# Patient Record
Sex: FEMALE | Race: WHITE | NOT HISPANIC OR LATINO | Employment: OTHER | URBAN - METROPOLITAN AREA
[De-identification: names, ages, dates, MRNs, and addresses within clinical notes are randomized per-mention and may not be internally consistent; named-entity substitution may affect disease eponyms.]

---

## 2017-06-26 ENCOUNTER — HOSPITAL ENCOUNTER (OUTPATIENT)
Dept: PHYSICAL THERAPY | Facility: CLINIC | Age: 72
Setting detail: THERAPIES SERIES
End: 2017-06-26
Attending: PSYCHIATRY & NEUROLOGY
Payer: MEDICARE

## 2017-06-26 PROCEDURE — 40000189 ZZH STATISTIC PT POOL VISIT: Performed by: PHYSICAL THERAPIST

## 2017-06-26 PROCEDURE — 97110 THERAPEUTIC EXERCISES: CPT | Mod: GP | Performed by: PHYSICAL THERAPIST

## 2017-06-26 PROCEDURE — 97161 PT EVAL LOW COMPLEX 20 MIN: CPT | Mod: GP | Performed by: PHYSICAL THERAPIST

## 2017-06-26 PROCEDURE — G8979 MOBILITY GOAL STATUS: HCPCS | Mod: GP,CI | Performed by: PHYSICAL THERAPIST

## 2017-06-26 PROCEDURE — G8978 MOBILITY CURRENT STATUS: HCPCS | Mod: GP,CJ | Performed by: PHYSICAL THERAPIST

## 2017-06-26 NOTE — PROGRESS NOTES
06/26/17 0800   Quick Adds   Type of Visit Initial OP PT Evaluation   General Information   Start of Care Date 06/26/17   Referring Physician Giselle Epstein MD  ([facility])   Orders Evaluate and Treat as Indicated   Order Date 05/01/17   Medical Diagnosis Parkinsons Disease   Special Instructions (pool therapy)   Surgical/Medical history reviewed (no outside notes available in chart to review)   Pertinent history of current problem (include personal factors and/or comorbidities that impact the POC) Pt presents to PT to address symptoms associated with Parkinsons Disease and chronic mid-back pain and LBP.  She states she started Sinemet for management of PD in December 2016, which has provided improvement in daily function and balance.  She reports longstanding issues with arthritis causing mid-back pain, neck pain.  She reports her neurologist sent her here to for pool therapy to address balance training, strengthening, and postural retraining.  She reports progressive forward flexed posture over the past 6 months.  She reports first diagnosis of Parkinsons Disease in the Fall of 2016.  Pt indicates hx of C3-5 fusion due to stenosis on 2/3/17 and also has hx of R shoulder tendon repair/rotator cuff repair 11/2015.  She reports she has worked with OP PT for balance training in 01/2017 which helped, but she reports her balance has worsened since then.  Hx of falls due to balance issues, has fallen ~3 times since last November.  Had fall last November that caused increased buttock pain but no fracture.  Onset of back pain with prolonged standing/walking >10 min, typically worse in afternoons.  No radicular pain reported.   Prior level of function comment Indep PLOF   Previous/Current Treatment Physical Therapy;Medication(s)   Improvement after PT Significant  (improved balance with OP PT 01/2017)   Improvement after medication Significant  (Sinemet improving functional mobility)   Patient role/Employment  history Retired   Living environment House/townhome   Home/Community Accessibility Comments single level townhome, no stairs   Current Assistive Devices (occasionally using 4WW, not typically)   Assistive Devices Comments pt owns a FWW and 4WW, but does not typically use it.  Has typically only used it after her falls due to increased back and hip soreness.   Patient/Family Goals Statement Pt hopes to improve her posture, stand straight   General Information Comments pt does not report much interest working with land-based PT despite hx of balance issues   Fall Risk Screen   Fall screen completed by PT   Per patient - Fall 2 or more times in past year? Yes   Per patient - Fall with injury in past year? Yes  (contusions, no fractures)   Timed Up and Go score (seconds) 9.31   Is patient a fall risk? No  (although hx of falls)   Fall screen comments pt reports ~3 falls since November, reports she is tripping over her feet.  She reports her falls cause pain into her hips and back, uses a walker after her falls typically.     Pain   Patient currently in pain Yes   Pain location (mid and low-back)   Pain rating 1   Pain description Sharp;Dull   Pain comments pt reports increased mid- and low-back pain throughout the day and when she stands/walks >10 min, states she is limited to ~1 block distances due to pain and fatigue.  She reports pain 5/10 at times with activity and prolonged standing.   Cognitive Status Examination   Orientation orientation to person, place and time   Integumentary   Integumentary No deficits were identified   Posture   Posture Forward head position;Protracted shoulders;Kyphosis;Other  (scoliosis)   Posture Comments Mild/moderate forward head positioning and kyphotic upper quarter posture.  Pt reports hx of scoliosis.   Palpation   Palpation Mild TTP about mid-lumbar segments, minimal/no TTP throughout paraspinal mm and buttocks, but palpable mm tone noted in back.   Range of Motion (ROM)   ROM  Comment Limited trunk and spinal extension as well as rotation and sidebending.  Seated R cervical rotation 45 deg, L rotation 30 deg.  Tightness about R hip compared to L, lacking hip extension on R past neutral in sidelying.   Strength   Strength Comments Generalized weakness/deconditioning proximally at hips.  Functional weakness related to kyphotic posture and forward flexed upper quarter.  5/5 jack ankle PF/DF and knee extension.  Jack hip flexion 4-/5, R hip abduction 3-/5, L hip abduction 3+/5.   Bed Mobility   Bed Mobility Comments Indep, limited trunk rotation and mobility   Transfer Skills   Transfer Comments Indep, steady on feet, forward flexed posture.  Able to perform without hands.   Gait   Gait Comments Indep, steady gait without AD.  Limited trunk rotation and forward flexed posture assumed.  Mild limitation in jack foot clearance with gait cycle.  Good ability to change speeds and navigate around obstacles.   Gait Special Tests   Gait Special Tests 25 FOOT TIMED WALK;DYNAMIC GAIT INDEX   Gait Special Tests 25 Foot Timed Walk   Seconds 6.3   Comments no AD   Gait Special Tests Dynamic Gait Index   Score out of 24 20   Balance   Balance Comments Good functional gait stability, but impaired SLS and funtional tasks such as stepping up/over box or curb.   Balance Special Tests   Balance Special Tests Single leg stance right;Single leg stance left;Timed up and go;Modified CTSIB Conditions   Balance Special Tests Timed Up and Go   Comments 9.31 sec, no AD   Balance Special Tests Single Leg Stance Right,   Comments 1   Balance Special Tests Single Leg Stance Left   Comments 1   Balance Special Tests Modified CTSIB Conditions   Condition 1, seconds 30 Seconds   Condition 2, seconds 30 Seconds   Condition 4, seconds 30 Seconds   Condition 5, seconds 30 Seconds   Sensory Examination   Sensory Perception Comments mild numbness in toes, specifically R great toe   Coordination   Coordination Comments no observable  tremors noted related to PD   Planned Therapy Interventions   Planned Therapy Interventions balance training;gait training;neuromuscular re-education;joint mobilization;ROM;strengthening;stretching;transfer training;manual therapy;other (see comments)  (pool therapy)   Planned Therapy Interventions Comment consider transition to land-based PT interventions as appropriate   Clinical Impression   Criteria for Skilled Therapeutic Interventions Met yes, treatment indicated   PT Diagnosis Back pain and impaired posture, impaired balance   Influenced by the following impairments Back pain, forward flexed posture, limited spinal ROM, impaired dynamic balance, decreased hip ROM, weakness in chong LEs and trunk/core   Functional limitations due to impairments Impaired dynamic gait stability, difficulty with functional tasks requiring SLS, limited standing/walking tolerance to ~1 block distances due to back pain and fatigue   Clinical Presentation Stable/Uncomplicated   Clinical Presentation Rationale stable medical status, following with neuro team regularly, functional status currently   Clinical Decision Making (Complexity) Low complexity   Therapy Frequency 2 times/Week   Predicted Duration of Therapy Intervention (days/wks) 4  (anticipated prolonged POC due to scheduling)   Risk & Benefits of therapy have been explained Yes   Patient, Family & other staff in agreement with plan of care Yes   Education Assessment   Barriers to Learning No barriers   Goal 1   Goal Identifier SLS   Goal Description Pablo will demonstrate ability to perform SLS > 5 sec chong for improved stability with lower body dressing and stepping into tubs, up/down curbs, and navigating stairs.   Target Date 08/11/17   Goal 2   Goal Identifier Pain   Goal Description Pablo will report 0-1/10 pain about her back x 1 week for improved tolerance with daily activities and improved QOL.   Target Date 08/11/17   Goal 3   Goal Identifier Activity   Goal  Description Pablo will report/demonstrate ability to tolerate standing exercise and/or walking program for >20 min consecutively with pain no greater than 1/10 about her back for improved tolerance with community ambulation distances and return to active lifestyle.   Target Date 08/11/17   Goal 4   Goal Identifier HEP   Goal Description Pablo will demonstrate I with HEP for long-term management of back pain and postural retraining for improved QOL and tolerance with daily activities.   Target Date 08/11/17   Total Evaluation Time   Total Evaluation Time (Minutes) 38   Therapy Certification   Certification date from 06/26/17   Certification date to 08/11/17   Medical Diagnosis Parkinsons Disease

## 2017-06-26 NOTE — PROGRESS NOTES
"                                                                                              [facility]        OUTPATIENT PHYSICAL THERAPY FUNCTIONAL EVALUATION  PLAN OF TREATMENT FOR OUTPATIENT REHABILITATION  (COMPLETE FOR INITIAL CLAIMS ONLY)  Patient's Last Name, First Name, M.I.  YOB: 1945  Pablo Baez  AS     Provider's Name   [facility]   Medical Record No.  [id number]     Start of Care Date:  06/26/17   Onset Date:   10/1/2016 (pt unable to state when pain started, \"Fall of 2016\")   Type:     _X__PT   ____OT  ____SLP Medical Diagnosis:  Parkinsons Disease     PT Diagnosis:  Back pain and impaired posture, impaired balance Visits from SOC:  1                              __________________________________________________________________________________  Plan of Treatment/Functional Goals:  balance training, gait training, neuromuscular re-education, joint mobilization, ROM, strengthening, stretching, transfer training, manual therapy, other (see comments) (pool therapy)  consider transition to land-based PT interventions as appropriate        GOALS  SLS  Pablo will demonstrate ability to perform SLS > 5 sec chong for improved stability with lower body dressing and stepping into tubs, up/down curbs, and navigating stairs.  08/11/17    Pain  Pablo will report 0-1/10 pain about her back x 1 week for improved tolerance with daily activities and improved QOL.  08/11/17    Activity  Pablo will report/demonstrate ability to tolerate standing exercise and/or walking program for >20 min consecutively with pain no greater than 1/10 about her back for improved tolerance with community ambulation distances and return to active lifestyle.  08/11/17    HEP  Pablo will demonstrate I with HEP for long-term management of back pain and postural retraining for improved QOL and tolerance with daily activities.  08/11/17       Therapy Frequency:  2 times/Week "   Predicted Duration of Therapy Intervention:  4 wks (anticipated prolonged POC due to scheduling)    Jason Miller PT                                    I CERTIFY THE NEED FOR THESE SERVICES FURNISHED UNDER        THIS PLAN OF TREATMENT AND WHILE UNDER MY CARE .             Physician Signature               Date    X_____________________________________________________                      Certification Date From:  06/26/17   Certification Date To:  08/11/17    Referring Provider:  Giselle Epstein MD ([facility])    Initial Assessment  See Epic Evaluation- Start of Care Date: 06/26/17

## 2017-06-29 ENCOUNTER — HOSPITAL ENCOUNTER (OUTPATIENT)
Dept: PHYSICAL THERAPY | Facility: CLINIC | Age: 72
Setting detail: THERAPIES SERIES
End: 2017-06-29
Attending: PSYCHIATRY & NEUROLOGY
Payer: MEDICARE

## 2017-06-29 PROCEDURE — 97113 AQUATIC THERAPY/EXERCISES: CPT | Mod: GP | Performed by: PHYSICAL THERAPIST

## 2017-06-29 PROCEDURE — 40000189 ZZH STATISTIC PT POOL VISIT: Performed by: PHYSICAL THERAPIST

## 2017-06-29 NOTE — PROGRESS NOTES
AQUATIC PHYSICAL THERAPY EXERCISE LOG (TRUNK)  Date  Senait Chino, DPT     Warm Up Ambulation (Forward/Side/Back/March/All) X 2 With therapist ARLIN CHAMORRO             Stretching/ROM Chin Tucks     CROM (Flex/Ext/SB/Rot/All)     Shoulder (Shrugs/Rolls)     Scapular (Retraction/Depression)     Upper Trunk (Levator/Scalene/Upper Trapezius)     Pec Stretch (Unilateral/Bilateral)     Posterior Shoulder     Gluteal 30 sec x 2    Hamstring (On Step/Cuff) 30 sec x2    Calf (In Hole/At Wall)     Quad     Hip Flexor (Kneel)     Piriformis (Seated/Stand)     Trunk ROM (Flex/Ext/SB/Rot/All) Extension stretch in standing manual cues 10 sec x 2    BAD RAGAZ              Strengthening Abdominal Sets/ Pelvic Tilt With exercises    Shoulder (Flex/Ext, Abd/Add, IR/ER, Circles, Alternation flex/ext for core) With open paddles away from wall x 10, cues for decreased speed as needed for improved stability    Horizon (Abd/Add, Diagonals) X 10 as above    Rowing Arms     UE PNF (D1/D2)     Abdominal Sets (Push/Pull, side to side, push down with board) X 10 with small board    Squat     Lunge Squat     Hip (Flex/Ext, Abd/Add, single leg bike, circles, figure 8, All) X 10 with wall support    Heel/Toe Raises     Step Ups (Forward, Lateral)     Noodle Push Downs with UE(B UE/1 UE) for core strengthening X 10 in wall sit positioning, colored noodle, verbal and manual cues for shoulder alignment    Noodle Push Downs with LE X 10 in corner,needing B UE support and therapist assist with noodle    Stir the Pot/Punches with Dumbells     Sit to Stand at Bench     Prone Plank on step     Side Plank on step              Aerobic Fast Walking     Bike/Ski/Jack/All              Balance Narrow Base of Support     Tandem Stand     Single Leg Stance     Heel/Toe Walking     3 Step and Stop     Braiding                   Cool Down Ambulation     [place] Dangle     [place]

## 2017-07-05 NOTE — ADDENDUM NOTE
Encounter addended by: Jason Miller, PT on: 7/5/2017 10:38 AM<BR>     Actions taken: Flowsheet accepted, Sign clinical note

## 2017-07-06 ENCOUNTER — HOSPITAL ENCOUNTER (OUTPATIENT)
Dept: PHYSICAL THERAPY | Facility: CLINIC | Age: 72
Setting detail: THERAPIES SERIES
End: 2017-07-06
Attending: PSYCHIATRY & NEUROLOGY
Payer: MEDICARE

## 2017-07-06 PROCEDURE — 40000189 ZZH STATISTIC PT POOL VISIT: Performed by: PHYSICAL THERAPIST

## 2017-07-06 PROCEDURE — 97113 AQUATIC THERAPY/EXERCISES: CPT | Mod: GP | Performed by: PHYSICAL THERAPIST

## 2017-07-06 NOTE — PROGRESS NOTES
AQUATIC PHYSICAL THERAPY EXERCISE LOG (TRUNK)  Date   Senait Herrmannlynn, DPT Kaylin Valdez, PT   7/6/17   Warm Up Ambulation (Forward/Side/Back/March/All) X 2 With therapist ARLIN B x2 with SBA, cues for arm swing and increased step length                     Stretching/ROM Chin Tucks        CROM (Flex/Ext/SB/Rot/All)        Shoulder (Shrugs/Rolls)        Scapular (Retraction/Depression)        Upper Trunk (Levator/Scalene/Upper Trapezius)        Pec Stretch (Unilateral/Bilateral)        Posterior Shoulder        Gluteal 30 sec x 2 30 sec x2 B     Hamstring (On Step/Cuff) 30 sec x2 30 sec x2 B     Calf (In Hole/At Wall)        Quad   x1 min B with yellow noodle     Hip Flexor (Kneel)        Piriformis (Seated/Stand)        Trunk ROM (Flex/Ext/SB/Rot/All) Extension stretch in standing manual cues 10 sec x 2 Ext stretch in standing manual cues x15 sec x3     BAD RAGAZ                        Strengthening Abdominal Sets/ Pelvic Tilt With exercises Cued throughout with ex     Shoulder (Flex/Ext, Abd/Add, IR/ER, Circles, Alternation flex/ext for core) With open paddles away from wall x 10, cues for decreased speed as needed for improved stability With open paddles away from wall x10; cues for techniuqe     Horizon (Abd/Add, Diagonals) X 10 as above x10 as above     Rowing Arms        UE PNF (D1/D2)        Abdominal Sets (Push/Pull, side to side, push down with board) X 10 with small board      Squat        Lunge Squat        Hip (Flex/Ext, Abd/Add, single leg bike, circles, figure 8, All) X 10 with wall support x10 with wall support, cues needed throughout for trunk extension     Heel/Toe Raises        Step Ups (Forward, Lateral)        Noodle Push Downs with UE(B UE/1 UE) for core strengthening X 10 in wall sit positioning, colored noodle, verbal and manual cues for shoulder alignment      Noodle Push Downs with LE X 10 in corner,needing B UE support and therapist assist with noodle x10 with yellow noodle; wall support needed  and therapist assist for noodle placement     Stir the Pot/Punches with Dumbells        Sit to Stand at Bench        Prone Plank on step        Side Plank on step                        Aerobic Fast Walking        Bike/Ski/Stone/All                        Balance Narrow Base of Support        Tandem Stand        Single Leg Stance        Heel/Toe Walking        3 Step and Stop        Braiding                                 Cool Down Ambulation   x2 forward with emphasis on posture and step length     [place] Dangle        [place]

## 2017-07-12 ENCOUNTER — HOSPITAL ENCOUNTER (OUTPATIENT)
Dept: PHYSICAL THERAPY | Facility: CLINIC | Age: 72
Setting detail: THERAPIES SERIES
End: 2017-07-12
Attending: PSYCHIATRY & NEUROLOGY
Payer: MEDICARE

## 2017-07-12 PROCEDURE — 97113 AQUATIC THERAPY/EXERCISES: CPT | Mod: GP | Performed by: PHYSICAL THERAPIST

## 2017-07-12 PROCEDURE — 97112 NEUROMUSCULAR REEDUCATION: CPT | Mod: GP | Performed by: PHYSICAL THERAPIST

## 2017-07-12 PROCEDURE — 40000189 ZZH STATISTIC PT POOL VISIT: Performed by: PHYSICAL THERAPIST

## 2017-07-20 ENCOUNTER — HOSPITAL ENCOUNTER (OUTPATIENT)
Dept: PHYSICAL THERAPY | Facility: CLINIC | Age: 72
Setting detail: THERAPIES SERIES
End: 2017-07-20
Attending: PSYCHIATRY & NEUROLOGY
Payer: MEDICARE

## 2017-07-20 PROCEDURE — 40000189 ZZH STATISTIC PT POOL VISIT: Performed by: PHYSICAL THERAPIST

## 2017-07-20 PROCEDURE — 97113 AQUATIC THERAPY/EXERCISES: CPT | Mod: GP | Performed by: PHYSICAL THERAPIST

## 2017-07-20 NOTE — PROGRESS NOTES
AQUATIC PHYSICAL THERAPY EXERCISE LOG (TRUNK)  Date    Senait Herrmannlynn, DPT Kaylin Valdez, PT   7/6/17 Senait Magana, 7/20/17   Warm Up Ambulation (Forward/Side/Back/March/All) X 2 With therapist HHA B x2 with SBA, cues for arm swing and increased step length X 2 With manual cues for posture                               Stretching/ROM Chin Tucks            CROM (Flex/Ext/SB/Rot/All)            Shoulder (Shrugs/Rolls)            Scapular (Retraction/Depression)            Upper Trunk (Levator/Scalene/Upper Trapezius)            Pec Stretch (Unilateral/Bilateral)            Posterior Shoulder            Gluteal 30 sec x 2 30 sec x2 B 30 sec x 2      Hamstring (On Step/Cuff) 30 sec x2 30 sec x2 B 30 sec x 2      Calf (In Hole/At Wall)            Quad    x1 min B with yellow noodle      Hip Flexor (Kneel)            Piriformis (Seated/Stand)            Trunk ROM (Flex/Ext/SB/Rot/All) Extension stretch in standing manual cues 10 sec x 2 Ext stretch in standing manual cues x15 sec x3      BAD RAGAZ                                     Strengthening Abdominal Sets/ Pelvic Tilt With exercises Cued throughout with ex Throughout exercises     Shoulder (Flex/Ext, Abd/Add, IR/ER, Circles, Alternation flex/ext for core) With open paddles away from wall x 10, cues for decreased speed as needed for improved stability With open paddles away from wall x10; cues for techniuqe      Horizon (Abd/Add, Diagonals) X 10 as above x10 as above       Rowing Arms            UE PNF (D1/D2)            Abdominal Sets (Push/Pull, side to side, push down with board) X 10 with small board         Squat      X 10 with glute taps      Lunge Squat            Hip (Flex/Ext, Abd/Add, single leg bike, circles, figure 8, All) X 10 with wall support x10 with wall support, cues needed throughout for trunk extension X 10 with wall support     Heel/Toe Raises            Step Ups (Forward, Lateral)            Noodle Push Downs with UE(B UE/1 UE) for core  strengthening X 10 in wall sit positioning, colored noodle, verbal and manual cues for shoulder alignment         Noodle Push Downs with LE X 10 in corner,needing B UE support and therapist assist with noodle x10 with yellow noodle; wall support needed and therapist assist for noodle placement X 10 with therapist support     Stir the Pot/Punches with Dumbells            Sit to Stand at Bench      X 10 with min A for 5, 5 with SBA        Prone Plank on step            Side Plank on step                                     Aerobic Fast Walking            Bike/Ski/Stone/All                                     Balance Narrow Base of Support            Tandem Stand            Single Leg Stance            Heel/Toe Walking            3 Step and Stop      X 2 forwards, x 2 backwards, needing min A for balance intermittantly      Braiding                    Yoga poses and transitions holds for 5-10 secs, then transitions between pose: mountain pose with x 3 lumbar extensions, chair pose, warrior 1, warrior 2. All with hands on assist initially for pose then holds. Pt cued for transitions, needing manual assist              Marching x 2 laps with cues for big movement, cues for increased B arm swing              reciprical arm movement with big steps   Cool Down Ambulation    x2 forward with emphasis on posture and step length      [place] Dangle            [place]

## 2017-07-26 ENCOUNTER — HOSPITAL ENCOUNTER (OUTPATIENT)
Dept: PHYSICAL THERAPY | Facility: CLINIC | Age: 72
Setting detail: THERAPIES SERIES
End: 2017-07-26
Attending: PSYCHIATRY & NEUROLOGY
Payer: MEDICARE

## 2017-07-26 PROCEDURE — 40000189 ZZH STATISTIC PT POOL VISIT: Performed by: PHYSICAL THERAPIST

## 2017-07-26 PROCEDURE — 97113 AQUATIC THERAPY/EXERCISES: CPT | Mod: GP | Performed by: PHYSICAL THERAPIST

## 2017-07-26 PROCEDURE — 97112 NEUROMUSCULAR REEDUCATION: CPT | Mod: GP | Performed by: PHYSICAL THERAPIST

## 2017-07-26 NOTE — PROGRESS NOTES
AQUATIC PHYSICAL THERAPY EXERCISE LOG (TRUNK)  Date    Senait Magana, DPT Kaylin Valdez, PT   7/6/17 Senait Magana, 7/20/17 Christine Pack PT, DPT  7/26/17   Warm Up Ambulation (Forward/Side/Back/March/All) X 2 With therapist ARLIN B x2 with SBA, cues for arm swing and increased step length X 2 With manual cues for posture X 2 With manual cues for posture and inc step length, inc arm swing                                     Stretching/ROM Chin Tucks               CROM (Flex/Ext/SB/Rot/All)               Shoulder (Shrugs/Rolls)               Scapular (Retraction/Depression)               Upper Trunk (Levator/Scalene/Upper Trapezius)               Pec Stretch (Unilateral/Bilateral)               Posterior Shoulder               Gluteal 30 sec x 2 30 sec x2 B 30 sec x 2       Hamstring (On Step/Cuff) 30 sec x2 30 sec x2 B 30 sec x 2 30 sec x 2 noodle support under ankle      Calf (In Hole/At Wall)         x30 sec ea with manual overpressure to quad      Quad    x1 min B with yellow noodle   x1 min B with yellow noodle      Hip Flexor (Kneel)               Piriformis (Seated/Stand)               Trunk ROM (Flex/Ext/SB/Rot/All) Extension stretch in standing manual cues 10 sec x 2 Ext stretch in standing manual cues x15 sec x3   Ext stretch in standing manual cues x15 sec x3      BAD RAGAZ                                              Strengthening Abdominal Sets/ Pelvic Tilt With exercises Cued throughout with ex Throughout exercises Throughout exercises     Shoulder (Flex/Ext, Abd/Add, IR/ER, Circles, Alternation flex/ext for core) With open paddles away from wall x 10, cues for decreased speed as needed for improved stability With open paddles away from wall x10; cues for techniuqe         Horizon (Abd/Add, Diagonals) X 10 as above x10 as above         Rowing Arms         Demo on land for carryover of HEP      UE PNF (D1/D2)               Abdominal Sets (Push/Pull, side to side, push down with board) X 10 with small board             Squat       X 10 with glute taps x10 glut taps to wall: cues for B UE forward reach and trunk lean for anterior wt shift > quick transition to stand with UEs extended to sides       Lunge Squat         Large forward step ea LE with UE extended to sides x10 ea      Hip (Flex/Ext, Abd/Add, single leg bike, circles, figure 8, All) X 10 with wall support x10 with wall support, cues needed throughout for trunk extension X 10 with wall support       Heel/Toe Raises               Step Ups (Forward, Lateral)               Noodle Push Downs with UE(B UE/1 UE) for core strengthening X 10 in wall sit positioning, colored noodle, verbal and manual cues for shoulder alignment            Noodle Push Downs with LE X 10 in corner,needing B UE support and therapist assist with noodle x10 with yellow noodle; wall support needed and therapist assist for noodle placement X 10 with therapist support      Stir the Pot/Punches with Dumbells               Sit to Stand at Bench       X 10 with min A for 5, 5 with SBA       Prone Plank on step               Side Plank on step                          Posture exercise: x10 trunk support against pool wall, heels against wall B forward reach > overhead > extended to sides shoulder height x10 sec holds                    Aerobic Fast Walking               Bike/Ski/Stone/All                                              Balance Narrow Base of Support               Tandem Stand               Single Leg Stance               Heel/Toe Walking               3 Step and Stop       X 2 forwards, x 2 backwards, needing min A for balance intermittantly       Braiding                        Yoga poses and transitions holds for 5-10 secs, then transitions between pose: mountain pose with x 3 lumbar extensions, chair pose, warrior 1, warrior 2. All with hands on assist initially for pose then holds. Pt cued for transitions, needing manual assist reinforcing high amplitude/power movements side stepping  with LE ER, trunk rotation, same sided B UE extended to sides  warrior pose  x5 sec holds               Marching x 2 laps with cues for big movement, cues for increased B arm swing                reciprical arm movement with big steps    Cool Down Ambulation    x2 forward with emphasis on posture and step length         [place] Dangle               [place]

## 2017-08-07 ENCOUNTER — HOSPITAL ENCOUNTER (OUTPATIENT)
Dept: PHYSICAL THERAPY | Facility: CLINIC | Age: 72
Setting detail: THERAPIES SERIES
End: 2017-08-07
Attending: PSYCHIATRY & NEUROLOGY
Payer: MEDICARE

## 2017-08-07 PROCEDURE — 40000189 ZZH STATISTIC PT POOL VISIT: Performed by: PHYSICAL THERAPIST

## 2017-08-07 PROCEDURE — 97113 AQUATIC THERAPY/EXERCISES: CPT | Mod: GP | Performed by: PHYSICAL THERAPIST

## 2017-08-07 NOTE — PROGRESS NOTES
AQUATIC PHYSICAL THERAPY EXERCISE LOG (TRUNK)  Date    Senait Magana, DPT Kaylin Valdez, PT   7/6/17 Senait Montezlynn, 7/20/17 Christine Pack PT, DPT  7/26/17 Senait Herrmannlynn, DPT 8/7/17   Warm Up Ambulation (Forward/Side/Back/March/All) X 2 With therapist ARLIN B x2 with SBA, cues for arm swing and increased step length X 2 With manual cues for posture X 2 With manual cues for posture and inc step length, inc arm swing X 2 each, cues for arm swing                                           Stretching/ROM Chin Tucks                  CROM (Flex/Ext/SB/Rot/All)                  Shoulder (Shrugs/Rolls)                  Scapular (Retraction/Depression)                  Upper Trunk (Levator/Scalene/Upper Trapezius)                  Pec Stretch (Unilateral/Bilateral)                  Posterior Shoulder                  Gluteal 30 sec x 2 30 sec x2 B 30 sec x 2         Hamstring (On Step/Cuff) 30 sec x2 30 sec x2 B 30 sec x 2 30 sec x 2 noodle support under ankle 30 sec x 2      Calf (In Hole/At Wall)          x30 sec ea with manual overpressure to quad 30 sec x 2      Quad    x1 min B with yellow noodle    x1 min B with yellow noodle       Hip Flexor (Kneel)                  Piriformis (Seated/Stand)                  Trunk ROM (Flex/Ext/SB/Rot/All) Extension stretch in standing manual cues 10 sec x 2 Ext stretch in standing manual cues x15 sec x3    Ext stretch in standing manual cues x15 sec x3 In standing x 5 reps, no hold extension      BAD RAGAZ                                                       Strengthening Abdominal Sets/ Pelvic Tilt With exercises Cued throughout with ex Throughout exercises Throughout exercises With exercises      Shoulder (Flex/Ext, Abd/Add, IR/ER, Circles, Alternation flex/ext for core) With open paddles away from wall x 10, cues for decreased speed as needed for improved stability With open paddles away from wall x10; cues for techniuqe      With closed paddles away from wall x 10      Horizon (Abd/Add,  Diagonals) X 10 as above x10 as above      As above x 10      Rowing Arms          Demo on land for carryover of HEP       UE PNF (D1/D2)                  Abdominal Sets (Push/Pull, side to side, push down with board) X 10 with small board         With small board cues for UEs      Squat       X 10 with glute taps x10 glut taps to wall: cues for B UE forward reach and trunk lean for anterior wt shift > quick transition to stand with UEs extended to sides  X 10 glute taps      Lunge Squat          Large forward step ea LE with UE extended to sides x10 ea       Hip (Flex/Ext, Abd/Add, single leg bike, circles, figure 8, All) X 10 with wall support x10 with wall support, cues needed throughout for trunk extension X 10 with wall support   X 10 with wall support      Heel/Toe Raises                  Step Ups (Forward, Lateral)                  Noodle Push Downs with UE(B UE/1 UE) for core strengthening X 10 in wall sit positioning, colored noodle, verbal and manual cues for shoulder alignment         X 10 with colored noodle      Noodle Push Downs with LE X 10 in corner,needing B UE support and therapist assist with noodle x10 with yellow noodle; wall support needed and therapist assist for noodle placement X 10 with therapist support   X 10 with colored noodle      Stir the Pot/Punches with Dumbells                  Sit to Stand at Bench       X 10 with min A for 5, 5 with SBA         Prone Plank on step                  Side Plank on step                              Posture exercise: x10 trunk support against pool wall, heels against wall B forward reach > overhead > extended to sides shoulder height x10 sec holds                        Aerobic Fast Walking                  Bike/Ski/Stone/All                                                       Balance Narrow Base of Support                  Tandem Stand                  Single Leg Stance                  Heel/Toe Walking                  3 Step and Stop       X 2  forwards, x 2 backwards, needing min A for balance intermittantly         Braiding                           Yoga poses and transitions holds for 5-10 secs, then transitions between pose: mountain pose with x 3 lumbar extensions, chair pose, warrior 1, warrior 2. All with hands on assist initially for pose then holds. Pt cued for transitions, needing manual assist reinforcing high amplitude/power movements side stepping with LE ER, trunk rotation, same sided B UE extended to sides  warrior pose  x5 sec holds Yoga poses and transitions holds for 5-10 secs, then transitions between pose: mountain pose with x 3 lumbar extensions, chair pose, warrior 1, warrior 2. All with hands on assist initially for pose then holds. Pt cued for transitions, needing manual assist               Marching x 2 laps with cues for big movement, cues for increased B arm swing                  reciprical arm movement with big steps      Cool Down Ambulation    x2 forward with emphasis on posture and step length            Deep Water Dangle                  [place]

## 2017-08-09 ENCOUNTER — HOSPITAL ENCOUNTER (OUTPATIENT)
Dept: PHYSICAL THERAPY | Facility: CLINIC | Age: 72
Setting detail: THERAPIES SERIES
End: 2017-08-09
Attending: PSYCHIATRY & NEUROLOGY
Payer: MEDICARE

## 2017-08-09 PROCEDURE — G8979 MOBILITY GOAL STATUS: HCPCS | Mod: GP,CI | Performed by: PHYSICAL THERAPIST

## 2017-08-09 PROCEDURE — 97113 AQUATIC THERAPY/EXERCISES: CPT | Mod: GP | Performed by: PHYSICAL THERAPIST

## 2017-08-09 PROCEDURE — G8980 MOBILITY D/C STATUS: HCPCS | Mod: GP,CJ | Performed by: PHYSICAL THERAPIST

## 2017-08-09 PROCEDURE — 40000189 ZZH STATISTIC PT POOL VISIT: Performed by: PHYSICAL THERAPIST

## 2017-08-09 PROCEDURE — 97112 NEUROMUSCULAR REEDUCATION: CPT | Mod: GP | Performed by: PHYSICAL THERAPIST

## 2017-09-03 NOTE — PROGRESS NOTES
AQUATIC PHYSICAL THERAPY EXERCISE LOG (TRUNK)  Date    Senait Montezlynn, DPT Kaylin Valdez, PT   7/6/17 Senait Herrmannlynn, 7/20/17 Christine Pack PT, DPT  7/26/17 Senait Herrmannlynn, DPT 8/7/17 Christine Pack PT, DPT  8/9/17   Warm Up Ambulation (Forward/Side/Back/March/All) X 2 With therapist HHA B x2 with SBA, cues for arm swing and increased step length X 2 With manual cues for posture X 2 With manual cues for posture and inc step length, inc arm swing X 2 each, cues for arm swing                                                  Stretching/ROM Chin Tucks                     CROM (Flex/Ext/SB/Rot/All)                     Shoulder (Shrugs/Rolls)                     Scapular (Retraction/Depression)                     Upper Trunk (Levator/Scalene/Upper Trapezius)                     Pec Stretch (Unilateral/Bilateral)                     Posterior Shoulder                     Gluteal 30 sec x 2 30 sec x2 B 30 sec x 2            Hamstring (On Step/Cuff) 30 sec x2 30 sec x2 B 30 sec x 2 30 sec x 2 noodle support under ankle 30 sec x 2       Calf (In Hole/At Wall)          x30 sec ea with manual overpressure to quad 30 sec x 2       Quad    x1 min B with yellow noodle    x1 min B with yellow noodle         Hip Flexor (Kneel)                     Piriformis (Seated/Stand)                     Trunk ROM (Flex/Ext/SB/Rot/All) Extension stretch in standing manual cues 10 sec x 2 Ext stretch in standing manual cues x15 sec x3    Ext stretch in standing manual cues x15 sec x3 In standing x 5 reps, no hold extension       BAD RAGAZ                                                                Strengthening Abdominal Sets/ Pelvic Tilt With exercises Cued throughout with ex Throughout exercises Throughout exercises With exercises       Shoulder (Flex/Ext, Abd/Add, IR/ER, Circles, Alternation flex/ext for core) With open paddles away from wall x 10, cues for decreased speed as needed for improved stability With open paddles away from wall x10; cues  for techniuqe       With closed paddles away from wall x 10       Horizon (Abd/Add, Diagonals) X 10 as above x10 as above       As above x 10       Rowing Arms          Demo on land for carryover of HEP         UE PNF (D1/D2)                     Abdominal Sets (Push/Pull, side to side, push down with board) X 10 with small board          With small board cues for UEs       Squat       X 10 with glute taps x10 glut taps to wall: cues for B UE forward reach and trunk lean for anterior wt shift > quick transition to stand with UEs extended to sides  X 10 glute taps       Lunge Squat          Large forward step ea LE with UE extended to sides x10 ea         Hip (Flex/Ext, Abd/Add, single leg bike, circles, figure 8, All) X 10 with wall support x10 with wall support, cues needed throughout for trunk extension X 10 with wall support    X 10 with wall support       Heel/Toe Raises                     Step Ups (Forward, Lateral)                     Noodle Push Downs with UE(B UE/1 UE) for core strengthening X 10 in wall sit positioning, colored noodle, verbal and manual cues for shoulder alignment          X 10 with colored noodle       Noodle Push Downs with LE X 10 in corner,needing B UE support and therapist assist with noodle x10 with yellow noodle; wall support needed and therapist assist for noodle placement X 10 with therapist support    X 10 with colored noodle       Stir the Pot/Punches with Dumbells                     Sit to Stand at Bench       X 10 with min A for 5, 5 with SBA            Prone Plank on step                     Side Plank on step                                 Posture exercise: x10 trunk support against pool wall, heels against wall B forward reach > overhead > extended to sides shoulder height x10 sec holds                             Aerobic Fast Walking                     Bike/Ski/Stone/All                                                                Balance Narrow Base of Support                      Tandem Stand                     Single Leg Stance                     Heel/Toe Walking                     3 Step and Stop       X 2 forwards, x 2 backwards, needing min A for balance intermittantly            Braiding                              Yoga poses and transitions holds for 5-10 secs, then transitions between pose: mountain pose with x 3 lumbar extensions, chair pose, warrior 1, warrior 2. All with hands on assist initially for pose then holds. Pt cued for transitions, needing manual assist reinforcing high amplitude/power movements side stepping with LE ER, trunk rotation, same sided B UE extended to sides  warrior pose  x5 sec holds Yoga poses and transitions holds for 5-10 secs, then transitions between pose: mountain pose with x 3 lumbar extensions, chair pose, warrior 1, warrior 2. All with hands on assist initially for pose then holds. Pt cued for transitions, needing manual assist Yoga poses and transitions holds for 5-10 secs, then transitions between pose: mountain pose with x 3 lumbar extensions, chair pose, warrior 1, warrior 2. All with hands on assist initially for pose then holds. Pt requires modeling cues for technique               Marching x 2 laps with cues for big movement, cues for increased B arm swing                     reciprical arm movement with big steps         Cool Down Ambulation    x2 forward with emphasis on posture and step length               Deep Water Dangle                     [place]

## 2017-09-03 NOTE — ADDENDUM NOTE
Encounter addended by: Christine Pack, PT on: 9/3/2017  4:28 PM<BR>     Actions taken: Sign clinical note, Flowsheet accepted, Episode resolved

## 2017-09-03 NOTE — PROGRESS NOTES
08/09/17 1000   Signing Clinician's Name / Credentials   Signing clinician's name / credentials Christine Pack PT, DPT   Session Number   Session Number DISCHARGE NOTE: Episode of care 6/26/17-8/9/17. Participated in 8 visits total including evaluation. Treatment focus: aquatic therapy to address back pain and impaired posture, impaired balance    Progress Note/Recertification   Recertification Due Date 08/12/17   PT Medicare Only G-code   G-code Mobility: Walking & Moving Around   Mobility: Walking & Moving Around   Mobility Goal,  (eval/re-eval, every progress note, & discharge) CI: 1-19% impairment   Mobility Discharge Status,  (discharge) CJ: 20-39% impairment   Discharge Mobility Modifier Rationale Pain levels, clinical judgement   Mobility Comments no significant change since eval with short-lived relief to back pain and improved posture following therapy sessions; DGI was not considered for d/c mobility rational - unable to reassess this measure in the pool environment   Adult Goals   PT Eval Goals 1;2;3;4   Goal 1   Goal Identifier SLS (baseline: R/L LE 1 second ea)   Goal Description Pablo will demonstrate ability to perform SLS > 5 sec chong for improved stability with lower body dressing and stepping into tubs, up/down curbs, and navigating stairs.   Target Date 08/11/17   Date Met (8/9/17: progress made, goal nearly met. 4 sec ea LE)   Goal 2   Goal Identifier Pain   Goal Description Pablo will report 0-1/10 pain about her back x 1 week for improved tolerance with daily activities and improved QOL.   Target Date 08/11/17   Date Met (8/7: no significant change from baseline; 6-7/10 walking )   Goal 3   Goal Identifier Activity (baseline: limited to ~1 block distances due to pain and fatigue)   Goal Description Pablo will report/demonstrate ability to tolerate standing exercise and/or walking program for >20 min consecutively with pain no greater than 1/10 about her back for improved  "tolerance with community ambulation distances and return to active lifestyle.   Target Date 08/11/17   Date Met (8/9:reports can walk about 1/5 block, then needing to rest)   Goal 4   Goal Identifier HEP   Goal Description Pablo will demonstrate I with HEP for long-term management of back pain and postural retraining for improved QOL and tolerance with daily activities.   Target Date 08/11/17   Date Met (8/9: requires min cues from therapist reviewing aquatic exs)   Subjective Report   Subjective Report Pt reports fall walking on even ground was  propelled forward  and landed on R UE with bruising without further injury. Reviewed all exercises last session, pt reports feeling like this was her best session so far. Feels good in the pool, feels like she can stand tall without pain. Continues to feel posture is limited outside of pool, however after last session feels like things have been better for the past 2 days, standing straighter. She continues to be looking into gym membership options, she would rather a 1x fee to return to pool 1x/week on her own for continued exercise vs gym membership; reports [facility] has this option. Not particularly eager to pursue this because overall pool therapy hasn t helped posture as much when out of pool, wants to \"play it by ear and see.\" Feeling standing straighter in the morning when sleeping flat on back. Sleeps in recliner. When walking feels continued mid-back pain and fatigue without significant change throughout course of care. Clarifies  pain  described as  locked in position   stiff.  Worst: 6-7/10 when she walks, and 0/10 in sitting. Takes Tylenol 1000 mg 3x/day to manage but doesn't keep the pain away. She is walking around her house frequently active throughout the day. Wants more information on prior discussions around LSVT BIG.   Neuromuscular Re-education   Minutes 35   Skilled Intervention re-ed on using AD as tool for improved stability and " ability to maintain upright posture. Repeated ed on LSVT BIG program frequency, duration. Benefit of participation in LSVT BIG to progress ability to sustain bigger, more normal amplitude with all exercises and functional tasks and mobility. Recommended f/u with neurologist for requesting order for PT to pursue LSVT BIG   Patient Response Owns, but does not prefer to use walker for improved posture, feels like she leans over more, and does not own SPC. repeated questions for therapist during this and prior sessions for clarifying LSVT BIG, questions about this as continued therapy, frequency, duration and scheduling. Requires written instruction for carryover of education provided. Continues to be interested throughout conversation but uncertain about the commitment required.    Treatment Detail LSVT BIG education. Reviewed previously provided land based exercises, reinforced technique and consistancy   Progress Continued increased risk for falls. Appropraite to pursue large amplitude movements to address continued limitations in posture and postural/gait instability. Recommending pt f/u with neurologist to pursue any continued interest.   Aquatic Therapy   Minutes 20   Skilled Intervention Review of goals with feedback on baseline and continued limitations. Cues for balance exs with reference to written and picture instruction, feedback on appropraite frequency of exercise for continued self progress towards posture and endurance goal   Patient Response pt denies any questions around exercises and continued (I) aquatic exercise following therapy session. Demos continued need for min cues from therapist with referance to written and picture instruction on previously reviewed exercises   Treatment Detail see flowsheet. SLS reassessment. Review of goals.   Progress Limited progress towards all goals. Limited insight and motivation around need for inc frequency and duration of exercise for improved progress.Demos mild  cognitive limitations contributing to continued need for cues with reviewed HEP   Education   Learner Patient   Readiness Acceptance   Method Demonstration;Explanation;Booklet/handout   Response Verbalizes Understanding;Demonstrates Understanding;Needs Reinforcement   Education Comments (above)   Plan   Homework use of AD for improved safety/upright posture   Home program aquatic exercise. land: hip flexor stretch, HS stretch, trunk extension against wall with UE reaching to sides, sit < >stand; conitnue: doorway chest stretch, supine thoracic extension with rolled towel stretch, seated rows with YTB   Updates to plan of care Discharge with written recommendation to f/u with neurologist on orders for LSVT BIG. Pt currently feels too busy with appointments and babysitting to pursue further therapy at this time. She is looking into access to pool for continued exercise 1x/week.   Total Session Time   Timed Code Treatment Minutes 45   Total Treatment Time (sum of timed and untimed services) 45

## 2021-09-22 ENCOUNTER — APPOINTMENT (OUTPATIENT)
Dept: CT IMAGING | Facility: CLINIC | Age: 76
DRG: 185 | End: 2021-09-22
Attending: EMERGENCY MEDICINE
Payer: MEDICARE

## 2021-09-22 ENCOUNTER — HOSPITAL ENCOUNTER (INPATIENT)
Facility: CLINIC | Age: 76
LOS: 2 days | Discharge: HOME HEALTH | DRG: 185 | End: 2021-09-24
Attending: EMERGENCY MEDICINE | Admitting: INTERNAL MEDICINE
Payer: MEDICARE

## 2021-09-22 DIAGNOSIS — S01.01XA LACERATION OF SCALP, INITIAL ENCOUNTER: ICD-10-CM

## 2021-09-22 DIAGNOSIS — R29.6 MULTIPLE FALLS: ICD-10-CM

## 2021-09-22 DIAGNOSIS — S22.42XA CLOSED FRACTURE OF MULTIPLE RIBS OF LEFT SIDE, INITIAL ENCOUNTER: ICD-10-CM

## 2021-09-22 DIAGNOSIS — R41.0 CONFUSION: ICD-10-CM

## 2021-09-22 LAB
ALBUMIN UR-MCNC: NEGATIVE MG/DL
ANION GAP SERPL CALCULATED.3IONS-SCNC: 5 MMOL/L (ref 3–14)
APPEARANCE UR: CLEAR
BASOPHILS # BLD AUTO: 0 10E3/UL (ref 0–0.2)
BASOPHILS NFR BLD AUTO: 0 %
BILIRUB UR QL STRIP: NEGATIVE
BUN SERPL-MCNC: 13 MG/DL (ref 7–30)
CALCIUM SERPL-MCNC: 9.2 MG/DL (ref 8.5–10.1)
CHLORIDE BLD-SCNC: 105 MMOL/L (ref 94–109)
CO2 SERPL-SCNC: 30 MMOL/L (ref 20–32)
COLOR UR AUTO: ABNORMAL
CREAT SERPL-MCNC: 0.57 MG/DL (ref 0.52–1.04)
EOSINOPHIL # BLD AUTO: 0 10E3/UL (ref 0–0.7)
EOSINOPHIL NFR BLD AUTO: 0 %
ERYTHROCYTE [DISTWIDTH] IN BLOOD BY AUTOMATED COUNT: 12.6 % (ref 10–15)
GFR SERPL CREATININE-BSD FRML MDRD: >90 ML/MIN/1.73M2
GLUCOSE BLD-MCNC: 140 MG/DL (ref 70–99)
GLUCOSE UR STRIP-MCNC: NEGATIVE MG/DL
HCT VFR BLD AUTO: 43.6 % (ref 35–47)
HGB BLD-MCNC: 13.7 G/DL (ref 11.7–15.7)
HGB UR QL STRIP: NEGATIVE
IMM GRANULOCYTES # BLD: 0 10E3/UL
IMM GRANULOCYTES NFR BLD: 0 %
KETONES UR STRIP-MCNC: 10 MG/DL
LEUKOCYTE ESTERASE UR QL STRIP: NEGATIVE
LYMPHOCYTES # BLD AUTO: 0.8 10E3/UL (ref 0.8–5.3)
LYMPHOCYTES NFR BLD AUTO: 10 %
MCH RBC QN AUTO: 33.1 PG (ref 26.5–33)
MCHC RBC AUTO-ENTMCNC: 31.4 G/DL (ref 31.5–36.5)
MCV RBC AUTO: 105 FL (ref 78–100)
MONOCYTES # BLD AUTO: 0.5 10E3/UL (ref 0–1.3)
MONOCYTES NFR BLD AUTO: 5 %
NEUTROPHILS # BLD AUTO: 7 10E3/UL (ref 1.6–8.3)
NEUTROPHILS NFR BLD AUTO: 85 %
NITRATE UR QL: NEGATIVE
NRBC # BLD AUTO: 0 10E3/UL
NRBC BLD AUTO-RTO: 0 /100
PH UR STRIP: 6 [PH] (ref 5–7)
PLATELET # BLD AUTO: 356 10E3/UL (ref 150–450)
POTASSIUM BLD-SCNC: 3.7 MMOL/L (ref 3.4–5.3)
RBC # BLD AUTO: 4.14 10E6/UL (ref 3.8–5.2)
RBC URINE: 1 /HPF
SARS-COV-2 RNA RESP QL NAA+PROBE: NEGATIVE
SODIUM SERPL-SCNC: 140 MMOL/L (ref 133–144)
SP GR UR STRIP: 1.01 (ref 1–1.03)
SQUAMOUS EPITHELIAL: <1 /HPF
UROBILINOGEN UR STRIP-MCNC: NORMAL MG/DL
WBC # BLD AUTO: 8.3 10E3/UL (ref 4–11)
WBC URINE: 1 /HPF

## 2021-09-22 PROCEDURE — 87635 SARS-COV-2 COVID-19 AMP PRB: CPT | Performed by: EMERGENCY MEDICINE

## 2021-09-22 PROCEDURE — 120N000001 HC R&B MED SURG/OB

## 2021-09-22 PROCEDURE — 12002 RPR S/N/AX/GEN/TRNK2.6-7.5CM: CPT

## 2021-09-22 PROCEDURE — 36415 COLL VENOUS BLD VENIPUNCTURE: CPT | Performed by: EMERGENCY MEDICINE

## 2021-09-22 PROCEDURE — 81001 URINALYSIS AUTO W/SCOPE: CPT | Performed by: EMERGENCY MEDICINE

## 2021-09-22 PROCEDURE — 70450 CT HEAD/BRAIN W/O DYE: CPT | Mod: MG

## 2021-09-22 PROCEDURE — 72125 CT NECK SPINE W/O DYE: CPT | Mod: MG

## 2021-09-22 PROCEDURE — C9803 HOPD COVID-19 SPEC COLLECT: HCPCS

## 2021-09-22 PROCEDURE — 0HQ0XZZ REPAIR SCALP SKIN, EXTERNAL APPROACH: ICD-10-PCS | Performed by: EMERGENCY MEDICINE

## 2021-09-22 PROCEDURE — 99285 EMERGENCY DEPT VISIT HI MDM: CPT | Mod: 25

## 2021-09-22 PROCEDURE — 250N000013 HC RX MED GY IP 250 OP 250 PS 637: Performed by: INTERNAL MEDICINE

## 2021-09-22 PROCEDURE — 71250 CT THORAX DX C-: CPT | Mod: MG

## 2021-09-22 PROCEDURE — 85025 COMPLETE CBC W/AUTO DIFF WBC: CPT | Performed by: EMERGENCY MEDICINE

## 2021-09-22 PROCEDURE — 82374 ASSAY BLOOD CARBON DIOXIDE: CPT | Performed by: EMERGENCY MEDICINE

## 2021-09-22 PROCEDURE — 99222 1ST HOSP IP/OBS MODERATE 55: CPT | Mod: AI | Performed by: INTERNAL MEDICINE

## 2021-09-22 RX ORDER — LIDOCAINE HYDROCHLORIDE AND EPINEPHRINE 10; 10 MG/ML; UG/ML
INJECTION, SOLUTION INFILTRATION; PERINEURAL
Status: DISCONTINUED
Start: 2021-09-22 | End: 2021-09-22 | Stop reason: HOSPADM

## 2021-09-22 RX ORDER — GABAPENTIN 100 MG/1
100 CAPSULE ORAL AT BEDTIME
Status: DISCONTINUED | OUTPATIENT
Start: 2021-09-22 | End: 2021-09-24 | Stop reason: HOSPADM

## 2021-09-22 RX ORDER — KETOCONAZOLE 20 MG/ML
SHAMPOO TOPICAL
Status: ON HOLD | COMMUNITY
Start: 2021-02-21 | End: 2022-01-17

## 2021-09-22 RX ORDER — CARBIDOPA AND LEVODOPA 50; 200 MG/1; MG/1
1 TABLET, EXTENDED RELEASE ORAL AT BEDTIME
Status: DISCONTINUED | OUTPATIENT
Start: 2021-09-22 | End: 2021-09-22 | Stop reason: CLARIF

## 2021-09-22 RX ORDER — METHOCARBAMOL 500 MG/1
1 TABLET, FILM COATED ORAL 2 TIMES DAILY PRN
Status: ON HOLD | COMMUNITY
Start: 2021-07-24 | End: 2022-01-12

## 2021-09-22 RX ORDER — CARBIDOPA AND LEVODOPA 25; 100 MG/1; MG/1
1 TABLET ORAL DAILY
Status: DISCONTINUED | OUTPATIENT
Start: 2021-09-22 | End: 2021-09-22

## 2021-09-22 RX ORDER — CARBIDOPA AND LEVODOPA 25; 100 MG/1; MG/1
2 TABLET ORAL DAILY
Status: DISCONTINUED | OUTPATIENT
Start: 2021-09-23 | End: 2021-09-22

## 2021-09-22 RX ORDER — GLYCOPYRROLATE 1 MG/1
1 TABLET ORAL EVERY MORNING
Status: DISCONTINUED | OUTPATIENT
Start: 2021-09-23 | End: 2021-09-24 | Stop reason: HOSPADM

## 2021-09-22 RX ORDER — GLYCOPYRROLATE 1 MG/1
1 TABLET ORAL EVERY MORNING
Status: ON HOLD | COMMUNITY
Start: 2021-07-22 | End: 2022-11-12

## 2021-09-22 RX ORDER — CARBIDOPA AND LEVODOPA 25; 100 MG/1; MG/1
TABLET ORAL
Status: ON HOLD | COMMUNITY
Start: 2021-08-24 | End: 2022-01-12

## 2021-09-22 RX ORDER — QUETIAPINE FUMARATE 25 MG/1
25 TABLET, FILM COATED ORAL AT BEDTIME
Status: DISCONTINUED | OUTPATIENT
Start: 2021-09-22 | End: 2021-09-24 | Stop reason: HOSPADM

## 2021-09-22 RX ORDER — VITAMIN B COMPLEX
2000 TABLET ORAL DAILY
Status: DISCONTINUED | OUTPATIENT
Start: 2021-09-23 | End: 2021-09-24 | Stop reason: HOSPADM

## 2021-09-22 RX ORDER — METHOCARBAMOL 500 MG/1
500 TABLET, FILM COATED ORAL 2 TIMES DAILY PRN
Status: DISCONTINUED | OUTPATIENT
Start: 2021-09-22 | End: 2021-09-24 | Stop reason: HOSPADM

## 2021-09-22 RX ORDER — OXYCODONE HYDROCHLORIDE 5 MG/1
5 TABLET ORAL ONCE
Status: DISCONTINUED | OUTPATIENT
Start: 2021-09-22 | End: 2021-09-24 | Stop reason: CLARIF

## 2021-09-22 RX ORDER — CARBIDOPA AND LEVODOPA 25; 100 MG/1; MG/1
2 TABLET ORAL 3 TIMES DAILY
Status: DISCONTINUED | OUTPATIENT
Start: 2021-09-22 | End: 2021-09-24 | Stop reason: HOSPADM

## 2021-09-22 RX ORDER — POLYVINYL ALCOHOL 14 MG/ML
1 SOLUTION/ DROPS OPHTHALMIC EVERY 4 HOURS PRN
Status: ON HOLD | COMMUNITY
Start: 2021-07-22 | End: 2022-11-12

## 2021-09-22 RX ORDER — QUETIAPINE FUMARATE 25 MG/1
1 TABLET, FILM COATED ORAL AT BEDTIME
Status: ON HOLD | COMMUNITY
Start: 2021-07-22 | End: 2022-01-17

## 2021-09-22 RX ORDER — CARBIDOPA AND LEVODOPA 50; 200 MG/1; MG/1
TABLET, EXTENDED RELEASE ORAL
Status: ON HOLD | COMMUNITY
Start: 2021-07-22 | End: 2022-11-12

## 2021-09-22 RX ORDER — LIDOCAINE 40 MG/G
CREAM TOPICAL
Status: DISCONTINUED | OUTPATIENT
Start: 2021-09-22 | End: 2021-09-24 | Stop reason: HOSPADM

## 2021-09-22 RX ORDER — LOSARTAN POTASSIUM 25 MG/1
25 TABLET ORAL DAILY
Status: DISCONTINUED | OUTPATIENT
Start: 2021-09-23 | End: 2021-09-24 | Stop reason: HOSPADM

## 2021-09-22 RX ORDER — NEOMYCIN/POLYMYXIN B/DEXAMETHA 3.5-10K-.1
0.25 OINTMENT (GRAM) OPHTHALMIC (EYE) AT BEDTIME
Status: ON HOLD | COMMUNITY
Start: 2021-07-22 | End: 2022-11-06

## 2021-09-22 RX ORDER — GABAPENTIN 100 MG/1
100 CAPSULE ORAL AT BEDTIME
Status: ON HOLD | COMMUNITY
Start: 2021-09-15 | End: 2022-01-12

## 2021-09-22 RX ORDER — LOSARTAN POTASSIUM 25 MG/1
25 TABLET ORAL DAILY
Status: ON HOLD | COMMUNITY
Start: 2021-07-22 | End: 2022-01-17

## 2021-09-22 RX ORDER — CARBIDOPA AND LEVODOPA 25; 100 MG/1; MG/1
2 TABLET, EXTENDED RELEASE ORAL AT BEDTIME
Status: DISCONTINUED | OUTPATIENT
Start: 2021-09-22 | End: 2021-09-24 | Stop reason: HOSPADM

## 2021-09-22 RX ORDER — NITROFURANTOIN 25; 75 MG/1; MG/1
100 CAPSULE ORAL 2 TIMES DAILY
Status: ON HOLD | COMMUNITY
Start: 2021-09-20 | End: 2021-09-24

## 2021-09-22 RX ORDER — ACETAMINOPHEN 500 MG
1000 TABLET ORAL 3 TIMES DAILY
Status: ON HOLD | COMMUNITY
Start: 2021-07-22 | End: 2022-01-17

## 2021-09-22 RX ADMIN — CARBIDOPA AND LEVODOPA 2 TABLET: 25; 100 TABLET ORAL at 21:59

## 2021-09-22 RX ADMIN — GABAPENTIN 100 MG: 100 CAPSULE ORAL at 21:59

## 2021-09-22 RX ADMIN — QUETIAPINE FUMARATE 25 MG: 25 TABLET ORAL at 21:59

## 2021-09-22 ASSESSMENT — ENCOUNTER SYMPTOMS
ABDOMINAL PAIN: 0
VOMITING: 0
FEVER: 0
ARTHRALGIAS: 1
COUGH: 0
SHORTNESS OF BREATH: 0
WOUND: 1
NECK PAIN: 1
NAUSEA: 0
CHILLS: 0

## 2021-09-22 ASSESSMENT — MIFFLIN-ST. JEOR: SCORE: 961.27

## 2021-09-22 NOTE — ED TRIAGE NOTES
Pt presents to ED from [facility]. EMS notes that pt was dx with UTI yesterday. This morning, pt had a fall at 8am. Prior to arrival, pt had a second fall which resulted in a laceration to her head. Pt denies neck pr back pain. Pt believes that she tripped and fell the second time. [facility] more confused than usual today. Pt alert/oriented per EMS. .

## 2021-09-22 NOTE — ED NOTES
.[facility]  ED Nurse Handoff Report    Pablo Baez is a 76 year old female   ED Chief complaint: Fall and Head Injury  . ED Diagnosis:   Final diagnoses:   None     Allergies: Not on File    Code Status: Full Code  Activity level - Baseline/Home:  Independent. Activity Level - Current:   Assist X 1. Lift room needed: No. Bariatric: No   Needed: No   Isolation: No. Infection: Not Applicable.     Vital Signs:   Vitals:    09/22/21 1500 09/22/21 1505 09/22/21 1524 09/22/21 1743   BP:    134/58   Pulse:    71   Resp:   15 18   Temp:       TempSrc:       SpO2: 96% 95%  98%       Cardiac Rhythm:  ,      Pain level:    Patient confused: Yes. Patient Falls Risk: Yes.   Elimination Status: Has voided   Patient Report - Initial Complaint: fall. Focused Assessment: Pt presents to ED from [facility]. EMS notes that pt was dx with UTI yesterday. This morning, pt had a fall at 8am. Prior to arrival, pt had a second fall which resulted in a laceration to her head. Pt denies neck pr back pain. Pt believes that she tripped and fell the second time. [facility] is more confused than usual today. Pt alert/oriented per EMS. .    Tests Performed: labs, imaging. Abnormal Results:   Labs Ordered and Resulted from Time of ED Arrival Up to the Time of Departure from the ED   BASIC METABOLIC PANEL - Abnormal; Notable for the following components:       Result Value    Glucose 140 (*)     All other components within normal limits   CBC WITH PLATELETS AND DIFFERENTIAL - Abnormal; Notable for the following components:     (*)     MCH 33.1 (*)     MCHC 31.4 (*)     All other components within normal limits   ROUTINE UA WITH MICROSCOPIC REFLEX TO CULTURE - Abnormal; Notable for the following components:    Ketones Urine 10  (*)     All other components within normal limits    Narrative:     Urine Culture not indicated   CBC WITH PLATELETS &  DIFFERENTIAL    Narrative:     The following orders were created for panel order CBC with platelets differential.  Procedure                               Abnormality         Status                     ---------                               -----------         ------                     CBC with platelets and d...[[id number]]  Abnormal            Final result                 Please view results for these tests on the individual orders.     Chest CT w/o contrast   Final Result   IMPRESSION:     1. Mildly displaced fractures of the posterior aspect of the left   eighth, ninth and 10th ribs.   2. Heterogenous sclerotic appearance of T5 vertebral body,   indeterminate, could be posttraumatic or related to sclerotic   metastasis. This can be further evaluated with thoracic spine MRI.   3. Trace left pleural effusion. No significant right pleural effusion.   No significant pneumothorax. Few scattered pulmonary nodules including   5 mm left upper lobe nodule, as per Fleischner's society criteria, for   low risk patient no routine follow-up is recommended, and for   high-risk patient, optional chest CT in 12 months can be considered.       KANNAN BRITO MD            SYSTEM ID:  [id number]      Cervical spine CT w/o contrast   Final Result   IMPRESSION:   HEAD CT:   1.  No acute abnormality.   2.  Mild generalized volume loss and presumed chronic small vessel   ischemic change.      CERVICAL SPINE CT:   1.  No CT evidence for acute fracture or post traumatic subluxation.   2.  Solid C3-C6 interbody fusion. Associated corpectomy.   3.  No instrumentation loosening or failure.   4.  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      Radiation dose for this scan was reduced using automated exposure   control, adjustment of the mA and/or kV according to patient size, or   iterative reconstruction technique      YEFRI ROD MD            SYSTEM ID:  [id number]      CT  Head w/o Contrast   Final Result   IMPRESSION:   HEAD CT:   1.  No acute abnormality.   2.  Mild generalized volume loss and presumed chronic small vessel   ischemic change.      CERVICAL SPINE CT:   1.  No CT evidence for acute fracture or post traumatic subluxation.   2.  Solid C3-C6 interbody fusion. Associated corpectomy.   3.  No instrumentation loosening or failure.   4.  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      Radiation dose for this scan was reduced using automated exposure   control, adjustment of the mA and/or kV according to patient size, or   iterative reconstruction technique      YEFRI ROD MD            SYSTEM ID:  [id number]        .   Treatments provided: N/A  Family Comments: Maye, daughter would like an update in morning  OBS brochure/video discussed/provided to patient:  Yes  ED Medications:   Medications   lidocaine 1% with EPINEPHrine 1:100,000 1 %-1:995225 injection (has no administration in time range)   oxyCODONE (ROXICODONE) tablet 5 mg (has no administration in time range)     Drips infusing:  No  For the majority of the shift, the patient's behavior Green. Interventions performed were N/A.    Sepsis treatment initiated: No     Patient tested for COVID 19 prior to admission: YES    ED Nurse Name/Phone Number: Abelino Sal RN,   6:25 PM    RECEIVING UNIT ED HANDOFF REVIEW    Above ED Nurse Handoff Report was reviewed: Yes  Reviewed by: Joan Valentino RN on September 22, 2021 at 9:07 PM

## 2021-09-22 NOTE — ED PROVIDER NOTES
History     Chief Complaint:  Fall and Head Injury    HPI   Pablo Baez is a 76 year old female with a history of Parkinson's, hypertension, osteoporosis, and scoliosis who presents via EMS from assisted living with left neck pain, moderate nonradiating left rib pain, and a laceration to the back of her head. Staff reported the patient fell multiple times today however the patient only recalls falling once. She denies any chest pain or shortness of breath, nor any abdominal pain, nausea, vomiting, fever, chills, or new cough. She denies current tobacco use. She occasionally drinks alcohol. She does have a history of falls due to Parkinson's and has chronic left shoulder pain from a previous fall.    Workup from 9/16  UA with microscopic: urine clarity hazy (A), ketones trace (A), blood small (A), leukocyte estimate large (A), red blood cells 8 (H), white blood cells 37 (H), mucus present (A), transitional epithelial cells occasional (A), calcium oxalate crystals present (A), o/w WNL       Review of Systems   Constitutional: Negative for chills and fever.   Respiratory: Negative for cough and shortness of breath.    Cardiovascular: Negative for chest pain.   Gastrointestinal: Negative for abdominal pain, nausea and vomiting.   Musculoskeletal: Positive for arthralgias (Left rib pain) and neck pain.   Skin: Positive for wound (Laceration on back of head).   All other systems reviewed and are negative.      Allergies:  Lisinopril    Medications:    Avelox  Glucosamine  Cozaar  Sinemet  Robinul  Prilosec  Gabapentin  Macrobid  Seroquel  Robaxin  Senokot    Past Medical History:    Psychotic disorder  Left displaced femoral neck fracture  Parkinson  Hypertension  Basal cell carcinoma  Cervical spondylosis with myelopathy  Spondylolisthesis of cervical region  Calculus of kidney  DJD of cervical spine  Presbyopio  Varicose veins   Myopia  Benign positional vertigo   Hypermetropia  Osteoporosis    Past Surgical  History:    [place] ligate fallopian tube  Shoulder surgery  Hip surgery, right  Spinal fusion   Hernia repair     Family History:    Hypertension - father, mother  Osteopesosis - mother  Breast cancer - sister    Social History:  The patient lives at an assisted living facility.  The patient presents alone.    Physical Exam     Patient Vitals for the past 24 hrs:   BP Temp Temp src Pulse Resp SpO2   09/22/21 1910 -- -- -- -- -- 100 %   09/22/21 1905 -- -- -- -- -- 99 %   09/22/21 1900 -- -- -- -- -- 99 %   09/22/21 1855 -- -- -- -- -- 99 %   09/22/21 1850 134/61 -- -- 67 -- 98 %   09/22/21 1830 -- -- -- -- -- 93 %   09/22/21 1810 -- -- -- -- -- 94 %   09/22/21 1805 -- -- -- -- -- 94 %   09/22/21 1800 -- -- -- -- -- 95 %   09/22/21 1755 -- -- -- -- -- 95 %   09/22/21 1743 134/58 -- -- 71 18 98 %   09/22/21 1524 -- -- -- -- 15 --   09/22/21 1505 -- -- -- -- -- 95 %   09/22/21 1500 -- -- -- -- -- 96 %   09/22/21 1412 -- 97.7  F (36.5  C) Temporal -- -- --   09/22/21 1408 -- -- -- -- -- 97 %   09/22/21 1407 138/60 -- -- 74 -- 97 %       Physical Exam  Vital signs reviewed.  Nursing note reviewed.  Constitutional: Well-developed, Well-nourished.  Non-diaphoretic.  Mild distress    HENT: Laceration to left posterior occipital scalp.  No pain or instability to palpation of bony orbits, mandible, maxilla.  Good jaw opening.  No malocclusion.  No nasal septal hematoma.  OP clear and moist.    EYES:  PERRL.  EOMI.  NECK:  No Cspine tenderness.  Trachea midline.  Full ROM.  Supple. No stridor.  CARDIAC:  RRR. 2+ bilat radial pulses   CHEST: Left rib tenderness  PULM: Effort  Normal.  Breath sounds clear and equal bilat  ABD:  Soft, NT/ND  No guarding, no rebound.  : No CVA T.    BACK:  No T or L spinous process tenderness.  Pelvis stable.  EXT:  Full ROM X4.  No tenderness, edema, crepitus or obvious deformity  NEURO:  Alert, Oriented X3.  5/5 UE and LE, proximal and distal.  Sensation intact to LT.   SKIN :  Warm.  Dry.   No  erythema.  No rash  PSYCH.:  Normal judgment.  Normal affect.      Emergency Department Course     Imaging:  Chest CT w/o contrast                                                                   IMPRESSION:    1. Mildly displaced fractures of the posterior aspect of the left  eighth, ninth and 10th ribs.  2. Heterogenous sclerotic appearance of T5 vertebral body,  indeterminate, could be posttraumatic or related to sclerotic  metastasis. This can be further evaluated with thoracic spine MRI.  3. Trace left pleural effusion. No significant right pleural effusion.  No significant pneumothorax. Few scattered pulmonary nodules including  5 mm left upper lobe nodule, as per Fleischner's society criteria, for  low risk patient no routine follow-up is recommended, and for  high-risk patient, optional chest CT in 12 months can be considered.  As read by Radiology.    CT Head w/o contrast  IMPRESSION:  HEAD CT:  1.  No acute abnormality.  2.  Mild generalized volume loss and presumed chronic small vessel  ischemic change.  As read by Radiology.    Cervical spine CT w/o contrast  IMPRESSION:  CERVICAL SPINE CT:  1.  No CT evidence for acute fracture or post traumatic subluxation.  2.  Solid C3-C6 interbody fusion. Associated corpectomy.  3.  No instrumentation loosening or failure.  4.  Chronic degenerative change results in moderate bilateral C2-3 and  C6-7 neural foraminal stenoses..  As read by Radiology.    Laboratory:  UA with microscopic: ketones 10 (A) o/w WNL     Asymptomatic COVID19 Virus PCR by nasopharyngeal swab negative     CBC: WBC 8.3, HGB 13.7,      BMP: glucose 140 (H) o/w WNL (Creatinine 0.57)       Procedures:    Laceration Repair        LACERATION:  A simple clean 4 cm laceration.      LOCATION:  Left posterior occipital scalp      PREPARATION:  Irrigation and Scrubbing with Normal Saline      DEBRIDEMENT:  no debridement      CLOSURE:  Wound was closed with 8 Staples      Emergency Department  Course:    Reviewed:  I reviewed nursing notes, vitals, past history and care everywhere    Assessments:  1415 I obtained history and examined the patient as noted above.   1730 I rechecked the patient and explained findings.   175 I repaired the laceration as noted above.    Consults:    I spoke with Dr. Joseph, admitting hospitalist.    Disposition:  The patient was admitted to the hospital under the care of Dr. Joseph.    Impression & Plan      Medical Decision Makin year old female presenting w/ head injury, rib pain s/p mechanical fall     DDx includes mechanical fall, fracture, contusion, intracranial hemorrhage, skull fracture.  Doubt seizure, syncope, CVA given history and physical exam.  No other evidence of trauma on full primary and secondary trauma surveys other than areas imaged above or documented in physical exam.  Given recent diagnosis of UTI recently and concern for possible confusion, labs ordered.  Labs returned largely unremarkable, UA inconsistent with infection.  Imaging sig for multiple left-sided rib fractures as described above, no intracranial hemorrhage.  Interventions as noted above.  Tetanus immunization up-to-date as of 2018.  Laceration repaired as noted above.  Given multiple falls, generalized weakness, multiple rib fractures, patient mid to the hospitalist service for further evaluation management including pain control. Pt counseled on all results, disposition and diagnosis.  They are understanding and agreeable to plan. Patient admitted in stable condition.         Diagnosis:    ICD-10-CM    1. Closed fracture of multiple ribs of left side, initial encounter  S22.42XA    2. Confusion  R41.0    3. Laceration of scalp, initial encounter  S01.01XA    4. Multiple falls  R29.6        Scribe Disclosure:  I, Etta Spring (trainee) and Tona Villa (), am serving as a scribe at 2:03 PM on 2021 to document services personally performed by Carlos  Luis M LIU MD based on my observations and the provider's statements to me.      Luis M Gonzalez MD  09/22/21 2034

## 2021-09-22 NOTE — H&P
[facility]    History and Physical  Hospitalist       Date of Admission:  9/22/2021  Date of Service (when I saw the patient): 09/22/21    Assessment & Plan   Pablo Baez is a 76 year old female patient with past medical history of Parkinson's disease, psychotic disorder, hypertension, cervical spondylosis with myelopathy, degenerative joint disease, kidney stones, osteopenia, who resides at assisted living was brought to emergency room for evaluation for multiple falls.  She fell on her back and hit her head.  She denies passing out.  Her vital signs were stable on arrival to emergency room.  CT of the cervical spine was done and showed no CT evidence for acute fracture or posttraumatic subluxation.  There is solid C3-C6 interbody fusion.  CT of the chest without contrast showed mildly displaced fractures of the posterior aspect of the left eighth, ninth and 10th ribs.  There is trace left pleural effusion and no evidence of significant pneumothorax.  She was given oxycodone 5 mg p.o. x1 dose.  Her scalp laceration was also stapled.  She was admitted to hospital for further management.    1.  Multiple falls, fractures of left eighth, ninth and 10th ribs with mild displacement  Because of her multiple falls likely related to her underlying Parkinson's, advanced age  There is no evidence of pneumothorax.  Currently she denies significant pain.  Her oxygen saturation is stable.  We will put her on as needed Tylenol, oral oxycodone.  We will also put her on gabapentin.  Will consult PT/OT for evaluation and recommendations    2.  Left occipital scalp laceration, clipped in the ER  No evidence of bleeding at this time.    3.  History of Parkinson's disease  Her home medications not reviewed yet.    4.  Hypertension: Blood pressure stable at this time.  Will resume PTA meds once reviewed by pharmacy    We will admit patient to medical floor    DVT Prophylaxis: Pneumatic Compression  Devices  Code Status: Full Code    Disposition: Expected discharge 2 nights     Enrico Joseph MD    Primary Care Physician   Uvaldo Sterling    Chief Complaint   Multiple falls    History is obtained from the patient    History of Present Illness   Pablo Baez is a 76 year old female patient with past medical history of Parkinson's disease, psychotic disorder, hypertension, cervical spondylosis with myelopathy, degenerative joint disease, kidney stones, osteopenia, who resides at assisted living was brought to emergency room for evaluation for multiple falls.  Per report obtained from assisted [facility], patient had multiple falls today but patient remembers falling only once.  She had a fall today and hit back of her head.  She denies passing out.  She complains of neck pain and left-sided chest pain.  She also denies shortness of breath or cough.  She has no abdominal pain, nausea, vomiting, dysuria, urgency or frequency.  She has no fever.  She denies alcohol use.  She stated that she has chronic left shoulder pain.  On arrival to emergency room, her vital signs were checked and showed temperature 97.7, pulse 74, blood pressure 138/60, oxygen saturation 97% on room air.  On exam, she was noted to have scalp laceration and tenderness on the left chest.  Laboratory work-up showed sodium 140, potassium 3.7, creatinine 0.57, WBC 8.3, hemoglobin 13.7.  CT of the cervical spine was done and showed no CT evidence for acute fracture or posttraumatic subluxation.  There is solid C3-C6 interbody fusion.  CT of the chest without contrast showed mildly displaced fractures of the posterior aspect of the left eighth, ninth and 10th ribs.  There is trace left pleural effusion and no evidence of significant pneumothorax.  In the ER, scalp laceration was stapled.  She was given oxycodone 5 mg p.o. in the ER.  She was admitted for further management.    Past Medical History    I have reviewed this patient's medical  history and updated it with pertinent information if needed.   Psychotic disorder  Left displaced femoral neck fracture  Parkinson  Hypertension  Basal cell carcinoma  Cervical spondylosis with myelopathy  Spondylolisthesis of cervical region  Calculus of kidney  DJD of cervical spine  Presbyopio  Myopia  Hypermetropia  Osteopenia    Past Surgical History   I have reviewed this patient's surgical history and updated it with pertinent information if needed.  [place] ligate fallopian tube  Shoulder surgery  Hip surgery    Prior to Admission Medications   None     Allergies   Not on File    Social History   I have reviewed this patient's social history and updated it with pertinent information if needed. Pablo Baez      Family History   I have reviewed this patient's family history and updated it with pertinent information if needed.   Hypertension - father, mother  Osteopesosis - mother  Breast cancer - sister    Review of Systems   The 10 point Review of Systems is negative other than noted in the HPI or here. Multiple falls    Physical Exam   Temp: 97.7  F (36.5  C) Temp src: Temporal BP: 134/58 Pulse: 71   Resp: 18 SpO2: 93 % O2 Device: None (Room air)    Vital Signs with Ranges  Temp:  [97.7  F (36.5  C)] 97.7  F (36.5  C)  Pulse:  [71-74] 71  Resp:  [15-18] 18  BP: (134-138)/(58-60) 134/58  SpO2:  [93 %-98 %] 93 %  0 lbs 0 oz    GEN:  Alert, oriented x 3, appears comfortable, NAD.  HEENT: Left occipital scalp laceration.  No scleral icterus, no nasal discharge, mouth moist.  CV:  Regular rate and rhythm, no murmur or JVD.  S1 + S2 noted, no S3 or S4.  Chest: Tenderness on the left chest  LUNGS:  Clear to auscultation bilaterally without rales/rhonchi/wheezing/retractions.  Symmetric chest rise on inhalation noted.  ABD:  Active bowel sounds, soft, non-tender/non-distended.  No rebound/guarding/rigidity.  EXT:  No edema or cyanosis.  Hands/feet warm to touch with good signs of peripheral perfusion.  No joint  synovitis noted.  SKIN:  Dry to touch, no exanthems noted in the visualized areas.  NEURO:  Symmetric muscle strength, sensation to touch grossly intact.  No new focal deficits appreciated.    Data   Data reviewed today:  I personally reviewed     Recent Labs   Lab 09/22/21  1445   WBC 8.3   HGB 13.7   *         POTASSIUM 3.7   CHLORIDE 105   CO2 30   BUN 13   CR 0.57   ANIONGAP 5   GIOVANI 9.2   *       Recent Results (from the past 24 hour(s))   CT Head w/o Contrast    Narrative    CT OF THE HEAD AND CERVICAL SPINE WITHOUT CONTRAST   9/22/2021 4:30 PM     COMPARISON: None    HISTORY:  Traumatic head and cervical spine injury     TECHNIQUE:   HEAD CT: Axial CT images of the head from the skull base to the vertex  were acquired without IV contrast.  CERVICAL SPINE CT: Axial images of the cervical spine were acquired  without intravenous contrast. Multiplanar reformations were created.      FINDINGS:   HEAD CT:  INTRACRANIAL CONTENTS: No intracranial hemorrhage, extraaxial  collection, or mass effect.  No CT evidence of acute infarct. Mild  presumed chronic small vessel ischemic change. Mild generalized volume  loss.    VISUALIZED ORBITS/SINUSES/MASTOIDS: No significant orbital  abnormality. No significant paranasal sinus mucosal disease. No  significant middle ear or mastoid effusion.    OSSEOUS STRUCTURES/SOFT TISSUES: No significant abnormality.    CERVICAL SPINE CT:  VERTEBRA: Normal vertebral body heights. Solid C3-C6 interbody fusion.  Associated C4 corpectomy. The associated ventral fusion plate is  anchored at C3, C5 and C6. No instrumentation loosening or failure. No  fracture or posttraumatic subluxation.    CANAL/FORAMINA: Chronic degenerative change results in moderate  bilateral C2-3 and moderate bilateral C6-7 neural foraminal stenoses.  No high-grade spinal canal stenosis.    PARASPINAL: No extraspinal abnormality. Visualized lung fields are  clear.      Impression     IMPRESSION:  HEAD CT:  1.  No acute abnormality.  2.  Mild generalized volume loss and presumed chronic small vessel  ischemic change.    CERVICAL SPINE CT:  1.  No CT evidence for acute fracture or post traumatic subluxation.  2.  Solid C3-C6 interbody fusion. Associated corpectomy.  3.  No instrumentation loosening or failure.  4.  Chronic degenerative change results in moderate bilateral C2-3 and  C6-7 neural foraminal stenoses.    Radiation dose for this scan was reduced using automated exposure  control, adjustment of the mA and/or kV according to patient size, or  iterative reconstruction technique    YEFRI ROD MD         SYSTEM ID:  [id number]   Cervical spine CT w/o contrast    Narrative    CT OF THE HEAD AND CERVICAL SPINE WITHOUT CONTRAST   9/22/2021 4:30 PM     COMPARISON: None    HISTORY:  Traumatic head and cervical spine injury     TECHNIQUE:   HEAD CT: Axial CT images of the head from the skull base to the vertex  were acquired without IV contrast.  CERVICAL SPINE CT: Axial images of the cervical spine were acquired  without intravenous contrast. Multiplanar reformations were created.      FINDINGS:   HEAD CT:  INTRACRANIAL CONTENTS: No intracranial hemorrhage, extraaxial  collection, or mass effect.  No CT evidence of acute infarct. Mild  presumed chronic small vessel ischemic change. Mild generalized volume  loss.    VISUALIZED ORBITS/SINUSES/MASTOIDS: No significant orbital  abnormality. No significant paranasal sinus mucosal disease. No  significant middle ear or mastoid effusion.    OSSEOUS STRUCTURES/SOFT TISSUES: No significant abnormality.    CERVICAL SPINE CT:  VERTEBRA: Normal vertebral body heights. Solid C3-C6 interbody fusion.  Associated C4 corpectomy. The associated ventral fusion plate is  anchored at C3, C5 and C6. No instrumentation loosening or failure. No  fracture or posttraumatic subluxation.    CANAL/FORAMINA: Chronic degenerative change results in moderate  bilateral C2-3  and moderate bilateral C6-7 neural foraminal stenoses.  No high-grade spinal canal stenosis.    PARASPINAL: No extraspinal abnormality. Visualized lung fields are  clear.      Impression    IMPRESSION:  HEAD CT:  1.  No acute abnormality.  2.  Mild generalized volume loss and presumed chronic small vessel  ischemic change.    CERVICAL SPINE CT:  1.  No CT evidence for acute fracture or post traumatic subluxation.  2.  Solid C3-C6 interbody fusion. Associated corpectomy.  3.  No instrumentation loosening or failure.  4.  Chronic degenerative change results in moderate bilateral C2-3 and  C6-7 neural foraminal stenoses.    Radiation dose for this scan was reduced using automated exposure  control, adjustment of the mA and/or kV according to patient size, or  iterative reconstruction technique    YEFRI ROD MD         SYSTEM ID:  [id number]   Chest CT w/o contrast    Narrative    CT CHEST WITHOUT CONTRAST  9/22/2021 4:31 PM    HISTORY:  Trauma, chest injury. Left rib tenderness.    TECHNIQUE:  Scans obtained from the apices through the diaphragm  without IV contrast. Radiation dose for this scan was reduced using  automated exposure control, adjustment of the mA and/or kV according  to patient size, or iterative reconstruction technique.    COMPARISON:  None available    FINDINGS:   Chest/mediastinum: Cardiomegaly. No significant pericardial effusion.  No significant mediastinal, hilar or axillary lymphadenopathy.    Lung/pleura: Trace left pleural effusion. No significant right pleural  effusion. No significant pneumothorax. Bibasilar pulmonary opacities,  likely atelectasis. Scattered areas of mucoid impaction, for example  in the right upper lobe (series 4 image 96), can be seen with  respiratory bronchiolitis. Few scattered pulmonary nodules including 5  mm left upper lobe nodule (series 4 image 125)    Upper abdomen: Limited evaluation of the upper abdomen due to lack of  coverage and contrast.    Bones and  soft tissue: Multilevel degenerative changes of the spine.  Heterogenous sclerotic appearance of L5 vertebra, indeterminant, could  be posttraumatic or metastatic. Minimally displaced fractures of the  posterior aspect of the left eighth (series 4 image 138), ninth  (series 4 image 165) and 10th ribs (series 4 image 234). Postsurgical  changes of the lower cervical spine.      Impression    IMPRESSION:    1. Mildly displaced fractures of the posterior aspect of the left  eighth, ninth and 10th ribs.  2. Heterogenous sclerotic appearance of T5 vertebral body,  indeterminate, could be posttraumatic or related to sclerotic  metastasis. This can be further evaluated with thoracic spine MRI.  3. Trace left pleural effusion. No significant right pleural effusion.  No significant pneumothorax. Few scattered pulmonary nodules including  5 mm left upper lobe nodule, as per Fleischner's society criteria, for  low risk patient no routine follow-up is recommended, and for  high-risk patient, optional chest CT in 12 months can be considered.     KANNAN BRITO MD         SYSTEM ID:  [id number]

## 2021-09-22 NOTE — ED NOTES
Maye, daughter called for an update. Daughter is given an update on plan of pt's care. Daughter acknowledges.

## 2021-09-23 ENCOUNTER — APPOINTMENT (OUTPATIENT)
Dept: OCCUPATIONAL THERAPY | Facility: CLINIC | Age: 76
DRG: 185 | End: 2021-09-23
Attending: INTERNAL MEDICINE
Payer: MEDICARE

## 2021-09-23 ENCOUNTER — APPOINTMENT (OUTPATIENT)
Dept: PHYSICAL THERAPY | Facility: CLINIC | Age: 76
DRG: 185 | End: 2021-09-23
Attending: INTERNAL MEDICINE
Payer: MEDICARE

## 2021-09-23 LAB
ANION GAP SERPL CALCULATED.3IONS-SCNC: 5 MMOL/L (ref 3–14)
BASOPHILS # BLD AUTO: 0 10E3/UL (ref 0–0.2)
BASOPHILS NFR BLD AUTO: 0 %
BUN SERPL-MCNC: 14 MG/DL (ref 7–30)
CALCIUM SERPL-MCNC: 9.1 MG/DL (ref 8.5–10.1)
CHLORIDE BLD-SCNC: 107 MMOL/L (ref 94–109)
CO2 SERPL-SCNC: 28 MMOL/L (ref 20–32)
CREAT SERPL-MCNC: 0.68 MG/DL (ref 0.52–1.04)
EOSINOPHIL # BLD AUTO: 0.1 10E3/UL (ref 0–0.7)
EOSINOPHIL NFR BLD AUTO: 1 %
ERYTHROCYTE [DISTWIDTH] IN BLOOD BY AUTOMATED COUNT: 12.8 % (ref 10–15)
GFR SERPL CREATININE-BSD FRML MDRD: 86 ML/MIN/1.73M2
GLUCOSE BLD-MCNC: 99 MG/DL (ref 70–99)
HCT VFR BLD AUTO: 39.5 % (ref 35–47)
HGB BLD-MCNC: 12.6 G/DL (ref 11.7–15.7)
IMM GRANULOCYTES # BLD: 0 10E3/UL
IMM GRANULOCYTES NFR BLD: 0 %
LYMPHOCYTES # BLD AUTO: 1.5 10E3/UL (ref 0.8–5.3)
LYMPHOCYTES NFR BLD AUTO: 22 %
MCH RBC QN AUTO: 33.7 PG (ref 26.5–33)
MCHC RBC AUTO-ENTMCNC: 31.9 G/DL (ref 31.5–36.5)
MCV RBC AUTO: 106 FL (ref 78–100)
MONOCYTES # BLD AUTO: 0.5 10E3/UL (ref 0–1.3)
MONOCYTES NFR BLD AUTO: 8 %
NEUTROPHILS # BLD AUTO: 4.6 10E3/UL (ref 1.6–8.3)
NEUTROPHILS NFR BLD AUTO: 69 %
NRBC # BLD AUTO: 0 10E3/UL
NRBC BLD AUTO-RTO: 0 /100
PLATELET # BLD AUTO: 348 10E3/UL (ref 150–450)
POTASSIUM BLD-SCNC: 3.8 MMOL/L (ref 3.4–5.3)
RBC # BLD AUTO: 3.74 10E6/UL (ref 3.8–5.2)
SODIUM SERPL-SCNC: 140 MMOL/L (ref 133–144)
WBC # BLD AUTO: 6.7 10E3/UL (ref 4–11)

## 2021-09-23 PROCEDURE — 250N000013 HC RX MED GY IP 250 OP 250 PS 637: Performed by: INTERNAL MEDICINE

## 2021-09-23 PROCEDURE — 97165 OT EVAL LOW COMPLEX 30 MIN: CPT | Mod: GO

## 2021-09-23 PROCEDURE — 85025 COMPLETE CBC W/AUTO DIFF WBC: CPT | Performed by: INTERNAL MEDICINE

## 2021-09-23 PROCEDURE — 120N000001 HC R&B MED SURG/OB

## 2021-09-23 PROCEDURE — 97116 GAIT TRAINING THERAPY: CPT | Mod: GP | Performed by: PHYSICAL THERAPIST

## 2021-09-23 PROCEDURE — 97530 THERAPEUTIC ACTIVITIES: CPT | Mod: GP | Performed by: PHYSICAL THERAPIST

## 2021-09-23 PROCEDURE — 99232 SBSQ HOSP IP/OBS MODERATE 35: CPT | Performed by: INTERNAL MEDICINE

## 2021-09-23 PROCEDURE — 80048 BASIC METABOLIC PNL TOTAL CA: CPT | Performed by: INTERNAL MEDICINE

## 2021-09-23 PROCEDURE — 99222 1ST HOSP IP/OBS MODERATE 55: CPT | Performed by: SURGERY

## 2021-09-23 PROCEDURE — 250N000013 HC RX MED GY IP 250 OP 250 PS 637: Performed by: HOSPITALIST

## 2021-09-23 PROCEDURE — 97162 PT EVAL MOD COMPLEX 30 MIN: CPT | Mod: GP | Performed by: PHYSICAL THERAPIST

## 2021-09-23 PROCEDURE — 97535 SELF CARE MNGMENT TRAINING: CPT | Mod: GO

## 2021-09-23 PROCEDURE — 99221 1ST HOSP IP/OBS SF/LOW 40: CPT | Performed by: PHYSICIAN ASSISTANT

## 2021-09-23 PROCEDURE — 36415 COLL VENOUS BLD VENIPUNCTURE: CPT | Performed by: INTERNAL MEDICINE

## 2021-09-23 RX ORDER — ACETAMINOPHEN 650 MG/1
650 SUPPOSITORY RECTAL EVERY 4 HOURS PRN
Status: DISCONTINUED | OUTPATIENT
Start: 2021-09-23 | End: 2021-09-24 | Stop reason: HOSPADM

## 2021-09-23 RX ORDER — NITROFURANTOIN 25; 75 MG/1; MG/1
100 CAPSULE ORAL 2 TIMES DAILY
Status: DISCONTINUED | OUTPATIENT
Start: 2021-09-23 | End: 2021-09-24

## 2021-09-23 RX ORDER — ACETAMINOPHEN 325 MG/1
650 TABLET ORAL EVERY 4 HOURS PRN
Status: DISCONTINUED | OUTPATIENT
Start: 2021-09-23 | End: 2021-09-24 | Stop reason: HOSPADM

## 2021-09-23 RX ADMIN — CARBIDOPA AND LEVODOPA 2 TABLET: 25; 100 TABLET, EXTENDED RELEASE ORAL at 00:39

## 2021-09-23 RX ADMIN — NITROFURANTOIN (MONOHYDRATE/MACROCRYSTALS) 100 MG: 75; 25 CAPSULE ORAL at 21:02

## 2021-09-23 RX ADMIN — CARBIDOPA AND LEVODOPA 2 TABLET: 25; 100 TABLET, EXTENDED RELEASE ORAL at 22:56

## 2021-09-23 RX ADMIN — GLYCOPYRROLATE 1 MG: 1 TABLET ORAL at 08:57

## 2021-09-23 RX ADMIN — ACETAMINOPHEN 650 MG: 325 TABLET, FILM COATED ORAL at 15:41

## 2021-09-23 RX ADMIN — Medication 3 HALF-TAB: at 12:15

## 2021-09-23 RX ADMIN — Medication 2000 UNITS: at 08:57

## 2021-09-23 RX ADMIN — CARBIDOPA AND LEVODOPA 2 TABLET: 25; 100 TABLET ORAL at 20:15

## 2021-09-23 RX ADMIN — LOSARTAN POTASSIUM 25 MG: 25 TABLET, FILM COATED ORAL at 08:57

## 2021-09-23 RX ADMIN — OMEPRAZOLE 20 MG: 20 CAPSULE, DELAYED RELEASE ORAL at 08:57

## 2021-09-23 RX ADMIN — QUETIAPINE FUMARATE 25 MG: 25 TABLET ORAL at 21:02

## 2021-09-23 RX ADMIN — CARBIDOPA AND LEVODOPA 2 TABLET: 25; 100 TABLET ORAL at 06:39

## 2021-09-23 RX ADMIN — GABAPENTIN 100 MG: 100 CAPSULE ORAL at 21:02

## 2021-09-23 RX ADMIN — CARBIDOPA AND LEVODOPA 2 TABLET: 25; 100 TABLET ORAL at 15:41

## 2021-09-23 RX ADMIN — ACETAMINOPHEN 650 MG: 325 TABLET, FILM COATED ORAL at 21:02

## 2021-09-23 ASSESSMENT — ACTIVITIES OF DAILY LIVING (ADL)
DEPENDENT_IADLS:: MEDICATION MANAGEMENT;MEAL PREPARATION
ADLS_ACUITY_SCORE: 11
ADLS_ACUITY_SCORE: 11
ADLS_ACUITY_SCORE: 9
ADLS_ACUITY_SCORE: 11
ADLS_ACUITY_SCORE: 9
ADLS_ACUITY_SCORE: 9

## 2021-09-23 NOTE — CONSULTS
THORACIC SURGERY CONSULT NOTE    Consult Reason: Multiple rib fractures    HPI: Pablo Baez is a 76 year old woman who presented to the ED on 9/22/2021 for multiple falls.  She was noted to have multiple left posterior rib fractures without pneumothorax.  She admits to falling a few weeks ago and she thinks she may have injured her ribs at that time, but does not remember striking her side this most recent fall.  Pain control is pretty decent and her breathing is close to normal.  She has been able to ambulate pretty well.    A/P: Patient is a 76 year old female with multiple left-sided rib fractures from mechanical fall.  -No flail chest and no pneumothorax  -No surgical intervention recommended  -Pain control  -IS  -Ambulate    Patient and plan discussed with attending.    Thank you for the opportunity to participate in the care of this patient.    PMH:  No past medical history on file.      PSH:  No past surgical history on file.      FH:  family history is not on file.      SH:  Former tobacco use     Allergies:  No Known Allergies    Home Meds:  Medications Prior to Admission   Medication Sig Dispense Refill Last Dose     acetaminophen (TYLENOL) 500 MG tablet Take 1,000 mg by mouth 3 times daily   9/22/2021 at Unknown time     carbidopa-levodopa (SINEMET CR)  MG CR tablet Take 1 tablet by mouth daily at 11pm.  Indications: Parkinson's Disease   9/21/2021 at 11pm     carbidopa-levodopa (SINEMET)  MG tablet TAKE 2 TABS BY MOUTH @ 7:30AM, 1 & 1/2 TAB @ 12PM, 2 TABS @ 4:30PM, & THEN 2 TABS @ 9PM (PARKINSON)   9/22/2021 at AM x2     cholecalciferol 50 MCG (2000 UT) tablet Take 2,000 Units by mouth daily   9/22/2021 at am     gabapentin (NEURONTIN) 100 MG capsule Take 100 mg by mouth At Bedtime   9/21/2021 at hs     glycopyrrolate (ROBINUL) 1 MG tablet Take 1 tablet by mouth every morning   9/22/2021 at am     ketoconazole (NIZORAL) 2 % external shampoo APPLY TOPICALLY EVERY OTHER DAY. INDICATIONS   TINEA VERSICOLOR        losartan (COZAAR) 25 MG tablet Take 25 mg by mouth daily   9/22/2021 at am     methocarbamol (ROBAXIN) 500 MG tablet Take 1 tablet by mouth 2 times daily as needed    at prn     neomycin-polymixin-dexamethasone (MAXITROL) ophthalmic ointment Place 0.25 inches into both eyes At Bedtime   9/21/2021 at hs     nitroFURantoin macrocrystal-monohydrate (MACROBID) 100 MG capsule Take 100 mg by mouth 2 times daily   9/22/2021 at am     omeprazole (PRILOSEC) 20 MG DR capsule Take 20 mg by mouth daily   9/22/2021 at am     Pediatric Multivit-Minerals-C (GUMMI BEAR MULTIVITAMIN/MIN PO) Take 2 tablets by mouth every morning   9/22/2021 at am     polyvinyl alcohol (LIQUIFILM TEARS) 1.4 % ophthalmic solution Place 1 drop into both eyes every 4 hours as needed    at prn     QUEtiapine (SEROQUEL) 25 MG tablet Take 1 tablet by mouth At Bedtime   9/21/2021 at hs       ROS: No fever, no chills, no worsening chest pain or shortness of breath.    Physical Exam:  Temp:  [97.9  F (36.6  C)-98.2  F (36.8  C)] 98.2  F (36.8  C)  Pulse:  [67-74] 71  Resp:  [15-18] 18  BP: (128-140)/(52-61) 138/52  SpO2:  [93 %-100 %] 96 %    Gen: NAD, resting comfortably in bed  Lungs: Non-labored breathing  Abd: SOft  Ext: No pitting edema  Neuro: AOx3    Labs:  CBC  Recent Labs   Lab 09/23/21  0720 09/22/21  1445   WBC 6.7 8.3   HGB 12.6 13.7    356     BMP  Recent Labs   Lab 09/23/21  0720 09/22/21  1445    140   POTASSIUM 3.8 3.7   CHLORIDE 107 105   CO2 28 30   BUN 14 13   CR 0.68 0.57   GLC 99 140*       Imaging:  CT chest: Left posterior rib fractures, no pneumothorax, no flail chest          Nilsa Jones PA-C

## 2021-09-23 NOTE — PROGRESS NOTES
Cross cover.  Patient had requested to resume her Macrobid as prior to admission.  She has been taking it for a urinary infection and is not clear for how long she should continue.  I have reordered the medication but it has to be reviewed by the rounder in the morning.

## 2021-09-23 NOTE — CONSULTS
Consult Date: 09/23/2021    REASON FOR CONSULTATION:  Fall with left-sided rib fractures.    HISTORY OF PRESENT ILLNESS:  Ms. Baez is a 76-year-old female with a history of Parkinson's and falls in the past.  She came to our hospital yesterday afternoon via EMS with a laceration on the back of her head from several falls that were sustained at her assisted living.  She describes having several falls where she tripped over her walker over the last couple of months and was actually at [facility] recently for a similar event.  At that time, she had some left-sided chest pain, but did not have any identified rib fractures, per her history.  On this occasion, the patient had a laceration on her posterior scalp, which was stapled in the ED.  Subsequent imaging showed 3 mildly displaced posterior left-sided rib fractures without underlying pneumothorax.  The patient states that she is breathing relatively comfortably and has no other site of injury or pain identified since her admission.  She does also relate having several hard falls where she struck her buttocks either on the ground or a chair.  She did not lose consciousness, but is still somewhat hazy about the events surrounding her most recent trauma.     PAST MEDICAL HISTORY AND PAST SURGICAL HISTORY:  Includes Parkinson's, hypertension, cervical stenosis, kidney stones, cervical spine DJD, varicose veins, myopia, vertigo and osteoporosis.  She has had a previous fallopian tube ligation, shoulder surgery, hip surgery, spinal fusion and hernia repair.    CURRENT MEDICATIONS:  At the time of admission, the patient was on Sinemet, multivitamins, artificial tears, Tylenol, cholecalciferol, gabapentin, Robinul, Cozaar, Robaxin, Prilosec and Seroquel.    ALLERGIES:  THE PATIENT HAS NO REPORTED DRUG ALLERGIES.    SOCIAL HISTORY:  The patient is in assisted living.  She is alone.  She is not a smoker.    PHYSICAL EXAMINATION:    VITAL SIGNS:  Ms. Baez is  afebrile.  Temperature is 98.2, pulse 71 with a blood pressure of 137/52, respiratory rate is 18 with 96% saturations on room air.  GENERAL:  She is alert and appropriate with GCS of 15.  She has a well-approximated left posterior scalp laceration, which is stapled.  She has otherwise no outward signs of trauma to the face or head.   NECK:  C-spine is nontender.    NEUROLOGIC:  Cranial nerves are grossly intact.  CARDIOVASCULAR:  Heart sounds are regular.  LUNGS:  Breath sounds are clear.  She has some mild tenderness in the left posterior chest wall without crepitus or ecchymosis.  ABDOMEN:  Benign.  EXTREMITIES:  Normal     LABORATORY DATA:  Notable for white blood cell count of 6.7 thousand and hemoglobin 12.6.  Electrolytes are all normal.  Asymptomatic COVID swab is negative.    IMAGING STUDIES:  The patient's CT of her head and C-spine were normal without acute fracture.  She has had a previous fusion.  CT of the chest shows mildly displaced posterior rib fractures on the left involving 8th, 9th and 10th ribs.  There is no underlying pneumothorax or pulmonary contusion.  There is a possible T5 age indeterminate fracture versus sclerotic lesion.    IMPRESSION AND RECOMMENDATIONS:  This is a 76-year-old female with a history of multiple falls, who has newly identified posterior rib fractures without flail chest.  Indeed, she is on room air, breathing comfortably at present.  I do not think she will need any surgical intervention for these.  I will defer the remainder of her treatment and workup to the admitting team.  The Thoracic Service has just rounded as well, and will likely have similar recommendations.    Thank you very much for this consultation.     Sampson Cespedes MD        D: 2021   T: 2021   MT: [id number]    Name:     BINH JUAREZ  MRN:      [id number]        Account:      [id number]   :      1945           Consult Date: 2021     Document: [id number]    cc:   Uvaldo Sterling MD

## 2021-09-23 NOTE — PLAN OF CARE
Pt. admitted at 2120, had a fall and head injury. Laceration to L head staples in place. L ribs fx 8-10, abraison and bruise to her back. A&O with intermittent confusion. VSS. Denies SOB, chest pain and N/V. Assist of 1. Reg diet/thin liquids.

## 2021-09-23 NOTE — PROGRESS NOTES
"   09/23/21 1400   Quick Adds   Type of Visit Initial PT Evaluation   Living Environment   People in home alone   Current Living Arrangements assisted living   Home Accessibility no concerns   Living Environment Comments assisted living resident,walk in shower with chair   Self-Care   Usual Activity Tolerance moderate   Current Activity Tolerance fair   Regular Exercise No   Equipment Currently Used at Home walker, rolling;shower chair;grab bar, toilet;grab bar, tub/shower   Activity/Exercise/Self-Care Comment Pt uses FWW to amb to DR for some meals, has A of 1 for bathing, \"sometimes\" call for help to get up in the night to the toilet. \"sometimes\" has assistance for dressing in AM   Disability/Function   Hearing Difficulty or Deaf no   Walking or Climbing Stairs ambulation difficulty, requires equipment;transferring difficulty, requires equipment   Fall history within last six months yes   Number of times patient has fallen within last six months 6   General Information   Onset of Illness/Injury or Date of Surgery 09/22/21   Referring Physician Enrico Joseph   Patient/Family Therapy Goals Statement (PT) PT would like to return to AL   Pertinent History of Current Problem (include personal factors and/or comorbidities that impact the POC) Summary of Stay: Pablo Baez is a 76 year old female with history of longstanding Parkinson's disease, hypertension, cervical spondylosis with myelopathy, DJD, kidney stones, osteopenia and reportedly lives in a assisted living set up and was brought in here earlier to the emergency room for multiple falls.    Existing Precautions/Restrictions fall   Weight-Bearing Status - LUE full weight-bearing   Weight-Bearing Status - RUE full weight-bearing   Weight-Bearing Status - LLE full weight-bearing   Weight-Bearing Status - RLE full weight-bearing   Cognition   Orientation Status (Cognition) oriented x 3   Affect/Mental Status (Cognition) WFL   Follows Commands (Cognition) " WFL   Pain Assessment   Patient Currently in Pain Yes, see Vital Sign flowsheet  (primarily with bed mob)   Range of Motion (ROM)   ROM Comment decreased trunk ROM due to pain from fx ribs   Strength Comprehensive (MMT)   Comment, General Manual Muscle Testing (MMT) Assessment decreased strength throughout, limited by pain and general deconditioning with age   Bed Mobility   Comment (Bed Mobility) bed mob with no rail and SBA for supine to sit, mod A for LE into bed, increased pain   Transfers   Transfer Safety Comments SIt to stand with FWW with CGA   Gait/Stairs (Locomotion)   [place] Level (Gait) contact guard;1 person to manage equipment;verbal cues;nonverbal cues (demo/gesture)   Assistive Device (Gait) walker, front-wheeled   Distance in Feet (Required for LE Total Joints) 420   Pattern (Gait) step-through   Deviations/Abnormal Patterns (Gait) gait speed decreased;festinating/shuffling;stride length decreased;yahaira decreased   Balance   Balance Comments decreased standing static and dynamic balance requires B UE support and SBA/CGA   Clinical Impression   Criteria for Skilled Therapeutic Intervention yes, treatment indicated   PT Diagnosis (PT) difficulty transferring and amb   Influenced by the following impairments pain, decreased ROM, balance, strength and act lizzette   Functional limitations due to impairments Impaitrf funvtionsl mob I and michael safety   Clinical Presentation Evolving/Changing   Clinical Presentation Rationale 3-5 deficits   Clinical Decision Making (Complexity) moderate complexity   Therapy Frequency (PT) Daily   Predicted Duration of Therapy Intervention (days/wks) 3 days   Planned Therapy Interventions (PT) gait training;bed mobility training;strengthening;transfer training   Risk & Benefits of therapy have been explained evaluation/treatment results reviewed;care plan/treatment goals reviewed;risks/benefits reviewed;current/potential barriers reviewed;participants voiced agreement  with care plan;patient;participants included   PT Discharge Planning    PT Discharge Recommendation (DC Rec) home with assist   PT Rationale for DC Rec Pt below baseline and will benefit from con inpt therapies to progress functional mob I and safety   PT Brief overview of current status  Pt requires CGA for amb and transfers, min to mod for sit to supine for LE due to pain.   Total Evaluation Time   Total Evaluation Time (Minutes) 12

## 2021-09-23 NOTE — CONSULTS
Care Management Initial Consult    General Information  Assessment completed with: Patient, Caregiver, Pablo  Type of CM/SW Visit: Offer D/C Planning    Primary Care Provider verified and updated as needed:     Readmission within the last 30 days:        Reason for Consult: discharge planning  Advance Care Planning:            Communication Assessment  Patient's communication style: spoken language (English or Bilingual)    Hearing Difficulty or Deaf: no   Wear Glasses or Blind: yes    Cognitive  Cognitive/Neuro/Behavioral: WDL        Orientation: disoriented to, situation, time             Living Environment:   People in home: facility resident     Current living Arrangements: assisted living      Able to return to prior arrangements: yes       Family/Social Support:  Care provided by:    Provides care for:    Marital Status:   Facility resident(s)/Staff          Description of Support System:           Current Resources:   Patient receiving home care services: No     Community Resources:    Equipment currently used at home: walker, rolling, shower chair, grab bar, toilet, grab bar, tub/shower  Supplies currently used at home:      Employment/Financial:  Employment Status:          Financial Concerns:             Lifestyle & Psychosocial Needs:  Social Determinants of Health     Tobacco Use:      Smoking Tobacco Use:      Smokeless Tobacco Use:    Alcohol Use:      Frequency of Alcohol Consumption:      Average Number of Drinks:      Frequency of Binge Drinking:    Financial Resource Strain:      Difficulty of Paying Living Expenses:    Food Insecurity:      Worried About Running Out of Food in the Last Year:      Ran Out of Food in the Last Year:    Transportation Needs:      Lack of Transportation (Medical):      Lack of Transportation (Non-Medical):    Physical Activity:      Days of Exercise per Week:      Minutes of Exercise per Session:    Stress:      Feeling of Stress :    Social Connections:       Frequency of Communication with Friends and Family:      Frequency of Social Gatherings with Friends and Family:      Attends Alevism Services:      Active Member of Clubs or Organizations:      Attends Club or Organization Meetings:      Marital Status:    Intimate Partner Violence:      Fear of Current or Ex-Partner:      Emotionally Abused:      Physically Abused:      Sexually Abused:    Depression:      PHQ-2 Score:    Housing Stability:      Unable to Pay for Housing in the Last Year:      Number of Places Lived in the Last Year:      Unstable Housing in the Last Year:        Functional Status:  Prior to admission patient needed assistance:   Dependent ADLs:: Ambulation-walker, Bathing  Dependent IADLs:: Medication Management, Meal Preparation         Additional Information:  RN CC/SW is following/consulted for discharge planning. Per chart review, pt resides in an Assisted Living Facility. Spoke to Pablo (pt/family member/guardian) to verify pt s residence at [facility]). Verbal permission was received to speak to the facility to verify services and to discuss the discharge plan of care. A call was placed to [facility]) and services were verified. Pt receives services as follows: all meals, medications management, showers 3 times a week and has a call pendant.    [facility] contact (name/number): [phone] or [phone]  Barriers to pt returning: no  Baseline mobility: walker  Weekend returns: ok but in the morning, weekdays anytime  Time of preferred return: mornings on weekends, anytime durning the day  New meds/prescriptions: fill here and send with  Fax #: [phone]  Other: Facility uses [facility] and is also pts agency of choice if needed at discharge.    RN CC/SW will continue to follow for discharge planning and return to facility.    Leesa Brumfield RN, BSN CTS  Care Coordinator  [facility]   [phone]

## 2021-09-23 NOTE — PHARMACY-ADMISSION MEDICATION HISTORY
Admission medication history interview status for this patient is complete. See [facility] admission navigator for allergy information, prior to admission medications and immunization status.     Medication history interview done, indicate source(s): NA  Medication history resources (including written lists, pill bottles, clinic record):Med list from [facility]; called RN at [facility]  Pharmacy: [id number] - [place], [place] - [address]    Changes made to PTA medication list:  Added: all meds      Actions taken by pharmacist (provider contacted, etc):left sticky note and oJey'stanley provider     Additional medication history information:patient is taking 2 different strengths and formulation (both immediate release and controlled release) of Sinemet.    Medication reconciliation/reorder completed by provider prior to medication history?  N    Prior to Admission medications    Medication Sig Last Dose Taking? Auth Provider   acetaminophen (TYLENOL) 500 MG tablet Take 1,000 mg by mouth 3 times daily 9/22/2021 at Unknown time Yes Unknown, Entered By History   carbidopa-levodopa (SINEMET CR)  MG CR tablet Take 1 tablet by mouth daily at 11pm.  Indications: Parkinson's Disease 9/21/2021 at 11pm Yes Unknown, Entered By History   carbidopa-levodopa (SINEMET)  MG tablet TAKE 2 TABS BY MOUTH @ 7:30AM, 1 & 1/2 TAB @ 12PM, 2 TABS @ 4:30PM, & THEN 2 TABS @ 9PM (PARKINSON) 9/22/2021 at AM x2 Yes Unknown, Entered By History   cholecalciferol 50 MCG (2000 UT) tablet Take 2,000 Units by mouth daily 9/22/2021 at am Yes Unknown, Entered By History   gabapentin (NEURONTIN) 100 MG capsule Take 100 mg by mouth At Bedtime 9/21/2021 at hs Yes Unknown, Entered By History   glycopyrrolate (ROBINUL) 1 MG tablet Take 1 tablet by mouth every morning 9/22/2021 at am Yes Unknown, Entered By History   ketoconazole (NIZORAL) 2 % external shampoo APPLY TOPICALLY EVERY OTHER DAY. INDICATIONS  TINEA VERSICOLOR  Yes  Unknown, Entered By History   losartan (COZAAR) 25 MG tablet Take 25 mg by mouth daily 9/22/2021 at am Yes Unknown, Entered By History   methocarbamol (ROBAXIN) 500 MG tablet Take 1 tablet by mouth 2 times daily as needed  at prn Yes Unknown, Entered By History   neomycin-polymixin-dexamethasone (MAXITROL) ophthalmic ointment Place 0.25 inches into both eyes At Bedtime 9/21/2021 at hs Yes Unknown, Entered By History   nitroFURantoin macrocrystal-monohydrate (MACROBID) 100 MG capsule Take 100 mg by mouth 2 times daily 9/22/2021 at am Yes Unknown, Entered By History   omeprazole (PRILOSEC) 20 MG DR capsule Take 20 mg by mouth daily 9/22/2021 at am Yes Unknown, Entered By History   polyvinyl alcohol (LIQUIFILM TEARS) 1.4 % ophthalmic solution Place 1 drop into both eyes every 4 hours as needed  at prn Yes Unknown, Entered By History   QUEtiapine (SEROQUEL) 25 MG tablet Take 1 tablet by mouth At Bedtime 9/21/2021 at hs Yes Unknown, Entered By History

## 2021-09-23 NOTE — PROGRESS NOTES
"   09/23/21 1000   Quick Adds   Type of Visit Initial Occupational Therapy Evaluation   Living Environment   People in home alone   Current Living Arrangements assisted living   Home Accessibility no concerns   Living Environment Comments assisted living resident,walk in shower with chair   Self-Care   Usual Activity Tolerance moderate   Current Activity Tolerance fair   Equipment Currently Used at Home walker, rolling;shower chair;grab bar, toilet;grab bar, tub/shower   Activity/Exercise/Self-Care Comment has A of 1 for bathing, \"sometimes\" call for help to get up in the night to the toilet. \"sometimes\" has assistance for dressing in AM   Instrumental Activities of Daily Living (IADL)   IADL Comments has assistance at baseline from facility for all IADL   Disability/Function   Hearing Difficulty or Deaf no   General Information   Onset of Illness/Injury or Date of Surgery 09/23/21   Referring Physician Enrico Joseph MD   Patient/Family Therapy Goal Statement (OT) to go home    Additional Occupational Profile Info/Pertinent History of Current Problem Pablo Baez is a 76 year old female patient with past medical history of Parkinson's disease, psychotic disorder, hypertension, cervical spondylosis with myelopathy, degenerative joint disease, kidney stones, osteopenia, who resides at assisted living was brought to emergency room for evaluation for multiple falls.   Cognitive Status Examination   Orientation Status orientation to person, place and time   Visual Perception   Visual Impairment/Limitations WFL   Pain Assessment   Patient Currently in Pain Yes, see Vital Sign flowsheet   Range of Motion Comprehensive   General Range of Motion bilateral upper extremity ROM WFL   Comment, General Range of Motion slight tremor    Strength Comprehensive (MMT)   Comment, General Manual Muscle Testing (MMT) Assessment generalized BUE weakness, slight tremor with strength screening    Coordination   Coordination " Comments WFL for fine motor ADL   Activities of Daily Living   BADL Assessment lower body dressing   Lower Body Dressing Assessment   [place] Level (Lower Body Dressing) dependent (less than 25% patient effort)   Comment (Lower Body Dressing) donning socks in sitting    Clinical Impression   Criteria for Skilled Therapeutic Interventions Met (OT) yes;meets criteria;skilled treatment is necessary   OT Diagnosis decreased function in ADL and increased falls    OT Problem List-Impairments impacting ADL problems related to;activity tolerance impaired;balance;mobility   Assessment of Occupational Performance 1-3 Performance Deficits   Identified Performance Deficits bathing, dressing, home mgmt, med mgmt    Planned Therapy Interventions (OT) ADL retraining;IADL retraining;balance training;ROM;home program guidelines;progressive activity/exercise   Clinical Decision Making Complexity (OT) low complexity   Therapy Frequency (OT) Daily   Predicted Duration of Therapy 2-3 days    Anticipated Equipment Needs Upon Discharge (OT)   (pt has all needed )   Risk & Benefits of therapy have been explained evaluation/treatment results reviewed;care plan/treatment goals reviewed;risks/benefits reviewed;current/potential barriers reviewed;participants voiced agreement with care plan;participants included;patient   OT Discharge Planning    OT Discharge Recommendation (DC Rec) home with home care occupational therapy   OT Rationale for DC Rec pt has adequate support for home discharge at [facility]. recommend that for the first 1-3 days pt has assistance for all OOB ADL activty and HH OT  to increase safety in the home and function in ADL. it would take taking effort in order for her to leave the home    Total Evaluation Time (Minutes)   Total Evaluation Time (Minutes) 8

## 2021-09-23 NOTE — PROGRESS NOTES
[facility]  Hospitalist Progress Note  Jalil Mayes MD, MD 09/23/2021  (Text Page)  Reason for Stay (Diagnosis): Falls, multiple rib fracture, left occipital scalp laceration         Assessment and Plan:      Summary of Stay: Pablo Baez is a 76 year old female with history of longstanding Parkinson's disease, hypertension, cervical spondylosis with myelopathy, DJD, kidney stones, osteopenia and reportedly lives in a assisted living set up and was brought in here earlier to the emergency room for multiple falls.        Problem List:   1. Multiple falls  2. Multiple rib fracture left 8, 9, 10 ribs with mild displacement  3. Parkinson's disease  4. Head injury sustaining a left occipital scalp laceration repaired in the emergency room  5. Hypertension  6. Physical deconditioning    Continue current care.  Optimize pain control.  Fortunately not complaining of significant pain.  Not requiring any oxygen support.  Aggressive pulmonary toileting with frequent incentive spirometer.  Her home regimen for Parkinson's with the Sinemet has been resumed, and hypertension with losartan resumed as well.  Her home regimen of Seroquel at bedtime resumed  Requested formal general surgery evaluation given trauma injury that led to multiple rib fractures  No evidence of a pneumothorax as seen on earlier imaging  PT, OT, social service evaluation      DVT Prophylaxis: Pneumatic Compression Devices  Code Status: Full Code  Discharge Dispo: Determined  Estimated Disch Date / # of Days until Disch: Likely will be a good candidate in the next 24 to 36 hours if continues to participate with therapy services, demonstrating no complications from this recent rib fractures, pain is under control        Interval History (Subjective):      I assume service care today for this a very pleasant 76-year-old  lady who came in last night.  I found her sitting comfortably in the chair and just finished her lunch  "rafaely.  She denies any ongoing worsening chest pain no reported nausea or vomiting.  No episodes of being agitated or combative.  Continues to demonstrate stable hemodynamics currently afebrile and not hypoxic and not requiring any oxygen support.                Physical Exam:      Last Vital Signs:  /52 (BP Location: Left arm)   Pulse 71   Temp 98.2  F (36.8  C) (Temporal)   Resp 18   Ht 1.549 m (5' 1\")   Wt 52.9 kg (116 lb 9.6 oz)   SpO2 96%   BMI 22.03 kg/m      No intake/output data recorded.  Wt Readings from Last 1 Encounters:   09/22/21 52.9 kg (116 lb 9.6 oz)     Vitals:    09/22/21 2230   Weight: 52.9 kg (116 lb 9.6 oz)       Constitutional: Awake, alert, cooperative, no apparent distress   Respiratory: Clear to auscultation bilaterally, no crackles or wheezing   Cardiovascular: Regular rate and rhythm, normal S1 and S2, and no murmur noted   Abdomen: Normal bowel sounds, soft, non-distended, non-tender   Skin: No rashes, no cyanosis, dry to touch   Neuro: Alert and oriented x3, no weakness, spontaneous and coherent speech   Extremities: No edema, normal range of motion   Other(s): Euthymic mood, not agitated    Tender upon palpation in the left side of her chest   All other systems: Negative          Medications:      All current medications were reviewed with changes reflected in problem list.         Data:      All new lab and imaging data was reviewed.   Labs:  No results for input(s): CULT in the last 168 hours.  Recent Labs   Lab 09/23/21  0720 09/22/21  1445   WBC 6.7 8.3   HGB 12.6 13.7   HCT 39.5 43.6   * 105*    356     Recent Labs   Lab 09/23/21  0720 09/22/21  1445    140   POTASSIUM 3.8 3.7   CHLORIDE 107 105   CO2 28 30   ANIONGAP 5 5   GLC 99 140*   BUN 14 13   CR 0.68 0.57   GFRESTIMATED 86 >90   GIOVANI 9.1 9.2     No results for input(s): SED, CRP in the last 168 hours.  Recent Labs   Lab 09/23/21  0720 09/22/21  1445   GLC 99 140*     No results for input(s): " INR in the last 168 hours.  No results for input(s): TROPONIN, TROPI, TROPR in the last 168 hours.    Invalid input(s): TROP, TROPONINIES  Recent Labs   Lab 09/22/21  1626   COLOR Light Yellow   APPEARANCE Clear   URINEGLC Negative   URINEBILI Negative   URINEKETONE 10 *   SG 1.012   UBLD Negative   URINEPH 6.0   PROTEIN Negative   NITRITE Negative   LEUKEST Negative   RBCU 1   WBCU 1      Imaging:   Results for orders placed or performed during the hospital encounter of 09/22/21   CT Head w/o Contrast    Narrative    CT OF THE HEAD AND CERVICAL SPINE WITHOUT CONTRAST   9/22/2021 4:30 PM     COMPARISON: None    HISTORY:  Traumatic head and cervical spine injury     TECHNIQUE:   HEAD CT: Axial CT images of the head from the skull base to the vertex  were acquired without IV contrast.  CERVICAL SPINE CT: Axial images of the cervical spine were acquired  without intravenous contrast. Multiplanar reformations were created.      FINDINGS:   HEAD CT:  INTRACRANIAL CONTENTS: No intracranial hemorrhage, extraaxial  collection, or mass effect.  No CT evidence of acute infarct. Mild  presumed chronic small vessel ischemic change. Mild generalized volume  loss.    VISUALIZED ORBITS/SINUSES/MASTOIDS: No significant orbital  abnormality. No significant paranasal sinus mucosal disease. No  significant middle ear or mastoid effusion.    OSSEOUS STRUCTURES/SOFT TISSUES: No significant abnormality.    CERVICAL SPINE CT:  VERTEBRA: Normal vertebral body heights. Solid C3-C6 interbody fusion.  Associated C4 corpectomy. The associated ventral fusion plate is  anchored at C3, C5 and C6. No instrumentation loosening or failure. No  fracture or posttraumatic subluxation.    CANAL/FORAMINA: Chronic degenerative change results in moderate  bilateral C2-3 and moderate bilateral C6-7 neural foraminal stenoses.  No high-grade spinal canal stenosis.    PARASPINAL: No extraspinal abnormality. Visualized lung fields are  clear.      Impression     IMPRESSION:  HEAD CT:  1.  No acute abnormality.  2.  Mild generalized volume loss and presumed chronic small vessel  ischemic change.    CERVICAL SPINE CT:  1.  No CT evidence for acute fracture or post traumatic subluxation.  2.  Solid C3-C6 interbody fusion. Associated corpectomy.  3.  No instrumentation loosening or failure.  4.  Chronic degenerative change results in moderate bilateral C2-3 and  C6-7 neural foraminal stenoses.    Radiation dose for this scan was reduced using automated exposure  control, adjustment of the mA and/or kV according to patient size, or  iterative reconstruction technique    YEFRI ROD MD         SYSTEM ID:  [id number]   Cervical spine CT w/o contrast    Narrative    CT OF THE HEAD AND CERVICAL SPINE WITHOUT CONTRAST   9/22/2021 4:30 PM     COMPARISON: None    HISTORY:  Traumatic head and cervical spine injury     TECHNIQUE:   HEAD CT: Axial CT images of the head from the skull base to the vertex  were acquired without IV contrast.  CERVICAL SPINE CT: Axial images of the cervical spine were acquired  without intravenous contrast. Multiplanar reformations were created.      FINDINGS:   HEAD CT:  INTRACRANIAL CONTENTS: No intracranial hemorrhage, extraaxial  collection, or mass effect.  No CT evidence of acute infarct. Mild  presumed chronic small vessel ischemic change. Mild generalized volume  loss.    VISUALIZED ORBITS/SINUSES/MASTOIDS: No significant orbital  abnormality. No significant paranasal sinus mucosal disease. No  significant middle ear or mastoid effusion.    OSSEOUS STRUCTURES/SOFT TISSUES: No significant abnormality.    CERVICAL SPINE CT:  VERTEBRA: Normal vertebral body heights. Solid C3-C6 interbody fusion.  Associated C4 corpectomy. The associated ventral fusion plate is  anchored at C3, C5 and C6. No instrumentation loosening or failure. No  fracture or posttraumatic subluxation.    CANAL/FORAMINA: Chronic degenerative change results in moderate  bilateral C2-3  and moderate bilateral C6-7 neural foraminal stenoses.  No high-grade spinal canal stenosis.    PARASPINAL: No extraspinal abnormality. Visualized lung fields are  clear.      Impression    IMPRESSION:  HEAD CT:  1.  No acute abnormality.  2.  Mild generalized volume loss and presumed chronic small vessel  ischemic change.    CERVICAL SPINE CT:  1.  No CT evidence for acute fracture or post traumatic subluxation.  2.  Solid C3-C6 interbody fusion. Associated corpectomy.  3.  No instrumentation loosening or failure.  4.  Chronic degenerative change results in moderate bilateral C2-3 and  C6-7 neural foraminal stenoses.    Radiation dose for this scan was reduced using automated exposure  control, adjustment of the mA and/or kV according to patient size, or  iterative reconstruction technique    YEFRI ROD MD         SYSTEM ID:  [id number]   Chest CT w/o contrast    Narrative    CT CHEST WITHOUT CONTRAST  9/22/2021 4:31 PM    HISTORY:  Trauma, chest injury. Left rib tenderness.    TECHNIQUE:  Scans obtained from the apices through the diaphragm  without IV contrast. Radiation dose for this scan was reduced using  automated exposure control, adjustment of the mA and/or kV according  to patient size, or iterative reconstruction technique.    COMPARISON:  None available    FINDINGS:   Chest/mediastinum: Cardiomegaly. No significant pericardial effusion.  No significant mediastinal, hilar or axillary lymphadenopathy.    Lung/pleura: Trace left pleural effusion. No significant right pleural  effusion. No significant pneumothorax. Bibasilar pulmonary opacities,  likely atelectasis. Scattered areas of mucoid impaction, for example  in the right upper lobe (series 4 image 96), can be seen with  respiratory bronchiolitis. Few scattered pulmonary nodules including 5  mm left upper lobe nodule (series 4 image 125)    Upper abdomen: Limited evaluation of the upper abdomen due to lack of  coverage and contrast.    Bones and  soft tissue: Multilevel degenerative changes of the spine.  Heterogenous sclerotic appearance of L5 vertebra, indeterminant, could  be posttraumatic or metastatic. Minimally displaced fractures of the  posterior aspect of the left eighth (series 4 image 138), ninth  (series 4 image 165) and 10th ribs (series 4 image 234). Postsurgical  changes of the lower cervical spine.      Impression    IMPRESSION:    1. Mildly displaced fractures of the posterior aspect of the left  eighth, ninth and 10th ribs.  2. Heterogenous sclerotic appearance of T5 vertebral body,  indeterminate, could be posttraumatic or related to sclerotic  metastasis. This can be further evaluated with thoracic spine MRI.  3. Trace left pleural effusion. No significant right pleural effusion.  No significant pneumothorax. Few scattered pulmonary nodules including  5 mm left upper lobe nodule, as per Fleischner's society criteria, for  low risk patient no routine follow-up is recommended, and for  high-risk patient, optional chest CT in 12 months can be considered.     KANNAN BRITO MD         SYSTEM ID:  [id number]

## 2021-09-23 NOTE — PLAN OF CARE
Pt A&O but forgetful and confused at times.  Laceration to L head staples in place. L ribs fractures 8-10, abraison and bruise to her back. VSS. RA afebrile. Tolerated regular diet. Denies SOB, chest pain and N/V. Encouraged IS use. Pt up with Assist of 1 gait belt and walker.  Page to MD for prn Tylenol for pain. Gabapentin at hs scheduled. Pt said she has nb in her bilateral toes. Compression stockings on. Pt plans to discharge to TCU 1-2 days. Will continue to monitor.

## 2021-09-24 ENCOUNTER — APPOINTMENT (OUTPATIENT)
Dept: OCCUPATIONAL THERAPY | Facility: CLINIC | Age: 76
DRG: 185 | End: 2021-09-24
Payer: MEDICARE

## 2021-09-24 VITALS
RESPIRATION RATE: 16 BRPM | HEART RATE: 67 BPM | SYSTOLIC BLOOD PRESSURE: 131 MMHG | BODY MASS INDEX: 22.01 KG/M2 | OXYGEN SATURATION: 97 % | TEMPERATURE: 97.2 F | HEIGHT: 61 IN | WEIGHT: 116.6 LBS | DIASTOLIC BLOOD PRESSURE: 50 MMHG

## 2021-09-24 PROCEDURE — 250N000013 HC RX MED GY IP 250 OP 250 PS 637: Performed by: INTERNAL MEDICINE

## 2021-09-24 PROCEDURE — 250N000013 HC RX MED GY IP 250 OP 250 PS 637: Performed by: HOSPITALIST

## 2021-09-24 PROCEDURE — 99231 SBSQ HOSP IP/OBS SF/LOW 25: CPT | Performed by: PHYSICIAN ASSISTANT

## 2021-09-24 PROCEDURE — 97535 SELF CARE MNGMENT TRAINING: CPT | Mod: GO | Performed by: REHABILITATION PRACTITIONER

## 2021-09-24 PROCEDURE — 99239 HOSP IP/OBS DSCHRG MGMT >30: CPT | Performed by: INTERNAL MEDICINE

## 2021-09-24 RX ADMIN — OMEPRAZOLE 20 MG: 20 CAPSULE, DELAYED RELEASE ORAL at 08:34

## 2021-09-24 RX ADMIN — Medication 3 HALF-TAB: at 12:06

## 2021-09-24 RX ADMIN — ACETAMINOPHEN 650 MG: 325 TABLET, FILM COATED ORAL at 10:56

## 2021-09-24 RX ADMIN — ACETAMINOPHEN 650 MG: 325 TABLET, FILM COATED ORAL at 05:47

## 2021-09-24 RX ADMIN — CARBIDOPA AND LEVODOPA 2 TABLET: 25; 100 TABLET ORAL at 08:34

## 2021-09-24 RX ADMIN — Medication 2000 UNITS: at 08:34

## 2021-09-24 RX ADMIN — GLYCOPYRROLATE 1 MG: 1 TABLET ORAL at 08:34

## 2021-09-24 RX ADMIN — NITROFURANTOIN (MONOHYDRATE/MACROCRYSTALS) 100 MG: 75; 25 CAPSULE ORAL at 08:34

## 2021-09-24 RX ADMIN — LOSARTAN POTASSIUM 25 MG: 25 TABLET, FILM COATED ORAL at 08:34

## 2021-09-24 ASSESSMENT — ACTIVITIES OF DAILY LIVING (ADL)
ADLS_ACUITY_SCORE: 11
ADLS_ACUITY_SCORE: 11
ADLS_ACUITY_SCORE: 13
ADLS_ACUITY_SCORE: 9

## 2021-09-24 NOTE — PROGRESS NOTES
"[facility]  General Surgery Progress Note           Assessment and Plan:   Assessment:   Fall with left-sided rib fractures (8/9/10, mildly displaced), no pneumothorax  Scalp laceration, stapled closure  Pain management/pulmonary toilet      Plan:   -Pain management: multimodal: acetaminophen, neurontin, robaxin, ice/heat prn  -IS hourly, using well  -Will sign off, contact if needed.         Interval History:   Comfortable up in chair.  Feeling no pain currently, or with breathing.  Only when having to get up from chair, pressure/use of left arm.  Soreness in left upper back and left side.           Physical Exam:   Blood pressure 131/50, pulse 67, temperature 97.2  F (36.2  C), temperature source Temporal, resp. rate 16, height 1.549 m (5' 1\"), weight 52.9 kg (116 lb 9.6 oz), SpO2 97 %.    I/O last 3 completed shifts:  In: 940 [P.O.:940]  Out: 1300 [Urine:1300]    Constitutional:   awake, alert, cooperative, no apparent distress, and appears stated age     Lungs:   No increased work of breathing, good air exchange, clear to auscultation bilaterally, no crackles or wheezing     Abdomen:   Normal bowel sounds, soft, non-distended, non-tender     Few scabbed abrasions at left back with tenderness, faint yellowish bruising at lower site (area of rib fractures), no palpable hematoma.          Data:     Recent Labs   Lab 09/23/21  0720 09/22/21  1445   WBC 6.7 8.3   HGB 12.6 13.7   HCT 39.5 43.6   * 105*    356     Recent Labs   Lab 09/23/21  0720 09/22/21  1445    140   POTASSIUM 3.8 3.7   CHLORIDE 107 105   CO2 28 30   ANIONGAP 5 5   GLC 99 140*   BUN 14 13   CR 0.68 0.57   GFRESTIMATED 86 >90   GIOVANI 9.1 9.2     9/22 CT CHEST WITHOUT CONTRAST                                                       IMPRESSION:    1. Mildly displaced fractures of the posterior aspect of the left  eighth, ninth and 10th ribs.  2. Heterogenous sclerotic appearance of T5 vertebral body,  indeterminate, could " be posttraumatic or related to sclerotic  metastasis. This can be further evaluated with thoracic spine MRI.  3. Trace left pleural effusion. No significant right pleural effusion.  No significant pneumothorax. Few scattered pulmonary nodules including  5 mm left upper lobe nodule, as per Fleischner's society criteria, for  low risk patient no routine follow-up is recommended, and for  high-risk patient, optional chest CT in 12 months can be considered.     9/22 CT OF THE HEAD AND CERVICAL SPINE WITHOUT CONTRAST   IMPRESSION:  HEAD CT:  1.  No acute abnormality.  2.  Mild generalized volume loss and presumed chronic small vessel  ischemic change.  CERVICAL SPINE CT:  1.  No CT evidence for acute fracture or post traumatic subluxation.  2.  Solid C3-C6 interbody fusion. Associated corpectomy.  3.  No instrumentation loosening or failure.  4.  Chronic degenerative change results in moderate bilateral C2-3 and  C6-7 neural foraminal stenoses.    Marie Serna PA-C     Agree with above, seems to be doing well.  Please call with any questions/concerns.  [phone]

## 2021-09-24 NOTE — PLAN OF CARE
Physical Therapy Discharge Summary    Reason for therapy discharge:    Discharged to home.    Progress towards therapy goal(s). See goals on Care Plan in [facility] electronic health record for goal details.  Goals met    Therapy recommendation(s):    No further therapy is recommended.

## 2021-09-24 NOTE — PROGRESS NOTES
Care Management Discharge Note    Discharge Date: 09/24/2021       Discharge Disposition: [facility]     Discharge Services:  Home Care    Discharge DME:      Discharge Transportation:      Private pay costs discussed: Not applicable    PAS Confirmation Code:    Patient/family educated on Medicare website which has current facility and service quality ratings:      Education Provided on the Discharge Plan:    Persons Notified of Discharge Plans: [facility]  Patient/Family in Agreement with the Plan:      Handoff Referral Completed: No    Additional Information:  Pt discharging today. [facility] was notified. Pt also has home care orders, [facility] ask's for those orders to be sent to [facility], this is also pts choice of agency as well. Orders have been faxed to [facility] [phone] and to [facility].    Leesa Brumfield RN, BSN CTS  Care Coordinator  [facility]   [phone]

## 2021-09-24 NOTE — PLAN OF CARE
Pt alert but confused at times. VSS. Pain with movement, managed with PRN Tylenol. IV SL. Baseline numbness to toes. Purewick in place overnight due to urinary urgency. Voided with no difficulties this AM. On Macrobid for UTI. Plan to discharge to TCU when medically stable.

## 2021-09-24 NOTE — PROGRESS NOTES
Patient feels well, wants to d/c today.  Pain controlled with tylenol.  Breathing well and taking good po intake.  Therapy feels she can discharge back to her assisted setting.  Will plan home care with PT/OT and RN.  RN to assist with removing scalp staples next week.  Updated patients daughter at patient request.  Full d/c summary to follow.

## 2021-09-24 NOTE — PROGRESS NOTES
Discharge instructions reviewed with pt. Verbalizes understanding.  Personal belongings gathered along with incentive spirometer for home.  Pt to discharge back to assisted living facility [facility] with home care services.  Awaiting ride from family.

## 2021-09-24 NOTE — PLAN OF CARE
"/66   Pulse 77   Temp 98  F (36.7  C) (Temporal)   Resp 18   Ht 1.549 m (5' 1\")   Wt 52.9 kg (116 lb 9.6 oz)   SpO2 93%   BMI 22.03 kg/m    Orientation: A&O with intermittent confusion  Resp: LS Clear, RA, encouraged IS  Skin: head laceration with staples  Pain: Managed with Tylenol  CMS: Intact, baseline Numbness to bilateral toes.  Activity: A1, walker/gb  Diet: tolerated regular  Voiding: spontaneously without difficulty, pt was concerned that she wasn't getting her antibiotic, but couldn't give me details of when and what was started, I called the [facility] assisted living facility and they told me she started Macrobid on 9/21 1 cap twice daily for seven days for her UTI, pt felt she wanted to continue this even though at the moment she is asymptomatic she feels its important to continue the course, MD paged and orders were placed.  Discharge Plan: Back to her AL     "

## 2021-09-25 NOTE — PLAN OF CARE
Occupational Therapy Discharge Summary    Reason for therapy discharge:    Discharged to home.    Progress towards therapy goal(s). See goals on Care Plan in [facility] electronic health record for goal details.  Goals met    Therapy recommendation(s):    No further therapy is recommended.

## 2021-09-26 NOTE — DISCHARGE SUMMARY
[facility]  Discharge Summary  Hospitalist    Date of Admission:  9/22/2021  Date of Discharge:  9/24/2021  4:01 PM  Provider:  Malachi Gonzalez DO, [facility]    Discharge Diagnoses   1.  Recent mechanical falls  2.  Multiple left rib fractures from fall  3.  Scalp laceration related to fall s/p staples  4.  Recent concern of UTI  5.  Few small incidentally noted pulmonary nodules    Other medical issues:  Left displaced femoral neck fracture  Parkinson  Hypertension  Basal cell carcinoma  Cervical spondylosis with myelopathy  Spondylolisthesis of cervical region  Calculus of kidney  DJD of cervical spine  Presbyopio  Myopia  Hypermetropia  Osteopenia       History of Present Illness   Pablo Baez is an 76 year old female who presented after recent falls with multiple rib fractures and a scalp laceration.  Please see the admission history and physical for full details.    Hospital Course   Pablo Baez was admitted on 9/22/2021.  The following problems were addressed during her hospitalization:    Ms. Baez had multiple recent falls.  These appear mechanical by history.  She has had some rib pain after falls and it is unclear when precisely she sustained her rib fractures but was noted to have multiple left sided ones.  She also presented with an acute scalp laceration from her most recent fall.  This was stapled closed in the ED and needs staple removal in another week.    Given the fall/injuries she was also seen by trauma surgery.  She did not have a pneumothorax.  No other major injuries were noted.  She was doing well from a respiratory and pain standpoint toward discharge.  Incentive spirometry was encouraged to continue at discharge for at least several more days.  She was moving around better and pain was controlled. The patient strongly desired discharge and felt comfortable with the plan.  Home care was setup for discharge.  Patients daughter was also updated at patients  "request.    Other issues:  Patient recently had a diagnosed UTI before this admission.  She had a UA early in this stay and her urine appeared quite clear.  She denied any major urinary complaints toward discharge.  Given the UA result and patient feeling well ongoing UTI antibiotics were not continued.  Patient also had a few small incidentally noted pulmonary nodules on imaging.  I will defer to patients PCP consideration of any long term imaging/monitoring of these.      Significant Results and Procedures   See below    Pending Results      Unresulted Labs Ordered in the Past 30 Days of this Admission     No orders found from 8/23/2021 to 9/23/2021.          Code Status   Full Code       Primary Care Physician   Uvaldo Sterling    Blood pressure 131/50, pulse 67, temperature 97.2  F (36.2  C), temperature source Temporal, resp. rate 16, height 1.549 m (5' 1\"), weight 52.9 kg (116 lb 9.6 oz), SpO2 97 %.    Alert, oriented, heart regular, lungs with good air movement/no wheeze.    Discharge Disposition   Discharged to home    Consultations This Hospital Stay   THORACIC SURGERY IP CONSULT  PHYSICAL THERAPY ADULT IP CONSULT  OCCUPATIONAL THERAPY ADULT IP CONSULT  CARE MANAGEMENT / SOCIAL WORK IP CONSULT  SURGERY GENERAL IP CONSULT    Time Spent on this Encounter   IMalachi DO, personally saw the patient today and spent greater than 30 minutes discharging this patient.    Discharge Orders      Home care nursing referral      Home Care PT Referral for Hospital Discharge      Home Care OT Referral for Hospital Discharge      Reason for your hospital stay    Fall with rib fractures, scalp laceration     Follow-up and recommended labs and tests     1.  Follow-up primary provider 1-2 weeks  2.  Home care RN/or primary to remove scalp staples around 10/1/21 as can be arranged  3.  Follow-up with neurology as you already have scheduled     Activity    Your activity upon discharge: activity as tolerated "     MD face to face encounter    Documentation of Face to Face and Certification for Home Health Services    I certify that patient: Pablo Baez is under my care and that I, or a nurse practitioner or physician's assistant working with me, had a face-to-face encounter that meets the physician face-to-face encounter requirements with this patient on: 9/24/2021.    This encounter with the patient was in whole, or in part, for the following medical condition, which is the primary reason for home health care: Fall, rib fractures/scalp laceration.    I certify that, based on my findings, the following services are medically necessary home health services: Nursing, Occupational Therapy, and Physical Therapy.    My clinical findings support the need for the above services because: Nurse is needed: staples/laceration care., Occupational Therapy Services are needed to assess and treat cognitive ability and address ADL safety due to impairment in mobility/deconditioning/home safety., and Physical Therapy Services are needed to assess and treat the following functional impairments: deconditioning/mobility/home safety.    Further, I certify that my clinical findings support that this patient is homebound (i.e. absences from home require considerable and taxing effort and are for medical reasons or [employer] services or infrequently or of short duration when for other reasons) because: Leaving home is medically contraindicated for the following reason(s): challenging mobility with neuro issues and recent falls/fractures..    Based on the above findings. I certify that this patient is confined to the home and needs intermittent skilled nursing care, physical therapy and/or speech therapy.  The patient is under my care, and I have initiated the establishment of the plan of care.  This patient will be followed by a physician who will periodically review the plan of care.  Physician/Provider to provide follow up care: Asher  Uvaldo JOHNSON    Attending hospital physician (the Medicare certified [facility] provider): Malachi Gonzalez, , Replaced by [facility]  Physician Signature: See electronic signature associated with these discharge orders.  Date: 9/24/2021     Brief Discharge Instructions    Continue to use incentive spirometer as you have been in the hospital for at least another week     When to contact your care team    Call if questions.  Notify provider if fevers, worsening bladder/urine complaints, new falls, worsening pain, worsening shortness of breath, new bleeding/discharge from scalp staple site, other new medical concerns.     Diet    Follow this diet upon discharge: Regular diet     Discharge Medications   Current Discharge Medication List      CONTINUE these medications which have NOT CHANGED    Details   acetaminophen (TYLENOL) 500 MG tablet Take 1,000 mg by mouth 3 times daily      carbidopa-levodopa (SINEMET CR)  MG CR tablet Take 1 tablet by mouth daily at 11pm.  Indications: Parkinson's Disease      carbidopa-levodopa (SINEMET)  MG tablet TAKE 2 TABS BY MOUTH @ 7:30AM, 1 & 1/2 TAB @ 12PM, 2 TABS @ 4:30PM, & THEN 2 TABS @ 9PM (PARKINSON)      cholecalciferol 50 MCG (2000 UT) tablet Take 2,000 Units by mouth daily      gabapentin (NEURONTIN) 100 MG capsule Take 100 mg by mouth At Bedtime      glycopyrrolate (ROBINUL) 1 MG tablet Take 1 tablet by mouth every morning      ketoconazole (NIZORAL) 2 % external shampoo APPLY TOPICALLY EVERY OTHER DAY. INDICATIONS  TINEA VERSICOLOR      losartan (COZAAR) 25 MG tablet Take 25 mg by mouth daily      methocarbamol (ROBAXIN) 500 MG tablet Take 1 tablet by mouth 2 times daily as needed      neomycin-polymixin-dexamethasone (MAXITROL) ophthalmic ointment Place 0.25 inches into both eyes At Bedtime      omeprazole (PRILOSEC) 20 MG DR capsule Take 20 mg by mouth daily      Pediatric Multivit-Minerals-C (GUMMI BEAR MULTIVITAMIN/MIN PO) Take 2 tablets by mouth every morning      polyvinyl  alcohol (LIQUIFILM TEARS) 1.4 % ophthalmic solution Place 1 drop into both eyes every 4 hours as needed      QUEtiapine (SEROQUEL) 25 MG tablet Take 1 tablet by mouth At Bedtime         STOP taking these medications       nitroFURantoin macrocrystal-monohydrate (MACROBID) 100 MG capsule Comments:   Reason for Stopping:               Allergies   No Known Allergies  Data   Recent Labs   Lab 09/23/21  0720 09/22/21  1445   WBC 6.7 8.3   HGB 12.6 13.7   HCT 39.5 43.6   * 105*    356     Recent Labs   Lab 09/23/21  0720 09/22/21  1445    140   POTASSIUM 3.8 3.7   CHLORIDE 107 105   CO2 28 30   ANIONGAP 5 5   GLC 99 140*   BUN 14 13   CR 0.68 0.57   GFRESTIMATED 86 >90   GIOVANI 9.1 9.2       Recent Labs   Lab 09/22/21  1626   COLOR Light Yellow   APPEARANCE Clear   URINEGLC Negative   URINEBILI Negative   URINEKETONE 10 *   SG 1.012   UBLD Negative   URINEPH 6.0   PROTEIN Negative   NITRITE Negative   LEUKEST Negative   RBCU 1   WBCU 1     Results for orders placed or performed during the hospital encounter of 09/22/21   CT Head w/o Contrast    Narrative    CT OF THE HEAD AND CERVICAL SPINE WITHOUT CONTRAST   9/22/2021 4:30 PM     COMPARISON: None    HISTORY:  Traumatic head and cervical spine injury     TECHNIQUE:   HEAD CT: Axial CT images of the head from the skull base to the vertex  were acquired without IV contrast.  CERVICAL SPINE CT: Axial images of the cervical spine were acquired  without intravenous contrast. Multiplanar reformations were created.      FINDINGS:   HEAD CT:  INTRACRANIAL CONTENTS: No intracranial hemorrhage, extraaxial  collection, or mass effect.  No CT evidence of acute infarct. Mild  presumed chronic small vessel ischemic change. Mild generalized volume  loss.    VISUALIZED ORBITS/SINUSES/MASTOIDS: No significant orbital  abnormality. No significant paranasal sinus mucosal disease. No  significant middle ear or mastoid effusion.    OSSEOUS STRUCTURES/SOFT TISSUES: No  significant abnormality.    CERVICAL SPINE CT:  VERTEBRA: Normal vertebral body heights. Solid C3-C6 interbody fusion.  Associated C4 corpectomy. The associated ventral fusion plate is  anchored at C3, C5 and C6. No instrumentation loosening or failure. No  fracture or posttraumatic subluxation.    CANAL/FORAMINA: Chronic degenerative change results in moderate  bilateral C2-3 and moderate bilateral C6-7 neural foraminal stenoses.  No high-grade spinal canal stenosis.    PARASPINAL: No extraspinal abnormality. Visualized lung fields are  clear.      Impression    IMPRESSION:  HEAD CT:  1.  No acute abnormality.  2.  Mild generalized volume loss and presumed chronic small vessel  ischemic change.    CERVICAL SPINE CT:  1.  No CT evidence for acute fracture or post traumatic subluxation.  2.  Solid C3-C6 interbody fusion. Associated corpectomy.  3.  No instrumentation loosening or failure.  4.  Chronic degenerative change results in moderate bilateral C2-3 and  C6-7 neural foraminal stenoses.    Radiation dose for this scan was reduced using automated exposure  control, adjustment of the mA and/or kV according to patient size, or  iterative reconstruction technique    YEFRI ROD MD         SYSTEM ID:  [id number]   Cervical spine CT w/o contrast    Narrative    CT OF THE HEAD AND CERVICAL SPINE WITHOUT CONTRAST   9/22/2021 4:30 PM     COMPARISON: None    HISTORY:  Traumatic head and cervical spine injury     TECHNIQUE:   HEAD CT: Axial CT images of the head from the skull base to the vertex  were acquired without IV contrast.  CERVICAL SPINE CT: Axial images of the cervical spine were acquired  without intravenous contrast. Multiplanar reformations were created.      FINDINGS:   HEAD CT:  INTRACRANIAL CONTENTS: No intracranial hemorrhage, extraaxial  collection, or mass effect.  No CT evidence of acute infarct. Mild  presumed chronic small vessel ischemic change. Mild generalized volume  loss.    VISUALIZED  ORBITS/SINUSES/MASTOIDS: No significant orbital  abnormality. No significant paranasal sinus mucosal disease. No  significant middle ear or mastoid effusion.    OSSEOUS STRUCTURES/SOFT TISSUES: No significant abnormality.    CERVICAL SPINE CT:  VERTEBRA: Normal vertebral body heights. Solid C3-C6 interbody fusion.  Associated C4 corpectomy. The associated ventral fusion plate is  anchored at C3, C5 and C6. No instrumentation loosening or failure. No  fracture or posttraumatic subluxation.    CANAL/FORAMINA: Chronic degenerative change results in moderate  bilateral C2-3 and moderate bilateral C6-7 neural foraminal stenoses.  No high-grade spinal canal stenosis.    PARASPINAL: No extraspinal abnormality. Visualized lung fields are  clear.      Impression    IMPRESSION:  HEAD CT:  1.  No acute abnormality.  2.  Mild generalized volume loss and presumed chronic small vessel  ischemic change.    CERVICAL SPINE CT:  1.  No CT evidence for acute fracture or post traumatic subluxation.  2.  Solid C3-C6 interbody fusion. Associated corpectomy.  3.  No instrumentation loosening or failure.  4.  Chronic degenerative change results in moderate bilateral C2-3 and  C6-7 neural foraminal stenoses.    Radiation dose for this scan was reduced using automated exposure  control, adjustment of the mA and/or kV according to patient size, or  iterative reconstruction technique    YEFRI ROD MD         SYSTEM ID:  [id number]   Chest CT w/o contrast    Narrative    CT CHEST WITHOUT CONTRAST  9/22/2021 4:31 PM    HISTORY:  Trauma, chest injury. Left rib tenderness.    TECHNIQUE:  Scans obtained from the apices through the diaphragm  without IV contrast. Radiation dose for this scan was reduced using  automated exposure control, adjustment of the mA and/or kV according  to patient size, or iterative reconstruction technique.    COMPARISON:  None available    FINDINGS:   Chest/mediastinum: Cardiomegaly. No significant pericardial  effusion.  No significant mediastinal, hilar or axillary lymphadenopathy.    Lung/pleura: Trace left pleural effusion. No significant right pleural  effusion. No significant pneumothorax. Bibasilar pulmonary opacities,  likely atelectasis. Scattered areas of mucoid impaction, for example  in the right upper lobe (series 4 image 96), can be seen with  respiratory bronchiolitis. Few scattered pulmonary nodules including 5  mm left upper lobe nodule (series 4 image 125)    Upper abdomen: Limited evaluation of the upper abdomen due to lack of  coverage and contrast.    Bones and soft tissue: Multilevel degenerative changes of the spine.  Heterogenous sclerotic appearance of L5 vertebra, indeterminant, could  be posttraumatic or metastatic. Minimally displaced fractures of the  posterior aspect of the left eighth (series 4 image 138), ninth  (series 4 image 165) and 10th ribs (series 4 image 234). Postsurgical  changes of the lower cervical spine.      Impression    IMPRESSION:    1. Mildly displaced fractures of the posterior aspect of the left  eighth, ninth and 10th ribs.  2. Heterogenous sclerotic appearance of T5 vertebral body,  indeterminate, could be posttraumatic or related to sclerotic  metastasis. This can be further evaluated with thoracic spine MRI.  3. Trace left pleural effusion. No significant right pleural effusion.  No significant pneumothorax. Few scattered pulmonary nodules including  5 mm left upper lobe nodule, as per Fleischner's society criteria, for  low risk patient no routine follow-up is recommended, and for  high-risk patient, optional chest CT in 12 months can be considered.     KANNAN BRITO MD         SYSTEM ID:  [id number]

## 2021-10-05 ENCOUNTER — LAB REQUISITION (OUTPATIENT)
Dept: LAB | Facility: CLINIC | Age: 76
End: 2021-10-05
Payer: MEDICARE

## 2021-10-05 DIAGNOSIS — R68.89 OTHER GENERAL SYMPTOMS AND SIGNS: ICD-10-CM

## 2021-10-05 DIAGNOSIS — Z13.228 ENCOUNTER FOR SCREENING FOR OTHER METABOLIC DISORDERS: ICD-10-CM

## 2021-10-05 LAB — C DIFF TOX B STL QL: POSITIVE

## 2021-10-05 PROCEDURE — 87493 C DIFF AMPLIFIED PROBE: CPT | Mod: ORL

## 2021-10-06 ENCOUNTER — LAB REQUISITION (OUTPATIENT)
Dept: LAB | Facility: CLINIC | Age: 76
End: 2021-10-06
Payer: MEDICARE

## 2021-10-06 LAB
ANION GAP SERPL CALCULATED.3IONS-SCNC: 8 MMOL/L (ref 5–18)
BUN SERPL-MCNC: 10 MG/DL (ref 8–28)
C DIFF TOX B STL QL: POSITIVE
CALCIUM SERPL-MCNC: 8.4 MG/DL (ref 8.5–10.5)
CHLORIDE BLD-SCNC: 103 MMOL/L (ref 98–107)
CO2 SERPL-SCNC: 26 MMOL/L (ref 22–31)
CREAT SERPL-MCNC: 0.63 MG/DL (ref 0.6–1.1)
ERYTHROCYTE [DISTWIDTH] IN BLOOD BY AUTOMATED COUNT: 13.2 % (ref 10–15)
GFR SERPL CREATININE-BSD FRML MDRD: 88 ML/MIN/1.73M2
GLUCOSE BLD-MCNC: 95 MG/DL (ref 70–125)
HCT VFR BLD AUTO: 31.9 % (ref 35–47)
HGB BLD-MCNC: 10.2 G/DL (ref 11.7–15.7)
MCH RBC QN AUTO: 32.4 PG (ref 26.5–33)
MCHC RBC AUTO-ENTMCNC: 32 G/DL (ref 31.5–36.5)
MCV RBC AUTO: 101 FL (ref 78–100)
PLATELET # BLD AUTO: 545 10E3/UL (ref 150–450)
POTASSIUM BLD-SCNC: 3.9 MMOL/L (ref 3.5–5)
RBC # BLD AUTO: 3.15 10E6/UL (ref 3.8–5.2)
SODIUM SERPL-SCNC: 137 MMOL/L (ref 136–145)
WBC # BLD AUTO: 7.4 10E3/UL (ref 4–11)

## 2021-10-06 PROCEDURE — 87493 C DIFF AMPLIFIED PROBE: CPT | Performed by: FAMILY MEDICINE

## 2021-10-06 PROCEDURE — P9604 ONE-WAY ALLOW PRORATED TRIP: HCPCS | Performed by: FAMILY MEDICINE

## 2021-10-06 PROCEDURE — 85027 COMPLETE CBC AUTOMATED: CPT | Mod: ORL | Performed by: FAMILY MEDICINE

## 2021-10-06 PROCEDURE — 80048 BASIC METABOLIC PNL TOTAL CA: CPT | Performed by: FAMILY MEDICINE

## 2021-10-06 PROCEDURE — 36415 COLL VENOUS BLD VENIPUNCTURE: CPT | Mod: ORL | Performed by: FAMILY MEDICINE

## 2022-01-12 ENCOUNTER — APPOINTMENT (OUTPATIENT)
Dept: GENERAL RADIOLOGY | Facility: CLINIC | Age: 77
End: 2022-01-12
Attending: EMERGENCY MEDICINE
Payer: MEDICARE

## 2022-01-12 ENCOUNTER — APPOINTMENT (OUTPATIENT)
Dept: MRI IMAGING | Facility: CLINIC | Age: 77
End: 2022-01-12
Attending: EMERGENCY MEDICINE
Payer: MEDICARE

## 2022-01-12 ENCOUNTER — APPOINTMENT (OUTPATIENT)
Dept: GENERAL RADIOLOGY | Facility: CLINIC | Age: 77
End: 2022-01-12
Attending: PHYSICIAN ASSISTANT
Payer: MEDICARE

## 2022-01-12 ENCOUNTER — APPOINTMENT (OUTPATIENT)
Dept: CT IMAGING | Facility: CLINIC | Age: 77
End: 2022-01-12
Attending: EMERGENCY MEDICINE
Payer: MEDICARE

## 2022-01-12 ENCOUNTER — HOSPITAL ENCOUNTER (OUTPATIENT)
Facility: CLINIC | Age: 77
Setting detail: OBSERVATION
Discharge: NURSING, REHAB OR LONG-TERM CARE | End: 2022-01-17
Attending: EMERGENCY MEDICINE | Admitting: HOSPITALIST
Payer: MEDICARE

## 2022-01-12 DIAGNOSIS — S22.029A CLOSED FRACTURE OF SECOND THORACIC VERTEBRA, UNSPECIFIED FRACTURE MORPHOLOGY, INITIAL ENCOUNTER (H): ICD-10-CM

## 2022-01-12 DIAGNOSIS — S22.029A CLOSED FRACTURE OF SECOND THORACIC VERTEBRA, UNSPECIFIED FRACTURE MORPHOLOGY, INITIAL ENCOUNTER (H): Primary | ICD-10-CM

## 2022-01-12 DIAGNOSIS — R29.898 LEFT HAND WEAKNESS: ICD-10-CM

## 2022-01-12 LAB
ALBUMIN UR-MCNC: NEGATIVE MG/DL
ANION GAP SERPL CALCULATED.3IONS-SCNC: 5 MMOL/L (ref 3–14)
APPEARANCE UR: CLEAR
BASOPHILS # BLD AUTO: 0 10E3/UL (ref 0–0.2)
BASOPHILS NFR BLD AUTO: 0 %
BILIRUB UR QL STRIP: NEGATIVE
BUN SERPL-MCNC: 12 MG/DL (ref 7–30)
CALCIUM SERPL-MCNC: 9.6 MG/DL (ref 8.5–10.1)
CHLORIDE BLD-SCNC: 108 MMOL/L (ref 94–109)
CK SERPL-CCNC: 107 U/L (ref 30–225)
CO2 SERPL-SCNC: 26 MMOL/L (ref 20–32)
COLOR UR AUTO: ABNORMAL
CREAT SERPL-MCNC: 0.52 MG/DL (ref 0.52–1.04)
EOSINOPHIL # BLD AUTO: 0 10E3/UL (ref 0–0.7)
EOSINOPHIL NFR BLD AUTO: 1 %
ERYTHROCYTE [DISTWIDTH] IN BLOOD BY AUTOMATED COUNT: 11.7 % (ref 10–15)
GFR SERPL CREATININE-BSD FRML MDRD: >90 ML/MIN/1.73M2
GLUCOSE BLD-MCNC: 106 MG/DL (ref 70–99)
GLUCOSE UR STRIP-MCNC: NEGATIVE MG/DL
HCT VFR BLD AUTO: 42.4 % (ref 35–47)
HGB BLD-MCNC: 13.2 G/DL (ref 11.7–15.7)
HGB UR QL STRIP: NEGATIVE
IMM GRANULOCYTES # BLD: 0 10E3/UL
IMM GRANULOCYTES NFR BLD: 0 %
KETONES UR STRIP-MCNC: ABNORMAL MG/DL
LEUKOCYTE ESTERASE UR QL STRIP: NEGATIVE
LYMPHOCYTES # BLD AUTO: 1.1 10E3/UL (ref 0.8–5.3)
LYMPHOCYTES NFR BLD AUTO: 15 %
MCH RBC QN AUTO: 33.8 PG (ref 26.5–33)
MCHC RBC AUTO-ENTMCNC: 31.1 G/DL (ref 31.5–36.5)
MCV RBC AUTO: 108 FL (ref 78–100)
MONOCYTES # BLD AUTO: 0.5 10E3/UL (ref 0–1.3)
MONOCYTES NFR BLD AUTO: 6 %
NEUTROPHILS # BLD AUTO: 5.5 10E3/UL (ref 1.6–8.3)
NEUTROPHILS NFR BLD AUTO: 78 %
NITRATE UR QL: NEGATIVE
NRBC # BLD AUTO: 0 10E3/UL
NRBC BLD AUTO-RTO: 0 /100
PH UR STRIP: 7 [PH] (ref 5–7)
PLATELET # BLD AUTO: 326 10E3/UL (ref 150–450)
POTASSIUM BLD-SCNC: 4.2 MMOL/L (ref 3.4–5.3)
RBC # BLD AUTO: 3.91 10E6/UL (ref 3.8–5.2)
RBC URINE: 2 /HPF
SARS-COV-2 RNA RESP QL NAA+PROBE: NEGATIVE
SODIUM SERPL-SCNC: 139 MMOL/L (ref 133–144)
SP GR UR STRIP: 1.01 (ref 1–1.03)
UROBILINOGEN UR STRIP-MCNC: NORMAL MG/DL
WBC # BLD AUTO: 7.1 10E3/UL (ref 4–11)
WBC URINE: 1 /HPF

## 2022-01-12 PROCEDURE — G1004 CDSM NDSC: HCPCS

## 2022-01-12 PROCEDURE — 82550 ASSAY OF CK (CPK): CPT | Performed by: EMERGENCY MEDICINE

## 2022-01-12 PROCEDURE — G0378 HOSPITAL OBSERVATION PER HR: HCPCS

## 2022-01-12 PROCEDURE — L0200 CERV COL SUPP ADJ BAR & THOR: HCPCS

## 2022-01-12 PROCEDURE — 72146 MRI CHEST SPINE W/O DYE: CPT | Mod: MG

## 2022-01-12 PROCEDURE — 71045 X-RAY EXAM CHEST 1 VIEW: CPT

## 2022-01-12 PROCEDURE — 72125 CT NECK SPINE W/O DYE: CPT | Mod: ME

## 2022-01-12 PROCEDURE — G0463 HOSPITAL OUTPT CLINIC VISIT: HCPCS | Performed by: PHYSICIAN ASSISTANT

## 2022-01-12 PROCEDURE — 73060 X-RAY EXAM OF HUMERUS: CPT | Mod: LT

## 2022-01-12 PROCEDURE — 73502 X-RAY EXAM HIP UNI 2-3 VIEWS: CPT

## 2022-01-12 PROCEDURE — 82310 ASSAY OF CALCIUM: CPT | Performed by: EMERGENCY MEDICINE

## 2022-01-12 PROCEDURE — 36415 COLL VENOUS BLD VENIPUNCTURE: CPT | Performed by: EMERGENCY MEDICINE

## 2022-01-12 PROCEDURE — 85025 COMPLETE CBC W/AUTO DIFF WBC: CPT | Performed by: EMERGENCY MEDICINE

## 2022-01-12 PROCEDURE — 99220 PR INITIAL OBSERVATION CARE,LEVEL III: CPT | Performed by: PHYSICIAN ASSISTANT

## 2022-01-12 PROCEDURE — C9803 HOPD COVID-19 SPEC COLLECT: HCPCS

## 2022-01-12 PROCEDURE — 87635 SARS-COV-2 COVID-19 AMP PRB: CPT | Performed by: EMERGENCY MEDICINE

## 2022-01-12 PROCEDURE — 81001 URINALYSIS AUTO W/SCOPE: CPT | Performed by: EMERGENCY MEDICINE

## 2022-01-12 PROCEDURE — 72128 CT CHEST SPINE W/O DYE: CPT | Mod: MG

## 2022-01-12 PROCEDURE — 72141 MRI NECK SPINE W/O DYE: CPT | Mod: ME

## 2022-01-12 PROCEDURE — 99285 EMERGENCY DEPT VISIT HI MDM: CPT | Mod: 25

## 2022-01-12 RX ORDER — CARBIDOPA AND LEVODOPA 25; 100 MG/1; MG/1
2 TABLET ORAL AT BEDTIME
Status: DISCONTINUED | OUTPATIENT
Start: 2022-01-13 | End: 2022-01-13

## 2022-01-12 RX ORDER — METHOCARBAMOL 500 MG/1
500 TABLET, FILM COATED ORAL EVERY 6 HOURS PRN
Status: DISCONTINUED | OUTPATIENT
Start: 2022-01-12 | End: 2022-01-13

## 2022-01-12 RX ORDER — QUETIAPINE FUMARATE 25 MG/1
25 TABLET, FILM COATED ORAL AT BEDTIME
Status: DISCONTINUED | OUTPATIENT
Start: 2022-01-12 | End: 2022-01-13

## 2022-01-12 RX ORDER — IBUPROFEN 400 MG/1
400 TABLET, FILM COATED ORAL EVERY 6 HOURS PRN
Status: DISCONTINUED | OUTPATIENT
Start: 2022-01-12 | End: 2022-01-17 | Stop reason: HOSPADM

## 2022-01-12 RX ORDER — CARBIDOPA AND LEVODOPA 25; 100 MG/1; MG/1
2 TABLET ORAL 2 TIMES DAILY
Status: DISCONTINUED | OUTPATIENT
Start: 2022-01-13 | End: 2022-01-12 | Stop reason: DRUGHIGH

## 2022-01-12 RX ORDER — CARBIDOPA AND LEVODOPA 25; 100 MG/1; MG/1
2 TABLET ORAL 2 TIMES DAILY
Status: DISCONTINUED | OUTPATIENT
Start: 2022-01-13 | End: 2022-01-13

## 2022-01-12 RX ORDER — GABAPENTIN 100 MG/1
100 CAPSULE ORAL AT BEDTIME
Status: DISCONTINUED | OUTPATIENT
Start: 2022-01-12 | End: 2022-01-12

## 2022-01-12 RX ORDER — ACETAMINOPHEN 325 MG/1
975 TABLET ORAL 3 TIMES DAILY
Status: DISCONTINUED | OUTPATIENT
Start: 2022-01-13 | End: 2022-01-17 | Stop reason: HOSPADM

## 2022-01-12 RX ORDER — GLYCOPYRROLATE 1 MG/1
1 TABLET ORAL 2 TIMES DAILY PRN
Status: ON HOLD | COMMUNITY
End: 2022-11-06

## 2022-01-12 RX ORDER — CARBIDOPA AND LEVODOPA 25; 100 MG/1; MG/1
2 TABLET ORAL 2 TIMES DAILY
Status: ON HOLD | COMMUNITY
End: 2022-11-12

## 2022-01-12 RX ORDER — POLYETHYLENE GLYCOL 3350 17 G/17G
17 POWDER, FOR SOLUTION ORAL DAILY PRN
Status: DISCONTINUED | OUTPATIENT
Start: 2022-01-12 | End: 2022-01-17 | Stop reason: HOSPADM

## 2022-01-12 RX ORDER — LORAZEPAM 2 MG/ML
1 INJECTION INTRAMUSCULAR ONCE
Status: DISCONTINUED | OUTPATIENT
Start: 2022-01-12 | End: 2022-01-12

## 2022-01-12 RX ORDER — FAMOTIDINE 20 MG/1
20 TABLET, FILM COATED ORAL DAILY
Status: ON HOLD | COMMUNITY
End: 2022-01-17

## 2022-01-12 RX ORDER — QUETIAPINE FUMARATE 25 MG/1
12.5 TABLET, FILM COATED ORAL DAILY PRN
Status: ON HOLD | COMMUNITY
End: 2022-11-06

## 2022-01-12 RX ORDER — ONDANSETRON 4 MG/1
4 TABLET, ORALLY DISINTEGRATING ORAL EVERY 6 HOURS PRN
Status: DISCONTINUED | OUTPATIENT
Start: 2022-01-12 | End: 2022-01-17 | Stop reason: HOSPADM

## 2022-01-12 RX ORDER — CARBIDOPA AND LEVODOPA 25; 100 MG/1; MG/1
1.5 TABLET ORAL 2 TIMES DAILY
Status: ON HOLD | COMMUNITY
End: 2022-11-12

## 2022-01-12 RX ORDER — SENNOSIDES 8.6 MG
1 TABLET ORAL DAILY
Status: ON HOLD | COMMUNITY
End: 2022-01-17

## 2022-01-12 RX ORDER — AMOXICILLIN 250 MG
2 CAPSULE ORAL 2 TIMES DAILY PRN
Status: DISCONTINUED | OUTPATIENT
Start: 2022-01-12 | End: 2022-01-17 | Stop reason: HOSPADM

## 2022-01-12 RX ORDER — METHOCARBAMOL 500 MG/1
250 TABLET, FILM COATED ORAL 2 TIMES DAILY PRN
Status: ON HOLD | COMMUNITY
End: 2022-01-17

## 2022-01-12 RX ORDER — ONDANSETRON 2 MG/ML
4 INJECTION INTRAMUSCULAR; INTRAVENOUS EVERY 6 HOURS PRN
Status: DISCONTINUED | OUTPATIENT
Start: 2022-01-12 | End: 2022-01-17 | Stop reason: HOSPADM

## 2022-01-12 RX ORDER — AMOXICILLIN 250 MG
1 CAPSULE ORAL 2 TIMES DAILY PRN
Status: DISCONTINUED | OUTPATIENT
Start: 2022-01-12 | End: 2022-01-17 | Stop reason: HOSPADM

## 2022-01-12 ASSESSMENT — ENCOUNTER SYMPTOMS
ARTHRALGIAS: 1
ABDOMINAL PAIN: 0
BACK PAIN: 0

## 2022-01-12 NOTE — ED TRIAGE NOTES
Patient arrives via EMS. Comes from the [facility]. Per staff report, patient was found on the ground this morning. Reports of increased confusion over the last couple of days and increased unsteady gait. Endorses left hip pain and neck stiffness. Hx of parkinson's

## 2022-01-12 NOTE — ED PROVIDER NOTES
History   Chief Complaint:  Fall       HPI   Pablo Baez is a 76 year old female with history of vertigo who presents with a fall yesterday evening from her couch and a fall in the kitchen after which she laid on the floor for an hour and a half. She hit her head but denies lose of consciousness. She denies chest pain, abdominal pain, and back pain but endorses mild hip pain. She has had several falls in the past few weeks.    Review of Systems   Cardiovascular: Negative for chest pain.   Gastrointestinal: Negative for abdominal pain.   Musculoskeletal: Positive for arthralgias. Negative for back pain.   Neurological: Negative for syncope.   All other systems reviewed and are negative.    Allergies:  Lisinopril    Medications:  Sinemet  Gabapentin  Robinul  Cozaar  Prilosec  Seroquel    Past Medical History:     parkinson's disease  Multiple falls    Vertigo  Hypertension  Basal cell carcinoma  Psychotic disorder with delusions  Parkinson's disease  Kidney stones  Osteoarthritis     Past Surgical History:    Hernia repair  [place] tooth repair  Rotator cuff repair  Tubal ligation   C3-5 fusion   Osteopenia     Family History:    Father: cancer, hypertension  Mother: hypertension, stroke, osteoporosis     Social History:  Marital status:    History of tobacco use    Physical Exam     Patient Vitals for the past 24 hrs:   BP Temp Temp src Pulse Resp SpO2   01/12/22 0849 (!) 169/78 98.2  F (36.8  C) Oral 80 16 95 %       Physical Exam  General: Patient is alert, awake and interactive when I enter the room  Head: The scalp, face, and head appear normal. Atraumatic.   Eyes: The pupils are equal, round, and reactive to light. Conjunctivae and sclerae are normal  ENT: No hemotympanum or signs basilar skull fracture. The oropharynx is normal without erythema. No tenderness to palpation of the face, nose or jaw.   Neck: Normal range of motion. No cervical midline tenderness.   CV: Regular rate.  Resp: Lungs  "are clear without wheezes or rales. No distress. No crepitance.   GI: Abdomen is soft, no rigidity, guarding, or rebound. No contusion. No distension. No tenderness to palpation in any quadrant.     MS: mild left hip pain. No asymmetric leg swelling, calf or thigh tenderness. Normal motor assessment of all extremities. PROM performed of all major joints without pain No C/T/L tenderness in midline  Skin: No rash or lesions noted. Normal capillary refill noted  Neuro: decreased strength with dorsioflexion of the left wrist. normal  strength, normal sensation in the left hand. Normal strength at the elbow and sensation in the forearm. GCS 15.  CN\"s II-XII intact. Speech is normal and fluent. Face is symmetric.   Psych: Normal affect.  Appropriate interactions. Fixed Delusions     Emergency Department Course     Imaging:  CT Thoracic Spine w/o Contrast    1. Acute-appearing lucent fracture line through the T2 vertebral body  involving the superior right endplate with slight loss of T2 vertebral  body height. This fracture was also seen on cervical spine CT from the  same day.  2. Fracture of the T5 vertebral body with sclerosis throughout this  vertebral body and loss of approximately 25% vertebral body height.  This may be more subacute in age given its sclerosis.  3. Lucent fracture lines of the posterior spinous processes of T6 and  T7 which are at least moderately displaced. These may be acute or  subacute as no surrounding bony bridging is appreciated.  4. Subacute-appearing fracture of the anterior left fourth rib which  is displaced but has mild surrounding bony bridging.  5. Chronic-appearing fracture deformities of the posterior left eighth  and ninth ribs.  6. Degenerative changes in the thoracic spine as detailed above.    XR Pelvis and Hip Left 1 View  Bipolar left hip arthroplasty with cerclage wire.  Components are well seated. Cannulated screw fixation across an old  healed fracture of the right " femoral neck. Osteopenia. Degenerative  changes in the right hip, both SI joints and lower lumbar spine.    XR Chest 1 View  Single AP view of the chest was obtained.  Cardiomediastinal silhouette is within normal limits. No suspicious  focal pulmonary opacities. No significant pleural effusion or  pneumothorax. Post surgical changes of lower cervical spine.  Multilevel degenerative changes of the spine. No suspicious osseous  lesion. If clinical suspicion of rib fractures, remains high, rib  series is more sensitive for detection of subtle rib fractures.    CT Cervical Spine w/o Contrast  1. New subtle acute lucent fracture line through the superior right T2  endplate. Subtle loss of T2 vertebral body height on the right. No  posterior displacement of the fractured T2 vertebral body towards the  spinal canal. This fracture was not present on prior CT 9/22/2021.  2. No other fractures appreciated within the cervical spinal vertebral  bodies.  3. Extensive spinal fusion hardware in the cervical spine from C3 to  C6. Surgical hardware appears intact and in similar alignment to  prior. No evidence of loosening of the hardware.  4. Degenerative changes in the cervical spine as detailed above, most  pronounced at C2-C3 and C6-C7. These appear fairly similar to prior CT  9/22/2021.    CT Head w/o Contrast   1. No evidence of acute intracranial hemorrhage, mass, or herniation.  2. Mild diffuse parenchymal volume loss and white matter changes  likely due to chronic microvascular ischemic disease.    Cervical spine MRI w/o contrast    (Results Pending)  Thoracic spine MRI w/o contrast    (Results Pending)  Report per radiology    Laboratory:  UA with microscopic: ketones trace (A) o/w WNL     BMP: glucose 106 (H) o/w WNL (Creatinine 0.52)   CBC: WBC 7.1, HGB 13.2,      Emergency Department Course:  Reviewed:  I reviewed nursing notes, vitals, past medical history and Care Everywhere    Assessments:  0845 I obtained  history and examined the patient as noted above.   0920 I rechecked the patient and explained findings.     Consults:  1003 I spoke with the patient's daughter regarding patient's presentation, findings, and plan of care.    1040 I spoke with Dr. Montero of radiology regarding patient's presentation, findings, and plan of care.  1219 I spoke with the patient's daughter regarding patient's history.    1257 I spoke with Deborah fernandez of neurosurgery regarding patient's presentation, findings, and plan of care.      Disposition:  Care of the patient was transferred to my colleague Dr. Isidro pending MRI.     Impression & Plan     Medical Decision Making:  Pablo Baez is a 76 year old female with history of vertigo who presents with a fall yesterday evening from her couch and a fall in the kitchen after which she laid on the floor for an hour and a half.  Upon initial evaluation she is hypertensive but otherwise hemodynamically stable with normal vital signs.  She is afebrile.  She is oxygenating well on room air.  Head to toe physical exam was detailed above.  CT of the head and cervical spine were obtained.  CT of the head did not show any evidence of intracranial hemorrhage or skull fracture.  However CT of the cervical spine shows a T2 vertebral body fracture.  CT of the thoracic spine was obtained to further differentiate.  This confirmed T2 vertebral body fracture and also showed evidence of subacute additional fractures without evidence of cord compromise.  However on physical exam she does have some left wrist weakness of unclear chronicity.  In speaking with the patient's daughter it appears that this has been ongoing for the past few days.  She may have suffered a fall that led to this neurological deficit or may be due to more focal process.  Once again on exam she does not have any focal wrist pain or tenderness to raise my suspicious for wrist fracture or forearm fracture.  Possible distal nerve  impingement.  However given her known T2 spinal fracture will obtain MRIs of the cervical spine and thoracic spine to rule out cord insult.  I discussed the patient's presentation and CT findings with neurosurgery.  We will wait to follow-up on MRI imaging to decide patient's final disposition.  If MRIs are negative patient can be placed in a thoracic brace and can follow-up with neurosurgery in 6 to 8 weeks.  If more sinister pathology is found she will require appropriate intervention.  The remainder of her work-up was reassuring without any evidence of fracture or dislocation on plain film imaging.    Diagnosis:    ICD-10-CM    1. Closed fracture of second thoracic vertebra  S22.029A    2. Left hand weakness  R29.898        Scribe Disclosure:  I, Gómez Vo, am serving as a scribe at 8:44 AM on 1/12/2022 to document services personally performed by Luis M Sosa based on my observations and the provider's statements to me.          Luis M Sosa MD  01/13/22 0993

## 2022-01-12 NOTE — PROGRESS NOTES
Patient was fit with [facility]  by Aidan Larios Board eligable Orthotist/Prosthetist for T2 fx.  Kaden ZAYAS.

## 2022-01-12 NOTE — CONSULTS
NEUROSURGERY CONSULT    Neurosurgery was asked by Dr. Joseph to consult for left wrist weekness.    PLAN:    Based on her physical exam, we do feel that it would be in her best interest to obtain a left humerus x-ray to further assess our concern for any concerning pathology. Once the images have been obtained we will meet with her to further discuss possible surgical interventions or other conservative therapies.      If the humerus does not exhibit a fracture, an EMG and Neurology consult should be considered for a possible peripheral nerve injury, radial nerve palsy.    She does have some pathology on her cervical and thoracic spine MRI's but those findings would not corelate with her physical exam and will further address once we are able to review the humerus XR.     Please page our on-call service for any questions or concerns.     It has been a pleasure meeting Pablo Baez. Thank you for having us be involved in her care.    ______________________________________________________________________    HPI:    Pablo Baez is a pleasant 76 year old female who presented to the [facility]  for consultation on frequent falls. She does have parkinson's disease and states that her falls seem to be more frequent. She did fall yesterday. She has had left wrist weakness prior to this fall. She does feel that that weakness is getting better.    There are no bowel or bladder changes. No other concerns are voiced.    No past medical history on file.    No past surgical history on file.    No Known Allergies    Social History     Tobacco Use     Smoking status: Not on file     Smokeless tobacco: Not on file   Substance Use Topics     Alcohol use: Not on file       No family history on file.    Scheduled Medications      LORazepam  1 mg Intravenous Once       Home Medications  Sinemet  Gabapentin  Robinul  Cozaar  Prilosec  Seroquel    PRN Medication:    ROS: 10 point ROS neg other than the  symptoms noted above in the HPI.    Vitals:    BP (!) 169/78   Pulse 80   Temp 98.2  F (36.8  C) (Oral)   Resp 16   SpO2 95%   There is no height or weight on file to calculate BMI.  No intake/output data recorded.    Exam:    Patient appears comfortable, conversational, and in no apparent distress.   Head: Normocephalic, without obvious abnormality, atraumatic, no facial asymmetry.   Eyes: conjunctivae/corneas clear. PERRL, EOM's intact.   Throat: lips, mucosa, and tongue normal; teeth and gums normal.   Neck: supple, symmetrical, trachea midline, no adenopathy and thyroid: not enlarged, symmetric, no tenderness/mass/nodules.   Lungs: clear to auscultation bilaterally.   Heart: regular rate and rhythm.   Abdomen: soft, non-tender; bowel sounds normal; no masses, no organomegaly.   Pulses: 2+ and symmetric.   Skin: Skin color, texture, turgor normal. No rashes or lesions.     CN II-XII grossly intact, alert and appropriate with conversation and following commands.   Cervical spine is non tender to palpation. Appropriate range of motion of neck, not concerning for lhermitte's phenomenon.   Bilateral bicep 1/4 and tricep reflexes 1/4. Sensation intact throughout upper extremities.     UE muscle strength  Right  Left    Deltoid  5/5  5/5    Biceps  5/5  5/5    Triceps  5/5  5/5    Hand intrinsics  5/5  5/5    Hand grasp  5/5  5/5    Ramsey signs  neg  neg      Left wrist and finger extension is 0/5.  Left mid humerus is tender to palpation.  Thoracic / Lumbar spine is non tender to palpation   Intact sensation throughout lower extremities.   Bilateral patellar 2/4 and achilles reflex 1/4.     LE muscle strength  Right  Left    Iliopsoas (hip flexion)  5/5  5/5    Quad (knee extension)  5/5  5/5    Hamstring (knee flexion)  5/5  5/5    Gastrocnemius (PF)  5/5  5/5    Tibialis Ant. (DF)  5/5  5/5    EHL  5/5  5/5      Negative Babinski bilaterally. Negative for clonus.   Calves are soft and non-tender bilaterally.      Imaging: MRI THORACIC SPINE WITHOUT CONTRAST  1/12/2022 3:21 PM  Impression per radiology read - I have personally reviewed the images with the patient.    1. Acute to subacute appearing fracture of the T2 vertebral body which  demonstrates edema in the vertebral body. This fracture was better  depicted on recent thoracic spine CT report.  2. No significant edema in the fractured T5 vertebral body which  demonstrates mild loss of vertebral body height. This is favored to be  more subacute to chronic in age given the lack of edema.  3. Question mild edema within the fractured posterior spinous  processes of T6 and T7. Again these were better depicted on thoracic  spine CT.  4. No abnormal edema within the spinal cord. No significant spinal  canal narrowing in the thoracic spine.  5. Degenerative changes in the upper thoracic spine as detailed above  particularly at T2-3 and T3-4 which cause moderate to severe right  neural foraminal narrowings.    CT Thoracic Spine w/o Contrast    1. Acute-appearing lucent fracture line through the T2 vertebral body  involving the superior right endplate with slight loss of T2 vertebral  body height. This fracture was also seen on cervical spine CT from the  same day.  2. Fracture of the T5 vertebral body with sclerosis throughout this  vertebral body and loss of approximately 25% vertebral body height.  This may be more subacute in age given its sclerosis.  3. Lucent fracture lines of the posterior spinous processes of T6 and  T7 which are at least moderately displaced. These may be acute or  subacute as no surrounding bony bridging is appreciated.  4. Subacute-appearing fracture of the anterior left fourth rib which  is displaced but has mild surrounding bony bridging.  5. Chronic-appearing fracture deformities of the posterior left eighth  and ninth ribs.  6. Degenerative changes in the thoracic spine as detailed above.     XR Pelvis and Hip Left 1 View  Bipolar left hip  arthroplasty with cerclage wire.  Components are well seated. Cannulated screw fixation across an old  healed fracture of the right femoral neck. Osteopenia. Degenerative  changes in the right hip, both SI joints and lower lumbar spine.     XR Chest 1 View  Single AP view of the chest was obtained.  Cardiomediastinal silhouette is within normal limits. No suspicious  focal pulmonary opacities. No significant pleural effusion or  pneumothorax. Post surgical changes of lower cervical spine.  Multilevel degenerative changes of the spine. No suspicious osseous  lesion. If clinical suspicion of rib fractures, remains high, rib  series is more sensitive for detection of subtle rib fractures.     CT Cervical Spine w/o Contrast  1. New subtle acute lucent fracture line through the superior right T2  endplate. Subtle loss of T2 vertebral body height on the right. No  posterior displacement of the fractured T2 vertebral body towards the  spinal canal. This fracture was not present on prior CT 9/22/2021.  2. No other fractures appreciated within the cervical spinal vertebral  bodies.  3. Extensive spinal fusion hardware in the cervical spine from C3 to  C6. Surgical hardware appears intact and in similar alignment to  prior. No evidence of loosening of the hardware.  4. Degenerative changes in the cervical spine as detailed above, most  pronounced at C2-C3 and C6-C7. These appear fairly similar to prior CT  9/22/2021.     CT Head w/o Contrast   1. No evidence of acute intracranial hemorrhage, mass, or herniation.  2. Mild diffuse parenchymal volume loss and white matter changes  likely due to chronic microvascular ischemic disease.    Show images for Cervical spine MRI w/o contrast    Available labs at time of consult:       Recent Labs   Lab 01/12/22  0908   WBC 7.1   HGB 13.2   HCT 42.4   *        Recent Labs   Lab 01/12/22  0908   WBC 7.1   HGB 13.2   HCT 42.4   *        No results for input(s):  SED, CRP in the last 168 hours.  Recent Labs   Lab 01/12/22  0908   HGB 13.2     No results for input(s): INR in the last 168 hours.  Recent Labs   Lab 01/12/22  0908        Recent Labs   Lab 01/12/22  0908   WBC 7.1         Respectfully,    SAROJ Laureano, PAKAVIN  [facility]     Tel: [phone]      All imaging, physical findings, and the above plan have been reviewed with Dr. Guzman.

## 2022-01-13 ENCOUNTER — APPOINTMENT (OUTPATIENT)
Dept: OCCUPATIONAL THERAPY | Facility: CLINIC | Age: 77
End: 2022-01-13
Attending: PHYSICIAN ASSISTANT
Payer: MEDICARE

## 2022-01-13 ENCOUNTER — APPOINTMENT (OUTPATIENT)
Dept: SPEECH THERAPY | Facility: CLINIC | Age: 77
End: 2022-01-13
Attending: PHYSICIAN ASSISTANT
Payer: MEDICARE

## 2022-01-13 ENCOUNTER — APPOINTMENT (OUTPATIENT)
Dept: PHYSICAL THERAPY | Facility: CLINIC | Age: 77
End: 2022-01-13
Attending: PHYSICIAN ASSISTANT
Payer: MEDICARE

## 2022-01-13 PROCEDURE — G0378 HOSPITAL OBSERVATION PER HR: HCPCS

## 2022-01-13 PROCEDURE — 97116 GAIT TRAINING THERAPY: CPT | Mod: GP | Performed by: PHYSICAL THERAPIST

## 2022-01-13 PROCEDURE — 96372 THER/PROPH/DIAG INJ SC/IM: CPT | Performed by: PHYSICIAN ASSISTANT

## 2022-01-13 PROCEDURE — 97535 SELF CARE MNGMENT TRAINING: CPT | Mod: GO

## 2022-01-13 PROCEDURE — 92526 ORAL FUNCTION THERAPY: CPT | Mod: GN

## 2022-01-13 PROCEDURE — 97161 PT EVAL LOW COMPLEX 20 MIN: CPT | Mod: GP | Performed by: PHYSICAL THERAPIST

## 2022-01-13 PROCEDURE — 250N000013 HC RX MED GY IP 250 OP 250 PS 637: Performed by: HOSPITALIST

## 2022-01-13 PROCEDURE — 92610 EVALUATE SWALLOWING FUNCTION: CPT | Mod: GN

## 2022-01-13 PROCEDURE — 250N000009 HC RX 250: Performed by: PHYSICIAN ASSISTANT

## 2022-01-13 PROCEDURE — 250N000013 HC RX MED GY IP 250 OP 250 PS 637: Performed by: PHYSICIAN ASSISTANT

## 2022-01-13 PROCEDURE — 250N000011 HC RX IP 250 OP 636: Performed by: PHYSICIAN ASSISTANT

## 2022-01-13 PROCEDURE — 97530 THERAPEUTIC ACTIVITIES: CPT | Mod: GP,59 | Performed by: PHYSICAL THERAPIST

## 2022-01-13 PROCEDURE — 97530 THERAPEUTIC ACTIVITIES: CPT | Mod: GO

## 2022-01-13 PROCEDURE — 97165 OT EVAL LOW COMPLEX 30 MIN: CPT | Mod: GO

## 2022-01-13 PROCEDURE — 99226 PR SUBSEQUENT OBSERVATION CARE,LEVEL III: CPT | Performed by: PHYSICIAN ASSISTANT

## 2022-01-13 PROCEDURE — 99207 PR CDG-CODE CATEGORY CHANGED: CPT | Performed by: PHYSICIAN ASSISTANT

## 2022-01-13 RX ORDER — CARBIDOPA AND LEVODOPA 25; 100 MG/1; MG/1
1 TABLET ORAL 2 TIMES DAILY
Status: DISCONTINUED | OUTPATIENT
Start: 2022-01-14 | End: 2022-01-17 | Stop reason: HOSPADM

## 2022-01-13 RX ORDER — CARBIDOPA AND LEVODOPA 25; 100 MG/1; MG/1
2 TABLET ORAL 2 TIMES DAILY
Status: DISCONTINUED | OUTPATIENT
Start: 2022-01-13 | End: 2022-01-13

## 2022-01-13 RX ORDER — LOSARTAN POTASSIUM 25 MG/1
25 TABLET ORAL DAILY
Status: DISCONTINUED | OUTPATIENT
Start: 2022-01-13 | End: 2022-01-17 | Stop reason: HOSPADM

## 2022-01-13 RX ORDER — GLYCOPYRROLATE 1 MG/1
1 TABLET ORAL 2 TIMES DAILY PRN
Status: DISCONTINUED | OUTPATIENT
Start: 2022-01-13 | End: 2022-01-17 | Stop reason: HOSPADM

## 2022-01-13 RX ORDER — CARBIDOPA AND LEVODOPA 25; 100 MG/1; MG/1
2 TABLET ORAL 2 TIMES DAILY
Status: DISCONTINUED | OUTPATIENT
Start: 2022-01-13 | End: 2022-01-17 | Stop reason: HOSPADM

## 2022-01-13 RX ORDER — CARBIDOPA AND LEVODOPA 25; 100 MG/1; MG/1
1 TABLET ORAL 2 TIMES DAILY
Status: DISCONTINUED | OUTPATIENT
Start: 2022-01-14 | End: 2022-01-13

## 2022-01-13 RX ORDER — CARBIDOPA AND LEVODOPA 25; 100 MG/1; MG/1
2 TABLET, EXTENDED RELEASE ORAL AT BEDTIME
Status: DISCONTINUED | OUTPATIENT
Start: 2022-01-13 | End: 2022-01-13

## 2022-01-13 RX ORDER — GLYCOPYRROLATE 1 MG/1
1 TABLET ORAL EVERY MORNING
Status: DISCONTINUED | OUTPATIENT
Start: 2022-01-13 | End: 2022-01-17 | Stop reason: HOSPADM

## 2022-01-13 RX ORDER — CARBIDOPA AND LEVODOPA 25; 100 MG/1; MG/1
2 TABLET, EXTENDED RELEASE ORAL DAILY
Status: DISCONTINUED | OUTPATIENT
Start: 2022-01-13 | End: 2022-01-17 | Stop reason: HOSPADM

## 2022-01-13 RX ADMIN — QUETIAPINE FUMARATE 25 MG: 25 TABLET ORAL at 01:10

## 2022-01-13 RX ADMIN — ACETAMINOPHEN 975 MG: 325 TABLET, FILM COATED ORAL at 21:20

## 2022-01-13 RX ADMIN — CARBIDOPA AND LEVODOPA 2 TABLET: 25; 100 TABLET, EXTENDED RELEASE ORAL at 01:11

## 2022-01-13 RX ADMIN — QUETIAPINE FUMARATE 12.5 MG: 25 TABLET ORAL at 21:20

## 2022-01-13 RX ADMIN — LOSARTAN POTASSIUM 25 MG: 25 TABLET, FILM COATED ORAL at 15:59

## 2022-01-13 RX ADMIN — ENOXAPARIN SODIUM 40 MG: 40 INJECTION SUBCUTANEOUS at 21:20

## 2022-01-13 RX ADMIN — CARBIDOPA AND LEVODOPA 2 TABLET: 25; 100 TABLET, EXTENDED RELEASE ORAL at 23:07

## 2022-01-13 RX ADMIN — ACETAMINOPHEN 975 MG: 325 TABLET, FILM COATED ORAL at 10:28

## 2022-01-13 RX ADMIN — NEOMYCIN SULFATE, POLYMYXIN B SULFATE, AND DEXAMETHASONE: 3.5; 10000; 1 OINTMENT OPHTHALMIC at 23:20

## 2022-01-13 RX ADMIN — CARBIDOPA AND LEVODOPA 2 TABLET: 25; 100 TABLET ORAL at 15:59

## 2022-01-13 RX ADMIN — CARBIDOPA AND LEVODOPA 3 HALF-TAB: 25; 100 TABLET ORAL at 10:28

## 2022-01-13 RX ADMIN — CARBIDOPA AND LEVODOPA 2 TABLET: 25; 100 TABLET ORAL at 20:40

## 2022-01-13 RX ADMIN — ENOXAPARIN SODIUM 40 MG: 40 INJECTION SUBCUTANEOUS at 01:11

## 2022-01-13 RX ADMIN — ACETAMINOPHEN 975 MG: 325 TABLET, FILM COATED ORAL at 01:11

## 2022-01-13 RX ADMIN — ACETAMINOPHEN 975 MG: 325 TABLET, FILM COATED ORAL at 15:59

## 2022-01-13 ASSESSMENT — ACTIVITIES OF DAILY LIVING (ADL): DEPENDENT_IADLS:: CLEANING;COOKING;LAUNDRY;SHOPPING;MEAL PREPARATION;MEDICATION MANAGEMENT;TRANSPORTATION

## 2022-01-13 NOTE — PLAN OF CARE
PRIMARY DIAGNOSIS: ACUTE PAIN  OUTPATIENT/OBSERVATION GOALS TO BE MET BEFORE DISCHARGE:  1. Pain Status: Improved-controlled with oral pain medications.    2. Return to near baseline physical activity: No    3. Cleared for discharge by consultants (if involved): No    Discharge Planner Nurse   Safe discharge environment identified: No  Barriers to discharge: Yes       Entered by: Lyric Angel 01/13/2022 1:53 AM     Vitals are Temp: 97.6  F (36.4  C) Temp src: Oral BP: (!) 167/84 Pulse: 77   Resp: 18 SpO2: 95 %.  Upon arrival to unit, pt appears confused, but is now alert and oriented x4 with some forgetfulness. Pt has not been out of bed.   Pt is a Regular diet.  They are complaining of 8/10 pain in their low back.  Tylenol given for pain. Pt sleeping after med administration. Pt refused to take  brace off as she states her back feels more supported after it was applied. Patient is Saline locked. Purewick in place. Will continue to monitor and provide cares.     Please review provider order for any additional goals.   Nurse to notify provider when observation goals have been met and patient is ready for discharge.

## 2022-01-13 NOTE — ED NOTES
[facility]  ED Nurse Handoff Report    Pablo Baez is a 76 year old female   ED Chief complaint: Fall  . ED Diagnosis:   Final diagnoses:   Closed fracture of second thoracic vertebra   Left hand weakness     Allergies: No Known Allergies    Code Status: Full Code  Activity level - Baseline/Home:  Stand by Assist. Activity Level - Current:   Assist X 2. Lift room needed: No. Bariatric: No   Needed: No   Isolation: No. Infection: Not Applicable.     Vital Signs:   Vitals:    01/12/22 0849   BP: (!) 169/78   Pulse: 80   Resp: 16   Temp: 98.2  F (36.8  C)   TempSrc: Oral   SpO2: 95%       Cardiac Rhythm:  ,      Pain level:    Patient confused: Yes. Patient Falls Risk: Yes.   Elimination Status: Has voided   Patient Report - Initial Complaint: Came in by EMS for repeat falls. Focused Assessment: Pablo Baez is a 76 year old female with history of vertigo who presents with a fall yesterday evening from her couch and a fall in the kitchen after which she laid on the floor for an hour and a half. She hit her head but denies lose of consciousness. She denies chest pain, abdominal pain, and back pain but endorses mild hip pain. She has had several falls in the past few weeks.  Tests Performed:   Labs Ordered and Resulted from Time of ED Arrival to Time of ED Departure   BASIC METABOLIC PANEL - Abnormal       Result Value    Sodium 139      Potassium 4.2      Chloride 108      Carbon Dioxide (CO2) 26      Anion Gap 5      Urea Nitrogen 12      Creatinine 0.52      Calcium 9.6      Glucose 106 (*)     GFR Estimate >90     ROUTINE UA WITH MICROSCOPIC REFLEX TO CULTURE - Abnormal    Color Urine Light Yellow      Appearance Urine Clear      Glucose Urine Negative      Bilirubin Urine Negative      Ketones Urine Trace (*)     Specific Gravity Urine 1.015      Blood Urine Negative      pH Urine 7.0      Protein Albumin Urine Negative      Urobilinogen Urine Normal      Nitrite Urine Negative       Leukocyte Esterase Urine Negative      RBC Urine 2      WBC Urine 1     CBC WITH PLATELETS AND DIFFERENTIAL - Abnormal    WBC Count 7.1      RBC Count 3.91      Hemoglobin 13.2      Hematocrit 42.4       (*)     MCH 33.8 (*)     MCHC 31.1 (*)     RDW 11.7      Platelet Count 326      % Neutrophils 78      % Lymphocytes 15      % Monocytes 6      % Eosinophils 1      % Basophils 0      % Immature Granulocytes 0      NRBCs per 100 WBC 0      Absolute Neutrophils 5.5      Absolute Lymphocytes 1.1      Absolute Monocytes 0.5      Absolute Eosinophils 0.0      Absolute Basophils 0.0      Absolute Immature Granulocytes 0.0      Absolute NRBCs 0.0     CK TOTAL   . Abnormal Results:   XR Humerus Left G/E 2 Views   Final Result   IMPRESSION: Left upper arm negative for fracture or bone lesion. No evidence of glenohumeral or elbow joint alignment. Generalized demineralization.      Thoracic spine MRI w/o contrast   Final Result   IMPRESSION:     1. Acute to subacute appearing fracture of the T2 vertebral body which   demonstrates edema in the vertebral body. This fracture was better   depicted on recent thoracic spine CT report.   2. No significant edema in the fractured T5 vertebral body which   demonstrates mild loss of vertebral body height. This is favored to be   more subacute to chronic in age given the lack of edema.   3. Question mild edema within the fractured posterior spinous   processes of T6 and T7. Again these were better depicted on thoracic   spine CT.   4. No abnormal edema within the spinal cord. No significant spinal   canal narrowing in the thoracic spine.   5. Degenerative changes in the upper thoracic spine as detailed above   particularly at T2-3 and T3-4 which cause moderate to severe right   neural foraminal narrowings.         AGUSTO JOVEL MD            SYSTEM ID:  [id number]      Cervical spine MRI w/o contrast   Final Result   IMPRESSION:     1. Extensive spinal fusion hardware in the  cervical spine from C3 to   C6. This metal hardware causes artifact and limits evaluation by MRI.   2. No abnormal edema within the cervical vertebral bodies to suggest   acute fracture.   3. Marked degenerative changes in the cervical spine at C2-3 and C6-7   as detailed above. There is moderate spinal canal narrowing at C2-3   and mild to moderate spinal canal narrowing at C6-7.   4. No abnormal edema appreciated in the cervical spinal cord.   5. Please see dedicated thoracic spine MRI report for details of the   T2 vertebral body fracture.         AGUSTO JOVEL MD            SYSTEM ID:  [id number]      CT Thoracic Spine w/o Contrast   Final Result   IMPRESSION:      1. Acute-appearing lucent fracture line through the T2 vertebral body   involving the superior right endplate with slight loss of T2 vertebral   body height. This fracture was also seen on cervical spine CT from the   same day.   2. Fracture of the T5 vertebral body with sclerosis throughout this   vertebral body and loss of approximately 25% vertebral body height.   This may be more subacute in age given its sclerosis.   3. Lucent fracture lines of the posterior spinous processes of T6 and   T7 which are at least moderately displaced. These may be acute or   subacute as no surrounding bony bridging is appreciated.   4. Subacute-appearing fracture of the anterior left fourth rib which   is displaced but has mild surrounding bony bridging.   5. Chronic-appearing fracture deformities of the posterior left eighth   and ninth ribs.   6. Degenerative changes in the thoracic spine as detailed above.      AGUSTO JOVEL MD            SYSTEM ID:  [id number]      XR Pelvis and Hip Left 1 View   Final Result   IMPRESSION: Bipolar left hip arthroplasty with cerclage wire.   Components are well seated. Cannulated screw fixation across an old   healed fracture of the right femoral neck. Osteopenia. Degenerative   changes in the right hip, both SI joints and lower lumbar  spine.      ISHMAEL GRADY MD            SYSTEM ID:  [id number]      XR Chest 1 View   Final Result   IMPRESSION: Single AP view of the chest was obtained.   Cardiomediastinal silhouette is within normal limits. No suspicious   focal pulmonary opacities. No significant pleural effusion or   pneumothorax. Post surgical changes of lower cervical spine.   Multilevel degenerative changes of the spine. No suspicious osseous   lesion. If clinical suspicion of rib fractures, remains high, rib   series is more sensitive for detection of subtle rib fractures.      KANNAN BRITO MD            SYSTEM ID:  [id number]      CT Cervical Spine w/o Contrast   Final Result   IMPRESSION:    1. New subtle acute lucent fracture line through the superior right T2   endplate. Subtle loss of T2 vertebral body height on the right. No   posterior displacement of the fractured T2 vertebral body towards the   spinal canal. This fracture was not present on prior CT 9/22/2021.   2. No other fractures appreciated within the cervical spinal vertebral   bodies.   3. Extensive spinal fusion hardware in the cervical spine from C3 to   C6. Surgical hardware appears intact and in similar alignment to   prior. No evidence of loosening of the hardware.   4. Degenerative changes in the cervical spine as detailed above, most   pronounced at C2-C3 and C6-C7. These appear fairly similar to prior CT   9/22/2021.      Results discussed with Luis M Sosa on 1/12/2022 at 10:39 AM      AGUSTO JOVEL MD            SYSTEM ID:  [id number]      CT Head w/o Contrast   Final Result   IMPRESSION:      1. No evidence of acute intracranial hemorrhage, mass, or herniation.   2. Mild diffuse parenchymal volume loss and white matter changes   likely due to chronic microvascular ischemic disease.         AGUSTO JOVEL MD            SYSTEM ID:  [id number]      .   Treatments provided: Labs, meds, imaging  Family Comments: Daughter updated   OBS brochure/video  discussed/provided to patient:  No  ED Medications:   Medications   LORazepam (ATIVAN) injection 1 mg (has no administration in time range)     Drips infusing:  No  For the majority of the shift, the patient's behavior Green. Interventions performed were Care explained.    Sepsis treatment initiated: No     Patient tested for COVID 19 prior to admission: YES    ED Nurse Name/Phone Number: Deborah Ramirez RN,   6:59 PM    RECEIVING UNIT ED HANDOFF REVIEW    Above ED Nurse Handoff Report was reviewed: Yes  Reviewed by: Lyric Angel RN on January 12, 2022 at 10:44 PM

## 2022-01-13 NOTE — PHARMACY-ADMISSION MEDICATION HISTORY
Med rec completed by evening pharmacist last night per [facility] list/MAR.      Prior to Admission medications    Medication Sig Last Dose Taking? Auth Provider   acetaminophen (TYLENOL) 500 MG tablet Take 1,000 mg by mouth 3 times daily 1/12/2022 at x 1 Yes Unknown, Entered By History   carbidopa-levodopa (SINEMET CR)  MG CR tablet Take 1 tablet by mouth daily at 11pm.  Indications: Parkinson's Disease 1/11/2022 at 2300 Yes Unknown, Entered By History   carbidopa-levodopa (SINEMET)  MG tablet Take 1.5 tablets by mouth 2 times daily AM and noon 1/12/2022 at x 1 Yes Unknown, Entered By History   carbidopa-levodopa (SINEMET)  MG tablet Take 2 tablets by mouth 2 times daily Afternoon and bedtime 1/11/2022 at x 2 Yes Unknown, Entered By History   cholecalciferol 50 MCG (2000 UT) tablet Take 2,000 Units by mouth daily 1/12/2022 at am Yes Unknown, Entered By History   famotidine (PEPCID) 20 MG tablet Take 20 mg by mouth daily 1/12/2022 at am Yes Unknown, Entered By History   glycopyrrolate (ROBINUL) 1 MG tablet Take 1 mg by mouth 2 times daily as needed for other (salivation) Past Month at Unknown time Yes Unknown, Entered By History   glycopyrrolate (ROBINUL) 1 MG tablet Take 1 tablet by mouth every morning 1/12/2022 at am Yes Unknown, Entered By History   losartan (COZAAR) 25 MG tablet Take 25 mg by mouth daily 1/12/2022 at am Yes Unknown, Entered By History   methocarbamol (ROBAXIN) 500 MG tablet Take 250 mg by mouth 2 times daily as needed for muscle spasms Past Month at Unknown time Yes Unknown, Entered By History   neomycin-polymixin-dexamethasone (MAXITROL) ophthalmic ointment Place 0.25 inches into both eyes At Bedtime 1/11/2022 at hs Yes Unknown, Entered By History   omeprazole (PRILOSEC) 20 MG DR capsule Take 20 mg by mouth daily 1/12/2022 at am Yes Unknown, Entered By History   Pediatric Multivit-Minerals-C (GUMMI BEAR MULTIVITAMIN/MIN PO) Take 2 tablets by mouth every morning 1/12/2022 at am  Yes Unknown, Entered By History   polyvinyl alcohol (LIQUIFILM TEARS) 1.4 % ophthalmic solution Place 1 drop into both eyes every 4 hours as needed Past Week at Unknown time Yes Unknown, Entered By History   QUEtiapine (SEROQUEL) 25 MG tablet Take 12.5 mg by mouth daily as needed (hallucinations) More than a month at Unknown time Yes Unknown, Entered By History   QUEtiapine (SEROQUEL) 25 MG tablet Take 1 tablet by mouth At Bedtime 1/11/2022 at hs Yes Unknown, Entered By History   sennosides (SENOKOT) 8.6 MG tablet Take 1 tablet by mouth daily 1/12/2022 at am Yes Unknown, Entered By History   ketoconazole (NIZORAL) 2 % external shampoo APPLY TOPICALLY EVERY OTHER DAY. INDICATIONS  TINEA VERSICOLOR   Unknown, Entered By History

## 2022-01-13 NOTE — PLAN OF CARE
PRIMARY DIAGNOSIS: FREQUENT FALLS, T2 & T5 VERTEBRAL BODY FX, LOW BACK PAIN  OUTPATIENT/OBSERVATION GOALS TO BE MET BEFORE DISCHARGE:  1. Pain Status: Improved-controlled with oral pain medications.    2. Return to near baseline physical activity: No    3. Cleared for discharge by consultants (if involved): No    Discharge Planner Nurse   Safe discharge environment identified: No  Barriers to discharge: Yes       Entered by: Lyric Angel 01/13/2022 5:33 AM     Vitals are Temp: 98.8  F (37.1  C) Temp src: Axillary BP: (!) 143/71 Pulse: 81   Resp: 18 SpO2: 94 %.  Upon arrival to unit, pt appears confused, but is now alert and oriented x4 with some forgetfulness. Pt has not been out of bed.   Pt is a Regular diet.  They are complaining of 8/10 pain in their low back.  Tylenol given for pain. Pt sleeping after med administration. Pt refused to take  brace off as she states her back feels more supported after it was applied. Patient is Saline locked. Purewick in place. Plan: PT, OT, SW, pain control.    Please review provider order for any additional goals.   Nurse to notify provider when observation goals have been met and patient is ready for discharge.

## 2022-01-13 NOTE — PLAN OF CARE
ROOM # 206-1    Living Situation (if not independent, order SW consult): [facility]  Facility name:  : [facility]    Activity level at baseline: SBA  Activity level on admit: Assist of 2, have not been out of bed      Patient registered to observation; given Patient Bill of Rights; given the opportunity to ask questions about observation status and their plan of care.  Patient has been oriented to the observation room, bathroom and call light is in place.    Discussed discharge goals and expectations with patient/family.

## 2022-01-13 NOTE — ED NOTES
Took over the care of this patient for Dr. Sosa.  Patient has a T2 fracture on MRI as well as CT scan.  I consult to orthotics who then came down to fit her for brace.  We then ambulated her with the brace and she failed ambulation test.  She is very unstable according to the nurse.  She does not ambulate with the brace on.  Due to safety concern, patient would require admission for PT and OT.  Patient comfortable here.  Plan discussed with family.     Mina Isidro MD  01/12/22 9471

## 2022-01-13 NOTE — PLAN OF CARE
PRIMARY DIAGNOSIS: Mx Falls w/ T2 & T5 Vertebral Body Fracture    OUTPATIENT/OBSERVATION GOALS TO BE MET BEFORE DISCHARGE  1. Orthostatic performed: N/A    2. Tolerating PO medications: Yes    3. Return to near baseline physical activity: No - baseline independent w/ walker    4. Cleared for discharge by consultants (if involved): Not yet    Discharge Planner Nurse   Safe discharge environment identified: No  Barriers to discharge: Yes - needs to have safe discharge plan in place            Please review provider order for any additional goals.   Nurse to notify provider when observation goals have been met and patient is ready for discharge.      Pt here w/ multiple falls.  Lives alone and is normally up independent w/ her walker.  Patient is clearly choking on oral intake at times.  She took her pills fine with a sip of water, but choked on sausage and meatloaf.  Patient wearing C-collar even though this is not ordered.  Patient states she wants to keep this on for comfort.  I explained to her that the collar was keeping her from swallowing safely and needed to be removed.  She is no longer wearing the c-collar.  Diet order adjusted by provider. Vitals stable on RA.  Lung sounds clear.

## 2022-01-13 NOTE — PROGRESS NOTES
Neurosurgery Brief Note:    EXAM: XR HUMERUS LEFT G/E 2 VIEWS  LOCATION: [facility]  DATE/TIME: 1/12/2022 5:19 PM  IMPRESSION: Left upper arm negative for fracture or bone lesion. No evidence of glenohumeral or elbow joint alignment. Generalized demineralization.    Imaging reviewed with Dr. Guzman.     - Recommend Neurology consult for possible peripheral nerve injury, radial nerve palsy  - No brace recommended for thoracic spine fractures   - Recommend outpatient follow-up with our NSG clinic in 6 weeks to evaluate fracture healing, XR prior to appt.     Please call with any further questions.     Kathy Ronquillo CNP  [facility]  [address] [address]  Tel [phone]  Pager [phone]

## 2022-01-13 NOTE — ED NOTES
Patient unable to get out of bed to ambulate. Difficulty following directions to get up for ambulation with collar and back brace

## 2022-01-13 NOTE — H&P
History and Physical     Pablo Baez MRN# [id number]   YOB: 1945 Age: 76 year old      Date of Admission:  1/12/2022    Primary care provider: Jany Glover          Assessment and Plan:   Pablo Baez is a 76 year old female with a PMH significant for Parkinson's disease, BPV, hypertension, GERD, hypertension and osteoarthritis, osteoporosis who presents after a unwitnessed fall with complaints of back pain.     Patient was discussed with Dr. Isidro, who was provider in ED. Chart review of ED work up was reviewed as well as chart review of Care Everywhere, previous visits and admissions.     #Recent fall with mid back pain and T2 fracture  #Suspect frequent falling  -Patient recalls a fall 1 week ago when she somehow fell on her head from her chair without loss of consciousness  -Nursing sent her in today after being found on the floor for approximately 1-1/2 hours but she does not recall this fall  -Initially she only had complaints of hip pain and also left wrist weakness  -Very thorough imaging including CT head, CT cervical spine CT thoracic spine, MR cervical spine MR thoracic spine, x-ray of the left humerus and x-ray of the pelvis/hip revealed acute appearing T2 vertebral body fracture with edema and fracture at the superior right endplate as well as fracture of the T5 vertebral body.  -Remarkably she is not having much pain with this  -Spine surgery called from the emergency room who recommends bracing with [place] .  -Failed road test after this  -PT and social work consults    #Left wrist weakness  -Outpatient EMG and neurology referral    #Parkinson's disease  -Continue Sinemet    #Suspect cognitive decline  -Patient is alert and oriented but unable to provide an accurate history  -OT consult for SLUMs assessment        Social: Lives in assisted living  Code: Discussed with patient and they have chosen full code  VTE prophylaxis: Lovenox  Disposition:  Observation                    Chief Complaint:   Back pain after fall         History of Present Illness:   History is limited as patient is not a good historian    Pablo Baez is a 76 year old female who presents with after a fall that she does not remember.  On discussion she actually recalls multiple falls but specifically does not remember this fall however the nurse that works with her does believe she fell as she was found on the floor.  She recalls a fall out of her chair hitting her head without loss of consciousness recently.  She does complain of mid back pain but otherwise has no complaints of shortness of breath, cough, abdominal pain, nausea, vomiting, diarrhea and urinary symptoms.  She becomes tearful when talking about staying the night in the hospital as she does not want this to be the last days of her life.             Past Medical History:   Parkinson's disease  Hypertension  GERD  Osteoarthritis/osteoporosis  Vertigo            Past Surgical History:   Umbilical hernia repair  Shoulder surgery  Cervical spine fusion  Hip surgery after fracture            Social History:     Social History     Socioeconomic History     Marital status:      Spouse name: Not on file     Number of children: Not on file     Years of education: Not on file     Highest education level: Not on file   Occupational History     Not on file   Tobacco Use     Smoking status: Not on file     Smokeless tobacco: Not on file   Substance and Sexual Activity     Alcohol use: Not on file     Drug use: Not on file     Sexual activity: Not on file   Other Topics Concern     Not on file   Social History Narrative     Not on file     Social Determinants of Health     Financial Resource Strain: Not on file   Food Insecurity: Not on file   Transportation Needs: Not on file   Physical Activity: Not on file   Stress: Not on file   Social Connections: Not on file   Intimate Partner Violence: Not on file   Housing Stability:  Not on file               Family History:   Family history reviewed and is non contributory         Allergies:    No Known Allergies            Medications:     Prior to Admission medications    Medication Sig Last Dose Taking? Auth Provider   acetaminophen (TYLENOL) 500 MG tablet Take 1,000 mg by mouth 3 times daily   Unknown, Entered By History   carbidopa-levodopa (SINEMET CR)  MG CR tablet Take 1 tablet by mouth daily at 11pm.  Indications: Parkinson's Disease   Unknown, Entered By History   carbidopa-levodopa (SINEMET)  MG tablet TAKE 2 TABS BY MOUTH @ 7:30AM, 1 & 1/2 TAB @ 12PM, 2 TABS @ 4:30PM, & THEN 2 TABS @ 9PM (PARKINSON)   Unknown, Entered By History   cholecalciferol 50 MCG (2000 UT) tablet Take 2,000 Units by mouth daily   Unknown, Entered By History   gabapentin (NEURONTIN) 100 MG capsule Take 100 mg by mouth At Bedtime   Unknown, Entered By History   glycopyrrolate (ROBINUL) 1 MG tablet Take 1 tablet by mouth every morning   Unknown, Entered By History   ketoconazole (NIZORAL) 2 % external shampoo APPLY TOPICALLY EVERY OTHER DAY. INDICATIONS  TINEA VERSICOLOR   Unknown, Entered By History   losartan (COZAAR) 25 MG tablet Take 25 mg by mouth daily   Unknown, Entered By History   methocarbamol (ROBAXIN) 500 MG tablet Take 1 tablet by mouth 2 times daily as needed   Unknown, Entered By History   neomycin-polymixin-dexamethasone (MAXITROL) ophthalmic ointment Place 0.25 inches into both eyes At Bedtime   Unknown, Entered By History   omeprazole (PRILOSEC) 20 MG DR capsule Take 20 mg by mouth daily   Unknown, Entered By History   Pediatric Multivit-Minerals-C (GUMMI BEAR MULTIVITAMIN/MIN PO) Take 2 tablets by mouth every morning   Unknown, Entered By History   polyvinyl alcohol (LIQUIFILM TEARS) 1.4 % ophthalmic solution Place 1 drop into both eyes every 4 hours as needed   Unknown, Entered By History   QUEtiapine (SEROQUEL) 25 MG tablet Take 1 tablet by mouth At Bedtime   Unknown, Entered By  History              Review of Systems:   A Comprehensive greater than 10 system review of systems was carried out.  Pertinent positives and negatives are noted above.  Otherwise negative for contributory information.            Physical Exam:   Blood pressure (!) 169/78, pulse 80, temperature 98.2  F (36.8  C), temperature source Oral, resp. rate 16, SpO2 95 %.  Exam:  GENERAL:  Comfortable.  PSYCH: pleasant, oriented but poor historian, No acute distress.  HEENT:  PERRLA. Normal conjunctiva, normal hearing, nasal mucosa and Oropharynx are normal.  NECK:  Supple, no neck vein distention, adenopathy or bruits, normal thyroid.  HEART:  Normal S1, S2 with no murmur, no pericardial rub, gallops or S3 or S4.  LUNGS:  Clear to auscultation, normal Respiratory effort. No wheezing, rales or ronchi.  ABDOMEN:  Soft, no hepatosplenomegaly, normal bowel sounds. Non-tender, non distended.   EXTREMITIES:  No pedal edema, +2 pulses bilateral and equal.  SKIN:  Dry to touch, No rash, wound or ulcerations.  NEUROLOGIC:  CN 2-12 grossly intact,  sensation is intact with no focal deficits.               Data:     Recent Labs   Lab 01/12/22  0908   WBC 7.1   HGB 13.2   HCT 42.4   *        Recent Labs   Lab 01/12/22  0908      POTASSIUM 4.2   CHLORIDE 108   CO2 26   ANIONGAP 5   *   BUN 12   CR 0.52   GFRESTIMATED >90   GIOVANI 9.6     Recent Labs   Lab 01/12/22  0954   COLOR Light Yellow   APPEARANCE Clear   URINEGLC Negative   URINEBILI Negative   URINEKETONE Trace*   SG 1.015   UBLD Negative   URINEPH 7.0   PROTEIN Negative   NITRITE Negative   LEUKEST Negative   RBCU 2   WBCU 1         Recent Results (from the past 24 hour(s))   CT Head w/o Contrast    Narrative    CT SCAN OF THE HEAD WITHOUT CONTRAST   1/12/2022 10:18 AM     HISTORY: Fall. Pain. Head trauma.    TECHNIQUE:  Axial images of the head and coronal reformations without  IV contrast material. Radiation dose for this scan was reduced  using  automated exposure control, adjustment of the mA and/or kV according  to patient size, or iterative reconstruction technique.    COMPARISON: Head CT 9/22/2021.    FINDINGS: There is no evidence of intracranial hemorrhage, mass, acute  infarct or anomaly. The ventricles are normal in size, shape and  configuration. Mild diffuse parenchymal volume loss. Mild patchy  periventricular white matter hypodensities which are nonspecific, but  likely related to chronic microvascular ischemic disease.     The visualized portions of the sinuses and mastoids appear normal. The  bony calvarium and bones of the skull base appear intact.       Impression    IMPRESSION:     1. No evidence of acute intracranial hemorrhage, mass, or herniation.  2. Mild diffuse parenchymal volume loss and white matter changes  likely due to chronic microvascular ischemic disease.      AGUSTO JOVEL MD         SYSTEM ID:  [email or web address]   CT Cervical Spine w/o Contrast    Narrative    CT CERVICAL SPINE WITHOUT CONTRAST January 12, 2022 10:24 AM     HISTORY: Fall. Neck trauma. Pain.     TECHNIQUE: Axial images of the cervical spine were obtained without  intravenous contrast. Multiplanar reformations were performed.  Radiation dose for this scan was reduced using automated exposure  control, adjustment of the mA and/or kV according to patient size, or  iterative reconstruction technique.    COMPARISON: Cervical spine CT 9/22/2021.    FINDINGS: Extensive spinal fusion hardware again seen throughout the  cervical spine. Anterior plate and screw fixation hardware is present  from C3 to C6. Corpectomy changes are again present at C4.  Intervertebral disc spacers are seen from C3 to C6. Hardware appears  intact and in similar alignment to prior CT. No lucency around the  surgical screws to suggest loosening.    New lucent fracture line at the superior T2 endplate to the right.  There is subtle deformity of the superior right T2 endplate. This was  not present  on 9/22/2021. No posterior displacement of the fractured  vertebral body towards the spinal canal. Fracture line does not appear  to extend into the pedicles.    Marked degenerative endplate changes and loss of disc height at C2-C3,  C6-C7, and T2-T3. Moderate spinal canal narrowing at C2-C3 from disc  osteophyte as well as facet hypertrophy. Mild spinal canal narrowing  at C6-C7. Multiple levels of neural foraminal narrowing, most  pronounced at C2-C3 and C6-C7. Overall, degenerative findings appear  similar to prior CT 9/22/2021.    Visualized paraspinous tissues: Unremarkable.      Impression    IMPRESSION:   1. New subtle acute lucent fracture line through the superior right T2  endplate. Subtle loss of T2 vertebral body height on the right. No  posterior displacement of the fractured T2 vertebral body towards the  spinal canal. This fracture was not present on prior CT 9/22/2021.  2. No other fractures appreciated within the cervical spinal vertebral  bodies.  3. Extensive spinal fusion hardware in the cervical spine from C3 to  C6. Surgical hardware appears intact and in similar alignment to  prior. No evidence of loosening of the hardware.  4. Degenerative changes in the cervical spine as detailed above, most  pronounced at C2-C3 and C6-C7. These appear fairly similar to prior CT  9/22/2021.    Results discussed with Luis M Sosa on 1/12/2022 at 10:39 AM    AGUSTO JOVEL MD         SYSTEM ID:  [id number]   XR Chest 1 View    Narrative    CHEST ONE VIEW   1/12/2022 10:29 AM     HISTORY: Fall/pain.    COMPARISON: Chest CT on 9/22/2021.      Impression    IMPRESSION: Single AP view of the chest was obtained.  Cardiomediastinal silhouette is within normal limits. No suspicious  focal pulmonary opacities. No significant pleural effusion or  pneumothorax. Post surgical changes of lower cervical spine.  Multilevel degenerative changes of the spine. No suspicious osseous  lesion. If clinical suspicion of rib  fractures, remains high, rib  series is more sensitive for detection of subtle rib fractures.    KANNAN BRITO MD         SYSTEM ID:  [id number]   XR Pelvis and Hip Left 1 View    Narrative    XR PELVIS AND HIP LEFT 1 VIEW 1/12/2022 10:30 AM     HISTORY: L hip pain    COMPARISON: None.      Impression    IMPRESSION: Bipolar left hip arthroplasty with cerclage wire.  Components are well seated. Cannulated screw fixation across an old  healed fracture of the right femoral neck. Osteopenia. Degenerative  changes in the right hip, both SI joints and lower lumbar spine.    ISHMAEL GRADY MD         SYSTEM ID:  [id number]   CT Thoracic Spine w/o Contrast    Narrative    CT THORACIC SPINE WITHOUT CONTRAST  1/12/2022 11:38 AM     HISTORY: Spine fracture, thoracic, traumatic.     TECHNIQUE: Axial images of the thoracic spine were obtained without  intravenous contrast. Multiplanar reformations were performed.   Radiation dose for this scan was reduced using automated exposure  control, adjustment of the mA and/or kV according to patient size, or  iterative reconstruction technique.    COMPARISON: None.    FINDINGS:  Exaggerated thoracic kyphosis.    Acute-appearing lucent fracture line through the right aspect of the  T2 vertebral body. The fracture line involves the superior endplate.  There is mild loss of T2 vertebral body height on the right. This  fracture is also seen on cervical spine CT earlier today.    Loss of 25% T5 vertebral body height with sclerosis throughout the T5  vertebral body. This could represent a more subacute fracture.    Acute-appearing lucent fracture lines through the posterior spinous  processes of T6 and T7 (sagittal images 46 and 47 and axial image 71).  These may be more acute in age.    Fracture deformity of the anterior left fourth rib (axial image 106).  Subtle bony bridging around this fracture suggesting that it is more  subacute in age. Chronic-appearing fracture deformities of  the  posterior left eighth and ninth ribs.    Mild to moderate degenerative endplate changes and loss of disc height  throughout the thoracic spine. Calcified disc herniation at T10-T11.  No significant spinal canal narrowing in the thoracic spine. Facets  appear fused in the midthoracic spine, particularly from T4 through  T6. Moderate facet hypertrophy in the upper thoracic spine, right  greater than left, particularly at T2-T3 and T3-T4 causing at least  moderate neural foraminal narrowings.    Visualized paraspinous soft tissues are unremarkable.      Impression    IMPRESSION:     1. Acute-appearing lucent fracture line through the T2 vertebral body  involving the superior right endplate with slight loss of T2 vertebral  body height. This fracture was also seen on cervical spine CT from the  same day.  2. Fracture of the T5 vertebral body with sclerosis throughout this  vertebral body and loss of approximately 25% vertebral body height.  This may be more subacute in age given its sclerosis.  3. Lucent fracture lines of the posterior spinous processes of T6 and  T7 which are at least moderately displaced. These may be acute or  subacute as no surrounding bony bridging is appreciated.  4. Subacute-appearing fracture of the anterior left fourth rib which  is displaced but has mild surrounding bony bridging.  5. Chronic-appearing fracture deformities of the posterior left eighth  and ninth ribs.  6. Degenerative changes in the thoracic spine as detailed above.    AGUSTO JOVEL MD         SYSTEM ID:  [id number]   Cervical spine MRI w/o contrast    Narrative    MRI CERVICAL SPINE WITHOUT CONTRAST 1/12/2022 3:19 PM     HISTORY: Neck trauma, spinal or nerve root injury. Pain. Weakness.  Known fracture.    TECHNIQUE: Multiplanar, multisequence MRI of the cervical spine  without contrast.     COMPARISON: Cervical spine CT from earlier the same day.     FINDINGS: Extensive spinal fusion hardware throughout the cervical  spine  extending from C3 through C6. Metal hardware causes artifact and  limits evaluation in those areas. Cervical spine alignment is grossly  within normal limits. No obvious edema or masslike lesions appreciated  within the visualized cervical vertebral bodies noting evaluation  given the metal artifact.    At C2-C3, there is marked degenerative endplate changes and loss of  disc height. Circumferential disc bulge with prominent endplate  osteophytic spurring. There is also prominent facet hypertrophy at  this level. This results in moderate spinal canal narrowing as well as  severe bilateral neural foraminal narrowing.    At C6-C7, there is marked loss of disc height with degenerative  endplate changes. Circumferential disc bulge with endplate osteophytic  spurring. Normal facets. Mild-to-moderate spinal canal narrowing.  Moderate to severe bilateral neural foraminal narrowing.    No abnormal T2 signal or edema appreciated within the cervical spinal  cord. No abnormal fluid collections appreciated along the cervical  spinal canal.    Please see dedicated thoracic spine MRI report for details of the  fracture in the T2 vertebral body.    Paraspinous soft tissues are unremarkable.       Impression    IMPRESSION:    1. Extensive spinal fusion hardware in the cervical spine from C3 to  C6. This metal hardware causes artifact and limits evaluation by MRI.  2. No abnormal edema within the cervical vertebral bodies to suggest  acute fracture.  3. Marked degenerative changes in the cervical spine at C2-3 and C6-7  as detailed above. There is moderate spinal canal narrowing at C2-3  and mild to moderate spinal canal narrowing at C6-7.  4. No abnormal edema appreciated in the cervical spinal cord.  5. Please see dedicated thoracic spine MRI report for details of the  T2 vertebral body fracture.      AGUSTO JOVEL MD         SYSTEM ID:  [id number]   Thoracic spine MRI w/o contrast    Narrative    MRI THORACIC SPINE WITHOUT CONTRAST   1/12/2022 3:21 PM     HISTORY: Spine fracture, thoracic, traumatic. Pain. Weakness.    TECHNIQUE: Multiplanar multisequence MRI of the thoracic spine without  contrast.    COMPARISON: Thoracic spine CT from earlier the same day.    FINDINGS:  Exaggerated kyphotic curvature of the thoracic spine.    Acute appearing fracture of the T2 vertebral body with edema and  fracture line seen at the superior right endplate. Mild loss of T2  vertebral body height. This fracture is better depicted on recent CT  scan.    Fracture of the T5 vertebral body with mild loss of T5 vertebral body  height. There is no significant edema appreciated within the T5  vertebral body and this may be more subacute to chronic in age. Slight  posterior buckling of the mid aspect of the T5 vertebral body towards  the spinal canal without significant spinal canal narrowing.    Known fractures involving the posterior spinous processes of T6 and T7  were much better appreciated by MR. Questionable edema within the  fractured fragments on the sagittal STIR images (series 5 image 12).  These may be more acute to subacute in age.    No other significant loss of vertebral body height. Several small  presumed Schmorl's node deformities in the thoracic spine. T2 and  mildly T1 hyperintense lesion in the T12 vertebral body is favored  represent a venous vascular malformation (previously termed  hemangioma).    Moderate degenerative endplate changes and loss of disc height at  T2-T3 where there is a right foraminal disc protrusion. Right greater  than left facet hypertrophy in the upper thoracic spine. Moderate to  severe right neural foraminal narrowings at T2-3 and T3-4.    No significant spinal canal narrowing at any level. No abnormal T2  signal or edema appreciated in the thoracic spinal cord. No abnormal  fluid collection appreciated within the thoracic spinal canal.     Paraspinous soft tissues are unremarkable.      Impression    IMPRESSION:    1.  Acute to subacute appearing fracture of the T2 vertebral body which  demonstrates edema in the vertebral body. This fracture was better  depicted on recent thoracic spine CT report.  2. No significant edema in the fractured T5 vertebral body which  demonstrates mild loss of vertebral body height. This is favored to be  more subacute to chronic in age given the lack of edema.  3. Question mild edema within the fractured posterior spinous  processes of T6 and T7. Again these were better depicted on thoracic  spine CT.  4. No abnormal edema within the spinal cord. No significant spinal  canal narrowing in the thoracic spine.  5. Degenerative changes in the upper thoracic spine as detailed above  particularly at T2-3 and T3-4 which cause moderate to severe right  neural foraminal narrowings.      AGUSTO JOVEL MD         SYSTEM ID:  [id number]   XR Humerus Left G/E 2 Views    Narrative    EXAM: XR HUMERUS LEFT G/E 2 VIEWS  LOCATION: [facility]  DATE/TIME: 1/12/2022 5:19 PM    INDICATION: Left humerus pain with peripheral nerve weakness in left wrist dorsiflexion.  COMPARISON: 07/01/2021.      Impression    IMPRESSION: Left upper arm negative for fracture or bone lesion. No evidence of glenohumeral or elbow joint alignment. Generalized demineralization.         Ella Hinojosa PA-C

## 2022-01-13 NOTE — ED NOTES
Pure wick changed, brief adjusted.     Old IV removed and new IV placed in different location per pt request.

## 2022-01-13 NOTE — CONSULTS
Care Management Initial Consult    General Information  Assessment completed with: Pablo Rutledge  Type of CM/SW Visit: Initial Assessment    Primary Care Provider verified and updated as needed: Yes   Readmission within the last 30 days: no previous admission in last 30 days   Return Category: Exacerbation of disease  Reason for Consult: care coordination/care conference,discharge planning  Advance Care Planning: Advance Care Planning Reviewed: no concerns identified        Communication Assessment  Patient's communication style: spoken language (English or Bilingual)    Hearing Difficulty or Deaf: yes   Wear Glasses or Blind: no    Cognitive  Cognitive/Neuro/Behavioral: WDL  Level of Consciousness: alert  Arousal Level: opens eyes spontaneously  Orientation: oriented x 4  Mood/Behavior: calm,cooperative  Best Language: 0 - No aphasia  Speech: clear,spontaneous,logical    Living Environment:   People in home: facility resident     Current living Arrangements: assisted living  Name of Facility: [facility]   Able to return to prior arrangements: yes     Family/Social Support:  Care provided by: self,homecare agency,other (see comments) (staff)  Provides care for: no one, unable/limited ability to care for self  Marital Status:   Facility resident(s)/Staff          Description of Support System: Supportive,Involved       Current Resources:   Patient receiving home care services: No (Will start after discharge)     Community Resources:    Equipment currently used at home: grab bar, toilet,grab bar, tub/shower,shower chair,walker, standard  Supplies currently used at home:      Employment/Financial:  Employment Status: retired        Financial Concerns: No concerns identified     Lifestyle & Psychosocial Needs:  Social Determinants of Health     Tobacco Use: Not on file   Alcohol Use: Not on file   Financial Resource Strain: Not on file   Food Insecurity: Not on file   Transportation Needs: Not on file    Physical Activity: Not on file   Stress: Not on file   Social Connections: Not on file   Intimate Partner Violence: Not on file   Depression: Not on file   Housing Stability: Not on file     Functional Status:  Prior to admission patient needed assistance:   Dependent ADLs:: Ambulation-walker,Bathing,Eating  Dependent IADLs:: Cleaning,Cooking,Laundry,Shopping,Meal Preparation,Medication Management,Transportation     Mental Health Status:  Mental Health Status: No Current Concerns       Chemical Dependency Status:  Chemical Dependency Status: No Current Concerns           Values/Beliefs:  Spiritual, Cultural Beliefs, Spiritism Practices, Values that affect care: no             Additional Information:  Patient admitted for fall with thoracic vertebrae fractures. She does not have  an unplanned readmission risk as she's Observation status. CM met with patient at bedside. Introduced self and role. Verified she lives in [facility] She gets 3 meals/day, medication administration, showers 3 times/week & has a call pendant. She previously received [facility] but was discharged from their service on Nov. 29, 2021. They are willing to accept her back. She uses a standard walker and has grab bars for her toilet and shower. PT recommends return to [facility] with increased safety checks and resumption of home care for RN, OT & PT. Patient is agreeable to this. She would like [facility] for her therapist. CM contacted patient daughter,  Maye ([phone]). She is aware of discharge plan for her mom. Patient should discharge tomorrow, Friday, 1/14/22 back to [facility]. Referral sent to [facility] for RN, OT & PT. AVS updated.     RN CC will continue to follow for discharge planning.    Angella David RN, BSN, CPHN, CM  Inpatient Care Coordination - [facility]  [phone]    Addendum 3:13pm - [facility] told patient's daughter they couldn't take patient back since she keeps falling.  They are unable to keep her safe. Maye asked CM to send out referrals for TCUs. Maye & her  will start looking for a LTC facility for patient. Referrals sent.    Donnell    Addendum 3:35pm - CM received call from [facility] re: discharge plan for patient. Updated Jillian at [facility] that patient will NOT be returning to [facility] due their refusing to take patient home. They feel they cannot keep her safe. CM recommended [facility] close the referral for now. If things change again, we will reinitiate a referral. Patient's daughter & RUDOLPH will be looking for LTC for patient and she requested CM find a TCU for her in the mean time. Referrals sent.   donnell

## 2022-01-13 NOTE — PROGRESS NOTES
"   01/13/22 1150   Quick Adds   Quick Adds Certification   Type of Visit Initial PT Evaluation   Living Environment   People in home alone   Current Living Arrangements assisted living   Home Accessibility no concerns   Living Environment Comments lives in [facility] with accessible environment, uses 4WW for ambualtion, walk in shower with seat   Self-Care   Usual Activity Tolerance moderate   Current Activity Tolerance fair   Regular Exercise No   Equipment Currently Used at Home shower chair;grab bar, tub/shower;grab bar, toilet;walker, rolling   Activity/Exercise/Self-Care Comment Has assist for bathing and dressing, pt normally independent with 4WW in apt, endorses frequent falling. has been to TCU and had home care many times.    Disability/Function   Fall history within last six months yes   Number of times patient has fallen within last six months 4   General Information   Onset of Illness/Injury or Date of Surgery 01/12/22   Referring Physician Ella Hinojosa PA-C   Patient/Family Therapy Goals Statement (PT) return home with home PT, be able to stand up straighter    Pertinent History of Current Problem (include personal factors and/or comorbidities that impact the POC) 76 year old female with a PMH significant for Parkinson's disease, BPV, hypertension, GERD, hypertension and osteoarthritis, osteoporosis who presents after a unwitnessed fall with complaints of back pain. Fracture of T2, T5 found with  brace recommended.    Existing Precautions/Restrictions spinal;brace worn when out of bed   Weight-Bearing Status - LLE full weight-bearing   Weight-Bearing Status - RLE full weight-bearing   General Observations pt unable to extend left wrist and left fingers d/t pt reports of \"being tied up and stuck in one position on Sunday\"   Cognition   Orientation Status (Cognition) oriented x 3   Affect/Mental Status (Cognition) WFL   Follows Commands (Cognition) WFL   Cognitive Status Comments forgetful and " loses train of thought quickly at times    Pain Assessment   Patient Currently in Pain Yes, see Vital Sign flowsheet  (low back pain per patient and left groin pain )   Strength   Strength Comments has left wrist drop - unable to extend left wrist or left fingers - 0/5 MMT noted   Bed Mobility   Comment (Bed Mobility) Mod A supine <> sit via log roll technique with  on    Transfers   Transfer Safety Comments Min A sit <> sit and with 4WW   Gait/Stairs (Locomotion)   [place] Level (Gait) minimum assist (75% patient effort)   Assistive Device (Gait) walker, 4-wheeled   Distance in Feet (Required for LE Total Joints) 60' x 2    Comment (Gait/Stairs) narrowed THERESA, assist needed to keep 4WW close    Clinical Impression   Criteria for Skilled Therapeutic Intervention yes, treatment indicated   PT Diagnosis (PT) impaired mobility    Influenced by the following impairments generalized weakness, impaired balance, left wrist/hand weakness, pain, orthopedic restrictions    Functional limitations due to impairments fall risk, decreased activity tolerance    Clinical Presentation Stable/Uncomplicated   Clinical Presentation Rationale clinical judgement    Clinical Decision Making (Complexity) low complexity   Therapy Frequency (PT) Daily   Predicted Duration of Therapy Intervention (days/wks) 7 days    Planned Therapy Interventions (PT) balance training;bed mobility training;gait training;home exercise program;patient/family education;strengthening;stair training;transfer training   Anticipated Equipment Needs at Discharge (PT) walker, rolling;wheelchair   Risk & Benefits of therapy have been explained evaluation/treatment results reviewed;care plan/treatment goals reviewed;risks/benefits reviewed;current/potential barriers reviewed;participants voiced agreement with care plan;participants included;patient   PT Discharge Planning    PT Discharge Recommendation (DC Rec) home with assist;home with home care physical  therapy;Transitional Care Facility   PT Rationale for DC Rec Pt hoping to return home to half-way with continued staff support and home PT, this is possible however patient was and continues to be high fall risk which should be noted. Pt will likely d/c home with  brace and recommend 4WW use at all times, may benefit from A x 1 with all mobility to decreased fall risk at half-way. If half-way feels they can not support patient needs, could consider TCU to improve patient's strength and balance prior to returning home. Pt has life alert button for falls and has ongoing recurrent falls at home. Pt's left wrist and fingers are extremely weak and would benefit from home OT.    PT Brief overview of current status  Mod A bed mob, Min A standing and walking with 4WW, high fall risk    Therapy Certification   Start of care date 01/13/22   Certification date from 01/13/22   Certification date to 01/20/22   Medical Diagnosis fall resulting in Tspine fractures    Total Evaluation Time   Total Evaluation Time (Minutes) 15

## 2022-01-13 NOTE — PROGRESS NOTES
Per Pharmacist, Jay Jay Rios--give CR sinemet and ask pt if she wants to take IR sinemet since medications are given late. Pt stated she will wait till morning to take IR dose Will monitor.

## 2022-01-13 NOTE — PLAN OF CARE
[facility]      OUTPATIENT PHYSICAL THERAPY EVALUATION  PLAN OF TREATMENT FOR OUTPATIENT REHABILITATION  (COMPLETE FOR INITIAL CLAIMS ONLY)  Patient's Last Name, First Name, M.I.  YOB: 1945  Pablo Baez                        Provider's Name  [facility] Medical Record No.  [id number]                               Onset Date:  01/12/22   Start of Care Date:  01/13/22      Type:     _X_PT   ___OT   ___SLP Medical Diagnosis:  fall resulting in Tspine fractures                         PT Diagnosis:  impaired mobility    Visits from SOC:  1   _________________________________________________________________________________  Plan of Treatment/Functional Goals    Planned Interventions: balance training,bed mobility training,gait training,home exercise program,patient/family education,strengthening,stair training,transfer training     Goals: See Physical Therapy Goals on Care Plan in [facility] electronic health record.    Therapy Frequency: Daily  Predicted Duration of Therapy Intervention: 7 days   _________________________________________________________________________________    I CERTIFY THE NEED FOR THESE SERVICES FURNISHED UNDER        THIS PLAN OF TREATMENT AND WHILE UNDER MY CARE     (Physician co-signature of this document indicates review and certification of the therapy plan).              Certification date from: 01/13/22, Certification date to: 01/20/22    Referring Physician: Ella Hinojosa PA-C            Initial Assessment        See Physical Therapy evaluation dated 01/13/22 in Epic electronic health record.

## 2022-01-13 NOTE — PROGRESS NOTES
01/13/22 1448   General Information   Onset of Illness/Injury or Date of Surgery 01/12/22   Referring Physician Reyna Foster PA-C   Patient/Family Therapy Goal Statement (SLP) None stated    Pertinent History of Current Problem Pablo Baez is a 76 year old female with a PMH significant for Parkinson's disease, BPV, hypertension, GERD, hypertension and osteoarthritis, osteoporosis who presents after a unwitnessed fall with complaints of back pain.    General Observations Pt is alert and agreeable to evaluation    Past History of Dysphagia Pt has had dysphagia in the past. Per care everywhere pt was evaluated with video swallows in 2017 following cervical fusion. Most recent of the video swallows at that time was without aspiration and a regular diet and thin liquids were recommended. Pt recalls being recommended to not use straws    Type of Evaluation   Type of Evaluation Swallow Evaluation   Oral Motor   Oral Musculature anomalies present   Structural Abnormalities none present   Mucosal Quality adequate   Dentition (Oral Motor)   Dentition (Oral Motor) adequate dentition;some missing teeth   Facial Symmetry (Oral Motor)   Facial Symmetry (Oral Motor)   (reduced coordination )   Lip Function (Oral Motor)   Lip Range of Motion (Oral Motor)   (reduced coordination )   Tongue Function (Oral Motor)   Tongue ROM (Oral Motor)   (reduced coordination)   Vocal Quality/Secretion Management (Oral Motor)   Vocal Quality (Oral Motor) WFL   Secretion Management (Oral Motor) WNL   General Swallowing Observations   Current Diet/Method of Nutritional Intake (General Swallowing Observations, NIS) thin liquids (level 0)  (mechanical dental)   Respiratory Support (General Swallowing Observations) none   Swallowing Evaluation Clinical swallow evaluation   Clinical Swallow Evaluation   Feeding Assistance set up only required   Clinical Swallow Evaluation Textures Trialed thin liquids;pureed;solid foods   Clinical  Swallow Eval: Thin Liquid Texture Trial   Mode of Presentation, Thin Liquids cup;straw;self-fed   Volume of Liquid or Food Presented 4 oz    Oral Phase of Swallow premature pharyngeal entry   Pharyngeal Phase of Swallow impaired;coughing/choking   Diagnostic Statement Overt cough on initial swallow from a straw, throat clear on an additional trial from a straw. No overt s/sx of aspiration with cup sips of thin liquids with an upright position.   Clinical Swallow Evaluation: Puree Solid Texture Trial   Mode of Presentation, Puree spoon;self-fed   Volume of Puree Presented 2 oz   Oral Phase, Puree WFL   Pharyngeal Phase, Puree intact   Diagnostic Statement Adequate oral manipulation and clearance, no overt s/sx of aspiration   Clinical Swallow Evaluation: Solid Food Texture Trial   Mode of Presentation self-fed   Volume Presented 3 crackers   Oral Phase WFL   Pharyngeal Phase intact   Diagnostic Statement Adequate oral manipulation and clearance, no overt s/sx of aspiration. Pt denies any difficulty, pain, or globus sensation. Pt reports some dificulty at baseline with certain bread items and stringy meats.    Esophageal Phase of Swallow   Patient reports or presents with symptoms of esophageal dysphagia No   Swallowing Recommendations   Diet Consistency Recommendations easy to chew (level 7);thin liquids (level 0)   Supervision Level for Intake distant supervision needed   Mode of Delivery Recommendations bolus size, small;food moistened;no straws;slow rate of intake   Swallowing Maneuver Recommendations alternate food and liquid intake;extra swallow   Monitoring/Assistance Required (Eating/Swallowing) check mouth frequently for oral residue/pocketing;cue for finger/lingual sweep if oral pocketing present;stop eating activities when fatigue is present;monitor for cough or change in vocal quality with intake   Recommended Feeding/Eating Techniques (Swallow Eval) maintain upright sitting position for eating;maintain  upright posture during/after eating for 30 minutes;minimize distractions during oral intake;provide oral hygiene prior to intake   Medication Administration Recommendations, Swallowing (SLP) As per pt preference, place in puree as needed   Instrumental Assessment Recommendations instrumental evaluation not recommended at this time  (may benefit from one in the future)   General Therapy Interventions   Planned Therapy Interventions Dysphagia Treatment   Dysphagia treatment Modified diet education;Instruction of safe swallow strategies   SLP Therapy Assessment/Plan   Criteria for Skilled Therapeutic Interventions Met (SLP Eval) yes;treatment indicated   SLP Diagnosis Oropharyngeal dysphagia   Rehab Potential (SLP Eval) good, to achieve stated therapy goals   Therapy Frequency (SLP Eval) 5 times/wk   Predicted Duration of Therapy Intervention (SLP Eval) 1 week   Comment, Therapy Assessment/Plan (SLP) Pt seen for clinical swallow evaluation. She consumed puree and regular solids with adequate mastication and manipulation, sufficient oral clearance, and without overt s/sx of aspiration. Pt denied any difficulty, pain, or globus sensation. She also consumed thin liquids by cup and straw sip. Overt cough on initial straw sip and throat clear x 1 on additional straw sips. Suspect primarily d/t premature spillage and incoordination in swallow function. Improvement noted with cup sips of thin liquids, no overt s/sx of aspiration, no changes in vocal quality or breath sounds. Oropharyngeal dysphagia likely chronic given hx of ACDF and Parkinson's disease. Recommend easy to chew diet (7) and thin liquids. Pt must be completely upright, avoid using straws, small bites/sips, slow rate. Initiate SLP services for dysphagia and education of strategies.    Therapy Plan Review/Discharge Plan (SLP)   Therapy Plan Review (SLP) evaluation/treatment results reviewed;care plan/treatment goals reviewed;risks/benefits reviewed;participants  voiced agreement with care plan;participants included;patient   Demonstrates Need for Referral to Another Service (SLP) clinical nutrition services/dietitian   SLP Discharge Planning    SLP Discharge Recommendation (DC Rec) home with home care speech therapy;Transitional Care Facility   SLP Rationale for DC Rec Dysphagia, likely near baseline but requires ongoing assessment and education    SLP Brief overview of current status  Oropharyngeal dysphagia likely chronic given hx of ACDF and Parkinson's disease. Recommend easy to chew diet (7) and thin liquids. Pt must be completely upright, avoid using straws, small bites/sips, slow rate. Initiate SLP services for dysphagia and education of strategies.     Total Evaluation Time   Total Evaluation Time (Minutes) 12

## 2022-01-13 NOTE — PROGRESS NOTES
[facility]    Hospitalist Progress Note  Assessment & Plan   Pablo Baez is a 76 year old right hand dominant female with past medical history significant for BPPV, cervical spondylosis with myelopathy and cervical fusion, Parkinson's Disease, who was admitted on 1/12/2022 after multiple reported unwitnessed falls at assisted living.   She c/o mid back pain, found to have multiple subacute v acute fractures. Imaging demonstrated subtle fracture through T2 endplate, T5 vertebral body (subacute?) possible acute lucent fractures through the spinous processes of T6/T7 prior spinal fusion hardware in the cervical spine along with degenerative changes. Fortunately without abnormal edema within the spinal cord, or significant spinal canal narrowing in the thoracic spine. She was admitted to observation for further cares and monitoring. Neurosurgery was consulted from the emergency department who did not recommend any bracing and outpatient follow-up to reimage in 6 weeks.       Ms. Baez was assessed by physical therapy and Occupational Therapy.       Unwitnessed Fall, History of Multiple Falls  Thoracic Spine Fractures   Admitted after ~8 falls since December. Recurrent issue for patient. Fall on 1/11 (not evaluated, [facility]) and 1/12 when staff at AL were not permitted to get patient up and pt had reported being unable to get off the floor for hours, lying on her left side.   - Per documented Neurosurgery recommendations in the ED, placed in a thoracic brace and can follow-up with neurosurgery in 6  weeks.  - Anticipate discharge back to assisted living with increased safety checks, home PT and OT    Parkinson's disease  [facility] Dr. Hinojosa.  Diagnosed in 2018. Has history of hallucinations with this, insomnia, difficulty with oral secretions, intermittent oral dyskinesia, gait instability    - Continue Seroquel, 25 mg at bedtime.  Given concern for  "lethargy and contribution to falls reduced to 12.5 mg at bedtime.  May need to titrate back up to previous dose if insomnia and hallucinations again become a problem  - resume sinemet at previous schedule  - Will need close outpatient neurology follow-up  - soft diet, SLP consult    Left Wrist Weakness, Paraesthesias  By exam concerning for possible peripheral nerve injury from prior fall  No humerus fracture. Concern for cervical disease v peripheral nerve injury. Thought to precede fall on 1/12 when she was on her left side. Appears NSG reviewed MRI and symptoms are thought not to be c/w with cervical radiculopathy.   - Conservative treatment at this time recommended -.therapy, wrist splinting (to maintain function), and pain management    - follow-up examinations and electromyography (EMG) may be warranted     Cognitive Impairment   Mild generalized volume loss and presumed chronic small vessel ischemic change on NCHCT.   - SLUMS this admission of 21/30  - OT    DVT Prophylaxis: Enoxaparin (Lovenox) SQ   Code Status: Full Code  Expected Discharge: 1/15/2022   Anticipated discharge location: back to AL with increased safety checks and [facility]    Reyna Foster PA-C      Interval History   Assumed care of patient.  Denies any pain.  She was noted to have difficulty chewing her lunch, difficulty swallowing a small piece of turkey reported by nurse.  She denies back pain at rest or pain in her neck or upper extremities.  She reports some paresthesias in the left lateral aspect of her forearm.  She is under sure of when this started but stated that it was after someone \"tied her up \"at assisted living.    With permission contacted the patient's daughter, Sania over the phone today.  She expresses concern regarding the patient's recurrent falls and lack of insight into recognizing when she needs assistance at her assisted living facility.  I gained collateral history from the patient's daughter regarding her recurrent " "falls.  She has been reported to have 8-10 falls at her assisted living since December.  They recently had a care conference there regarding fall prevention.  Sania states this is a \"never ending cycle\" for the patient.  They have discussed decreasing her Seroquel dose or changing her medication at night to help with insomnia although the patient is then bothered by hallucinations.    In terms of her most recent fall history the first fall was sustained on Tuesday night where the patient had a closed head injury.  On Wednesday morning there was a reported unwitnessed fall where the patient stated to staff she had been on the floor for hours on her left side although the history surrounding this is unclear.  The daughter is unsure of when the patient developed weakness and paresthesias in her left forearm but suspected within the past 3 days.     -Data reviewed today: I reviewed all new labs and imaging results over the last 24 hours.   Physical Exam   Temp: 98.6  F (37  C) Temp src: Axillary BP: 108/89 Pulse: 83   Resp: 16 SpO2: 94 % O2 Device: None (Room air)    Vitals:    01/12/22 2316   Weight: 51.7 kg (114 lb)     Vital Signs with Ranges  Temp:  [97.6  F (36.4  C)-98.8  F (37.1  C)] 98.6  F (37  C)  Pulse:  [77-83] 83  Resp:  [16-18] 16  BP: (108-169)/(71-89) 108/89  SpO2:  [92 %-95 %] 94 %  I/O last 3 completed shifts:  In: -   Out: 300 [Urine:300]      Constitutional:Awake, alert, no apparent distress  Respiratory:  Normal work of breathing. Lungs clear to auscultation bilaterally, no crackles or wheezing.  Cardiovascular: Regular rate and rhythm, normal S1 and S2, and no murmur appreciated.   GI: Normal bowel sounds, soft, non-distended, non-tender.   MK: No gross deformities.  Reports discomfort extending from wrist to elbow from left lateral fourth and fifth digits, reduced hand  strength on left   skin/Integument: No rashes, no cyanosis, no peripheral edema.  Neuro: Moving all extremities with normal " strength. Coordination and sensation grossly intact. Speech clear. No focal deficits.   Psych: Appropriate affect.      Medications       acetaminophen  975 mg Oral TID     carbidopa-levodopa  2 tablet Oral At Bedtime     carbidopa-levodopa  2 tablet Oral BID     carbidopa-levodopa  3 half-tab Oral BID     enoxaparin ANTICOAGULANT  40 mg Subcutaneous At Bedtime     glycopyrrolate  1 mg Oral QAM     QUEtiapine  25 mg Oral At Bedtime       Data   Recent Labs   Lab 01/12/22  0908   WBC 7.1   HGB 13.2   *         POTASSIUM 4.2   CHLORIDE 108   CO2 26   BUN 12   CR 0.52   ANIONGAP 5   GIOVANI 9.6   *       Recent Results (from the past 24 hour(s))   CT Thoracic Spine w/o Contrast    Narrative    CT THORACIC SPINE WITHOUT CONTRAST  1/12/2022 11:38 AM     HISTORY: Spine fracture, thoracic, traumatic.     TECHNIQUE: Axial images of the thoracic spine were obtained without  intravenous contrast. Multiplanar reformations were performed.   Radiation dose for this scan was reduced using automated exposure  control, adjustment of the mA and/or kV according to patient size, or  iterative reconstruction technique.    COMPARISON: None.    FINDINGS:  Exaggerated thoracic kyphosis.    Acute-appearing lucent fracture line through the right aspect of the  T2 vertebral body. The fracture line involves the superior endplate.  There is mild loss of T2 vertebral body height on the right. This  fracture is also seen on cervical spine CT earlier today.    Loss of 25% T5 vertebral body height with sclerosis throughout the T5  vertebral body. This could represent a more subacute fracture.    Acute-appearing lucent fracture lines through the posterior spinous  processes of T6 and T7 (sagittal images 46 and 47 and axial image 71).  These may be more acute in age.    Fracture deformity of the anterior left fourth rib (axial image 106).  Subtle bony bridging around this fracture suggesting that it is more  subacute in age.  Chronic-appearing fracture deformities of the  posterior left eighth and ninth ribs.    Mild to moderate degenerative endplate changes and loss of disc height  throughout the thoracic spine. Calcified disc herniation at T10-T11.  No significant spinal canal narrowing in the thoracic spine. Facets  appear fused in the midthoracic spine, particularly from T4 through  T6. Moderate facet hypertrophy in the upper thoracic spine, right  greater than left, particularly at T2-T3 and T3-T4 causing at least  moderate neural foraminal narrowings.    Visualized paraspinous soft tissues are unremarkable.      Impression    IMPRESSION:     1. Acute-appearing lucent fracture line through the T2 vertebral body  involving the superior right endplate with slight loss of T2 vertebral  body height. This fracture was also seen on cervical spine CT from the  same day.  2. Fracture of the T5 vertebral body with sclerosis throughout this  vertebral body and loss of approximately 25% vertebral body height.  This may be more subacute in age given its sclerosis.  3. Lucent fracture lines of the posterior spinous processes of T6 and  T7 which are at least moderately displaced. These may be acute or  subacute as no surrounding bony bridging is appreciated.  4. Subacute-appearing fracture of the anterior left fourth rib which  is displaced but has mild surrounding bony bridging.  5. Chronic-appearing fracture deformities of the posterior left eighth  and ninth ribs.  6. Degenerative changes in the thoracic spine as detailed above.    AGUSTO JOVEL MD         SYSTEM ID:  [email or web address]   Cervical spine MRI w/o contrast    Narrative    MRI CERVICAL SPINE WITHOUT CONTRAST 1/12/2022 3:19 PM     HISTORY: Neck trauma, spinal or nerve root injury. Pain. Weakness.  Known fracture.    TECHNIQUE: Multiplanar, multisequence MRI of the cervical spine  without contrast.     COMPARISON: Cervical spine CT from earlier the same day.     FINDINGS: Extensive spinal  fusion hardware throughout the cervical  spine extending from C3 through C6. Metal hardware causes artifact and  limits evaluation in those areas. Cervical spine alignment is grossly  within normal limits. No obvious edema or masslike lesions appreciated  within the visualized cervical vertebral bodies noting evaluation  given the metal artifact.    At C2-C3, there is marked degenerative endplate changes and loss of  disc height. Circumferential disc bulge with prominent endplate  osteophytic spurring. There is also prominent facet hypertrophy at  this level. This results in moderate spinal canal narrowing as well as  severe bilateral neural foraminal narrowing.    At C6-C7, there is marked loss of disc height with degenerative  endplate changes. Circumferential disc bulge with endplate osteophytic  spurring. Normal facets. Mild-to-moderate spinal canal narrowing.  Moderate to severe bilateral neural foraminal narrowing.    No abnormal T2 signal or edema appreciated within the cervical spinal  cord. No abnormal fluid collections appreciated along the cervical  spinal canal.    Please see dedicated thoracic spine MRI report for details of the  fracture in the T2 vertebral body.    Paraspinous soft tissues are unremarkable.       Impression    IMPRESSION:    1. Extensive spinal fusion hardware in the cervical spine from C3 to  C6. This metal hardware causes artifact and limits evaluation by MRI.  2. No abnormal edema within the cervical vertebral bodies to suggest  acute fracture.  3. Marked degenerative changes in the cervical spine at C2-3 and C6-7  as detailed above. There is moderate spinal canal narrowing at C2-3  and mild to moderate spinal canal narrowing at C6-7.  4. No abnormal edema appreciated in the cervical spinal cord.  5. Please see dedicated thoracic spine MRI report for details of the  T2 vertebral body fracture.      AGUSTO JOVEL MD         SYSTEM ID:  [email or web address]   Thoracic spine MRI w/o contrast     Narrative    MRI THORACIC SPINE WITHOUT CONTRAST  1/12/2022 3:21 PM     HISTORY: Spine fracture, thoracic, traumatic. Pain. Weakness.    TECHNIQUE: Multiplanar multisequence MRI of the thoracic spine without  contrast.    COMPARISON: Thoracic spine CT from earlier the same day.    FINDINGS:  Exaggerated kyphotic curvature of the thoracic spine.    Acute appearing fracture of the T2 vertebral body with edema and  fracture line seen at the superior right endplate. Mild loss of T2  vertebral body height. This fracture is better depicted on recent CT  scan.    Fracture of the T5 vertebral body with mild loss of T5 vertebral body  height. There is no significant edema appreciated within the T5  vertebral body and this may be more subacute to chronic in age. Slight  posterior buckling of the mid aspect of the T5 vertebral body towards  the spinal canal without significant spinal canal narrowing.    Known fractures involving the posterior spinous processes of T6 and T7  were much better appreciated by MR. Questionable edema within the  fractured fragments on the sagittal STIR images (series 5 image 12).  These may be more acute to subacute in age.    No other significant loss of vertebral body height. Several small  presumed Schmorl's node deformities in the thoracic spine. T2 and  mildly T1 hyperintense lesion in the T12 vertebral body is favored  represent a venous vascular malformation (previously termed  hemangioma).    Moderate degenerative endplate changes and loss of disc height at  T2-T3 where there is a right foraminal disc protrusion. Right greater  than left facet hypertrophy in the upper thoracic spine. Moderate to  severe right neural foraminal narrowings at T2-3 and T3-4.    No significant spinal canal narrowing at any level. No abnormal T2  signal or edema appreciated in the thoracic spinal cord. No abnormal  fluid collection appreciated within the thoracic spinal canal.     Paraspinous soft tissues are  unremarkable.      Impression    IMPRESSION:    1. Acute to subacute appearing fracture of the T2 vertebral body which  demonstrates edema in the vertebral body. This fracture was better  depicted on recent thoracic spine CT report.  2. No significant edema in the fractured T5 vertebral body which  demonstrates mild loss of vertebral body height. This is favored to be  more subacute to chronic in age given the lack of edema.  3. Question mild edema within the fractured posterior spinous  processes of T6 and T7. Again these were better depicted on thoracic  spine CT.  4. No abnormal edema within the spinal cord. No significant spinal  canal narrowing in the thoracic spine.  5. Degenerative changes in the upper thoracic spine as detailed above  particularly at T2-3 and T3-4 which cause moderate to severe right  neural foraminal narrowings.      AGUSTO JOVEL MD         SYSTEM ID:  [email or web address]   XR Humerus Left G/E 2 Views    Narrative    EXAM: XR HUMERUS LEFT G/E 2 VIEWS  LOCATION: [facility]  DATE/TIME: 1/12/2022 5:19 PM    INDICATION: Left humerus pain with peripheral nerve weakness in left wrist dorsiflexion.  COMPARISON: 07/01/2021.      Impression    IMPRESSION: Left upper arm negative for fracture or bone lesion. No evidence of glenohumeral or elbow joint alignment. Generalized demineralization.

## 2022-01-13 NOTE — PROGRESS NOTES
"   01/13/22 1129   Quick Adds   Type of Visit Initial Occupational Therapy Evaluation   Living Environment   People in home alone   Current Living Arrangements assisted living   Home Accessibility no concerns   Living Environment Comments pt lives in assisted living with no environmental concerns. walk in shower with seat, apartment.   Self-Care   Usual Activity Tolerance moderate   Current Activity Tolerance fair   Equipment Currently Used at Home shower chair;grab bar, tub/shower;grab bar, toilet;walker, standard   Activity/Exercise/Self-Care Comment pt reports that she walks with a FWW at baseline around her apartment and toilets herself with extended time. she reports she has assistance for bathing and dressing   Instrumental Activities of Daily Living (IADL)   IADL Comments pt reports facility staff perform all IADL including laundry, medication mgmt, meals, etc.    Disability/Function   Hearing Difficulty or Deaf yes   Wear Glasses or Blind no   Fall history within last six months yes   Number of times patient has fallen within last six months 2  (reports 2 though tells stories of more than 2 )   Change in Functional Status Since Onset of Current Illness/Injury yes   General Information   Onset of Illness/Injury or Date of Surgery 01/12/22   Referring Physician Ella Hinojosa PA-C   Patient/Family Therapy Goal Statement (OT) \"stop falling\"   Additional Occupational Profile Info/Pertinent History of Current Problem Pablo Baez is a 76 year old female with a PMH significant for Parkinson's disease, BPV, hypertension, GERD, hypertension and osteoarthritis, osteoporosis who presents after a unwitnessed fall with complaints of back pain.    General Observations and Info wearing [place] . RN reports that it is not necessary but pt likes it for support. reports she cannot stop falling at home    Cognitive Status Examination   Orientation Status orientation to person, place and time   Affect/Mental " "Status (Cognitive) WFL;confused   Follows Commands follows two-step commands;increased processing time needed;75-90% accuracy;initiation impaired;repetition of directions required   Cognitive Status Comments somewhat tangential with stories, will add unrealistic details. no at risk behavior identified. generally aware of deficits. needs increased time for processing. SLUMS 21/30 indicating mild neurocog disorder (screening). see flowsheet for details.    Visual Perception   Visual Impairment/Limitations unable/difficult to assess   Sensory   Sensory Comments reports numbness and tingling in L UE and hand which is new from \"3 to 4 days ago\"   Pain Assessment   Patient Currently in Pain Yes, see Vital Sign flowsheet   Posture   Posture forward head position;protracted shoulders   Range of Motion Comprehensive   General Range of Motion upper extremity range of motion deficits identified   General Upper Extremity Assessment (Range of Motion)   Comment: Upper Extremity ROM R WFL for ADL though largely 75%. L UE  ROM 25%, limited digit flex/ext in L, ROM in wrist flexion limited to 50% , elbow WFL   Strength Comprehensive (MMT)   Comment, General Manual Muscle Testing (MMT) Assessment L 2/5 at . R 3/5 though    Coordination   Upper Extremity Coordination Left UE impaired   Gross Motor Coordination L impaired   Fine Motor Coordination L impaired    Coordination Comments R somewhat impaired though is typical for diagnosis. L more impaired than R   Bed Mobility   Bed Mobility supine-sit   Supine-Sit [place] (Bed Mobility) moderate assist (50% patient effort)   Activities of Daily Living   BADL Assessment lower body dressing   Lower Body Dressing Assessment   [place] Level (Lower Body Dressing) dependent (less than 25% patient effort)   Clinical Impression   Criteria for Skilled Therapeutic Interventions Met (OT) yes;meets criteria;skilled treatment is necessary   OT Diagnosis decreased function in ADL   OT " Problem List-Impairments impacting ADL problems related to;activity tolerance impaired;balance;cognition;coordination;flexibility;mobility;range of motion (ROM);strength;pain   Assessment of Occupational Performance 3-5 Performance Deficits   Identified Performance Deficits bathing, dressing, home mgmt, mobility, self cares    Planned Therapy Interventions (OT) ADL retraining;IADL retraining;balance training;bed mobility training;ROM;strengthening;transfer training;home program guidelines;progressive activity/exercise   Clinical Decision Making Complexity (OT) low complexity   Therapy Frequency (OT) 5x/week   Predicted Duration of Therapy 5 days   Anticipated Equipment Needs Upon Discharge (OT)   (none)   Risk & Benefits of therapy have been explained evaluation/treatment results reviewed;care plan/treatment goals reviewed;risks/benefits reviewed;current/potential barriers reviewed;participants voiced agreement with care plan;participants included;patient   Comment-Clinical Impression decreased function in ADL warrants skilled IP OT tx    OT Discharge Planning    OT Discharge Recommendation (DC Rec) Home with assist;home with home care occupational therapy;Transitional Care Facility   OT Rationale for DC Rec pt below baseline function in ADL and self cares. has assistance at baseline but currently high fall risk and requiring A of 1 for all mobility and ADL where at baseline she is somewhat indp with mobility. rec return to home if assisted living can provide A of 1 for all mobility with FWW and ADL. if assistance at home not possible then pt may benefit from TCU stay. will continue to monitor progress with skilled IP OT tx   OT Brief overview of current status  SLUMS 21/30, max A ADL, min A for mobility with FWW   Total Evaluation Time (Minutes)   Total Evaluation Time (Minutes) 15

## 2022-01-14 ENCOUNTER — APPOINTMENT (OUTPATIENT)
Dept: OCCUPATIONAL THERAPY | Facility: CLINIC | Age: 77
End: 2022-01-14
Payer: MEDICARE

## 2022-01-14 ENCOUNTER — APPOINTMENT (OUTPATIENT)
Dept: PHYSICAL THERAPY | Facility: CLINIC | Age: 77
End: 2022-01-14
Payer: MEDICARE

## 2022-01-14 PROCEDURE — 97535 SELF CARE MNGMENT TRAINING: CPT | Mod: GO

## 2022-01-14 PROCEDURE — 96372 THER/PROPH/DIAG INJ SC/IM: CPT | Performed by: PHYSICIAN ASSISTANT

## 2022-01-14 PROCEDURE — 250N000013 HC RX MED GY IP 250 OP 250 PS 637: Performed by: HOSPITALIST

## 2022-01-14 PROCEDURE — 250N000011 HC RX IP 250 OP 636: Performed by: PHYSICIAN ASSISTANT

## 2022-01-14 PROCEDURE — G0378 HOSPITAL OBSERVATION PER HR: HCPCS

## 2022-01-14 PROCEDURE — 97530 THERAPEUTIC ACTIVITIES: CPT | Mod: GO

## 2022-01-14 PROCEDURE — 250N000013 HC RX MED GY IP 250 OP 250 PS 637: Performed by: PHYSICIAN ASSISTANT

## 2022-01-14 PROCEDURE — 99226 PR SUBSEQUENT OBSERVATION CARE,LEVEL III: CPT | Performed by: PHYSICIAN ASSISTANT

## 2022-01-14 PROCEDURE — 97530 THERAPEUTIC ACTIVITIES: CPT | Mod: GP | Performed by: PHYSICAL THERAPIST

## 2022-01-14 RX ADMIN — NEOMYCIN SULFATE, POLYMYXIN B SULFATE, AND DEXAMETHASONE: 3.5; 10000; 1 OINTMENT OPHTHALMIC at 22:36

## 2022-01-14 RX ADMIN — ENOXAPARIN SODIUM 40 MG: 40 INJECTION SUBCUTANEOUS at 21:44

## 2022-01-14 RX ADMIN — ACETAMINOPHEN 975 MG: 325 TABLET, FILM COATED ORAL at 07:59

## 2022-01-14 RX ADMIN — LOSARTAN POTASSIUM 25 MG: 25 TABLET, FILM COATED ORAL at 07:59

## 2022-01-14 RX ADMIN — CARBIDOPA AND LEVODOPA 1 HALF-TAB: 25; 100 TABLET ORAL at 07:59

## 2022-01-14 RX ADMIN — ACETAMINOPHEN 975 MG: 325 TABLET, FILM COATED ORAL at 13:54

## 2022-01-14 RX ADMIN — CARBIDOPA AND LEVODOPA 2 TABLET: 25; 100 TABLET ORAL at 16:04

## 2022-01-14 RX ADMIN — CARBIDOPA AND LEVODOPA 1 HALF-TAB: 25; 100 TABLET ORAL at 11:54

## 2022-01-14 RX ADMIN — CARBIDOPA AND LEVODOPA 2 TABLET: 25; 100 TABLET, EXTENDED RELEASE ORAL at 22:38

## 2022-01-14 RX ADMIN — CARBIDOPA AND LEVODOPA 1 TABLET: 25; 100 TABLET ORAL at 07:59

## 2022-01-14 RX ADMIN — ACETAMINOPHEN 975 MG: 325 TABLET, FILM COATED ORAL at 21:44

## 2022-01-14 RX ADMIN — CARBIDOPA AND LEVODOPA 2 TABLET: 25; 100 TABLET ORAL at 19:41

## 2022-01-14 RX ADMIN — CARBIDOPA AND LEVODOPA 1 TABLET: 25; 100 TABLET ORAL at 11:54

## 2022-01-14 NOTE — PROGRESS NOTES
Care Management Follow Up    Length of Stay (days): 0    Expected Discharge Date: 01/17/2022     Concerns to be Addressed: all concerns addressed in this encounter,care coordination/care conferences,discharge planning     Patient plan of care discussed at interdisciplinary rounds: Yes    Anticipated Discharge Disposition: Transitional Care     Anticipated Discharge Services: None  Anticipated Discharge DME: None    Patient/family educated on Medicare website which has current facility and service quality ratings: no  Education Provided on the Discharge Plan:    Patient/Family in Agreement with the Plan: yes    Referrals Placed by CM/SW: External Care Coordination  Private pay costs discussed: Not applicable    Additional Information:  CM called multiple TCU for bed availability.  [facility] on Monday, 1/17/22,  [facility] accepted the bed with the understanding if a bed becomes available somewhere else we will take it and call [facility] back.      [id number]    Senia Rivas RN, BSN, PHN, CCM  Care Coordinator  [facility]  [phone]

## 2022-01-14 NOTE — PROGRESS NOTES
[facility]    Hospitalist Progress Note      Assessment & Plan   Pablo Baez is a 76 year old right hand dominant female with past medical history significant for BPPV, cervical spondylosis with myelopathy and cervical fusion, Parkinson's Disease, who was admitted on 1/12/2022 after multiple reported unwitnessed falls at assisted living.   She c/o mid back pain, found to have multiple subacute v acute fractures. Imaging demonstrated subtle fracture through T2 endplate, T5 vertebral body (subacute?) possible acute lucent fractures through the spinous processes of T6/T7 prior spinal fusion hardware in the cervical spine along with degenerative changes. Fortunately without abnormal edema within the spinal cord, or significant spinal canal narrowing in the thoracic spine. She was admitted to observation for further cares and monitoring. Neurosurgery was consulted from the emergency department who did not recommend any bracing and outpatient follow-up to re-image in 6 weeks.      Remains admitted for disposition assistance.    Unwitnessed Fall, History of Multiple Falls  Thoracic Spine Fractures   Admitted after ~8 falls since December. Recurrent issue for patient. Fall on 1/11 (not evaluated, [facility]) and 1/12 when staff at AL were not permitted to get patient up and pt had reported being unable to get off the floor for hours, lying on her left side.   -Per documentation Neurosurgery recommendations in the ED can follow-up with neurosurgery in 6  weeks.  -no uncontrolled pain     Parkinson's disease  Previously followed with [facility] neurology, Dr. Hinojosa.  Diagnosed in 2018. Has history of hallucinations with this, insomnia, difficulty with oral secretions, intermittent oral dyskinesia, gait instability     - Continue Seroquel, 25 mg at bedtime.  Given concern for lethargy and contribution to falls reduced to 12.5 mg at bedtime.  May need to titrate back up to previous dose if insomnia  and hallucinations again become a problem  - resume sinemet at previous schedule  - Will need close outpatient neurology follow-up  - soft diet, SLP was consulted and recommended ongoing SLP therapies for chronic dysphagia.  Easy to chew diet with thin liquids/upright/avoiding straws with small bites and sips     Left Wrist Weakness, Paraesthesias  By exam concerning for possible peripheral nerve injury from fall, extremity impinged as unable to get off left side. Clinically improving.     No humerus fracture. Concern for cervical disease v peripheral nerve injury. Thought to precede fall on 1/12 when she was on her left side. Appears NSG reviewed MRI and symptoms are thought not to be c/w with cervical radiculopathy.   - Conservative treatment at this time recommended -.therapy, wrist splinting (to maintain function), and pain management    - follow-up examinations and electromyography (EMG) may be warranted if fails to continue to improve this may be done with outpt Neurologist as she is established with Dr. Hinojosa     Cognitive Impairment   Mild generalized volume loss and presumed chronic small vessel ischemic change on NCHCT.   - SLUMS this admission of 21/30  - would benefit from ongoing OT and Neurology follow up at discharge       DVT Prophylaxis: Enoxaparin (Lovenox) SQ  Code Status: Full Code  Expected Discharge: Unclear -social work is assisting.  I recommend back to assisted living with home health PT and OT, increased safety checks.  Per social work report facility has rejected taking patient back at this point and they are looking for TCU's.      Reyna Foster PA-C      Interval History   No acute events overnight  Strength in left wrist is improving as his hand strength.  She reports that the paresthesias/pain is improving.    No presyncopal symptoms.  At times incontinent.  Back pain is improved after Tylenol.  Tolerating soft diet.   No nausea or vomiting.   Had reduced dose of seroquel last  night without reports of hallucinations or insomnia.  No excessive sedation or drowsiness.      I spoke with patient's daughter Sania giving her an update for the day over the phone.    -Data reviewed today: I reviewed all new labs and imaging results over the last 24 hours.   Physical Exam   Temp: 97.6  F (36.4  C) Temp src: Oral BP: 113/59 Pulse: 88   Resp: 18 SpO2: 94 % O2 Device: None (Room air)    Vitals:    01/12/22 2316   Weight: 51.7 kg (114 lb)     Vital Signs with Ranges  Temp:  [97  F (36.1  C)-99.5  F (37.5  C)] 97.6  F (36.4  C)  Pulse:  [66-88] 88  Resp:  [16-18] 18  BP: ()/(55-71) 113/59  SpO2:  [93 %-95 %] 94 %  No intake/output data recorded.    Constitutional:Awake, alert, no apparent distress  Respiratory:  Normal work of breathing. Lungs clear to auscultation bilaterally, no crackles or wheezing.  Cardiovascular: Regular rate and rhythm, normal S1 and S2, and no murmur appreciated.   GI: Normal bowel sounds, soft, non-distended, non-tender.   MK: No gross deformities.  Reports discomfort extending from wrist to elbow from left lateral fourth and fifth digits, reduced hand  strength on left.  This is improving compared to yesterday's exam.  skin/Integument: No rashes, no cyanosis, no peripheral edema.  Neuro: Disoriented to time and events.  Oriented to self.  moving all extremities with some psychomotor retardation. Coordination and sensation grossly intact. Speech clear. No focal deficits.   Psych:  Needs frequent redirection with interview and occasionally elaborates on hallucinations.          Medications       acetaminophen  975 mg Oral TID     carbidopa-levodopa  2 tablet Oral Daily     carbidopa-levodopa  1 tablet Oral BID    And     carbidopa-levodopa  1 half-tab Oral BID     carbidopa-levodopa  2 tablet Oral BID     enoxaparin ANTICOAGULANT  40 mg Subcutaneous At Bedtime     glycopyrrolate  1 mg Oral QAM     losartan  25 mg Oral Daily     neomycin-polymixin-dexamethasone   Both  Eyes At Bedtime     QUEtiapine  12.5 mg Oral At Bedtime       Data   Recent Labs   Lab 01/12/22  0908   WBC 7.1   HGB 13.2   *         POTASSIUM 4.2   CHLORIDE 108   CO2 26   BUN 12   CR 0.52   ANIONGAP 5   GIOVANI 9.6   *       No results found for this or any previous visit (from the past 24 hour(s)).

## 2022-01-14 NOTE — PLAN OF CARE
PRIMARY DIAGNOSIS: GENERALIZED WEAKNESS WITH MULTIPLE FALLS.     OUTPATIENT/OBSERVATION GOALS TO BE MET BEFORE DISCHARGE  1. Orthostatic performed: N/A    2. Tolerating PO medications: Yes    3. Return to near baseline physical activity: No    4. Cleared for discharge by consultants (if involved): No    Vitals are Temp: 97.2  F (36.2  C) Temp src: Oral BP: 129/56 Pulse: 71   Resp: 18 SpO2: 93 %.    Patient is Alert and oriented x3 and is disoriented to situation. Denies any pain with interview. She requires 1 Assist with Gait Belt and Walker. Regular diet level 7 Easy to chew food with thin liquids. Patient is Saline locked. Plan is to discharge to TCU. long-term is un-able to take back. Social work is following for discharge planning.      Discharge Planner Nurse   Safe discharge environment identified: Yes  Barriers to discharge: Yes       Entered by: Arti Looney 01/14/2022 5:44 AM     Please review provider order for any additional goals.   Nurse to notify provider when observation goals have been met and patient is ready for discharge.

## 2022-01-14 NOTE — PLAN OF CARE
PRIMARY DIAGNOSIS: GENERALIZED WEAKNESS r/t fall resulting in T2 & T5 vertebral body fracture    OUTPATIENT/OBSERVATION GOALS TO BE MET BEFORE DISCHARGE  1. Orthostatic performed: No    2. Tolerating PO medications: Yes    3. Return to near baseline physical activity: No - pt is independent w/ walker at baseline.  Here she is assist of 1, gait belt, walker and is VERY  slow moving.      4. Cleared for discharge by consultants (if involved): No    Discharge Planner Nurse   Safe discharge environment identified: No  Barriers to discharge: Not once medically ready              Patient is lethargic at times and very difficult to arouse.  She states she didn't sleep last night and that she's just tired.  Vitals stable on RA.  She is up w/ 1, gait belt, walker and is continent of urine.  Tolerating soft diet w/ speech therapy ordered for swallow eval.

## 2022-01-14 NOTE — PLAN OF CARE
Patient is alert and oriented x4. VS WNL and documented on the FS. Lung sounds clear in all lobes and patient is on RA. Active bowel sounds in all 4 quadrants with LBM this morning. Patient wears a brief for periods of incontinence. Bilaterally (L) arm weaker than (R). Patient does complain of (L) hand numbness and numbness in bilaterally big toes. Capillary refill <3 seconds in both UE's and LE's. Pulses present in UE's and LE's. Patient got scheduled Tylenol for some back pain. Patient is a 1 assist when up. Will obtain orthostatic BP's this morning. Plan: Neurosurgery, OT and PT.     /71 (BP Location: Left arm)   Pulse 76   Temp 97.2  F (36.2  C) (Oral)   Resp 18   Wt 51.7 kg (114 lb)   SpO2 94%   BMI 21.54 kg/m

## 2022-01-14 NOTE — PLAN OF CARE
PRIMARY DIAGNOSIS: GENERALIZED WEAKNESS WITH MULTIPLE FALLS. T2 & T5 vertebral body fracture    OUTPATIENT/OBSERVATION GOALS TO BE MET BEFORE DISCHARGE  1. Orthostatic performed: N/A    2. Tolerating PO medications: Yes    3. Return to near baseline physical activity: No    4. Cleared for discharge by consultants (if involved): No    Vitals are Temp: 97  F (36.1  C) Temp src: Oral BP: 114/57 Pulse: 81   Resp: 16 SpO2: 94 %.    Patient is Alert and oriented x3 and is disoriented to situation. Denies any pain with interview. She requires 1 Assist with Gait Belt and Walker. Regular diet level 7 Easy to chew food with thin liquids. Noted to have a mild cough swallowing meds with water but no cough when she takes it with apple sauses. Patient is Saline locked. Will continue to monitor.     Discharge Planner Nurse   Safe discharge environment identified: Yes  Barriers to discharge: Yes       Entered by: Arti Looney 01/13/2022 9:39 PM     Please review provider order for any additional goals.   Nurse to notify provider when observation goals have been met and patient is ready for discharge.

## 2022-01-14 NOTE — PLAN OF CARE
PRIMARY DIAGNOSIS: GENERALIZED WEAKNESS WITH MULTIPLE FALLS.     OUTPATIENT/OBSERVATION GOALS TO BE MET BEFORE DISCHARGE  1. Orthostatic performed: N/A    2. Tolerating PO medications: Yes    3. Return to near baseline physical activity: No    4. Cleared for discharge by consultants (if involved): No    Vitals are Temp: 97.3  F (36.3  C) Temp src: Oral BP: 125/55 Pulse: 66   Resp: 18 SpO2: 95 %.    Patient is Alert and oriented x3 and is disoriented to situation. Denies any pain with interview. She requires 1 Assist with Gait Belt and Walker. Regular diet level 7 Easy to chew food with thin liquids. Patient is Saline locked. Will continue to monitor.     Discharge Planner Nurse   Safe discharge environment identified: Yes  Barriers to discharge: Yes       Entered by: Arti Looney 01/14/2022 1:21 AM     Please review provider order for any additional goals.   Nurse to notify provider when observation goals have been met and patient is ready for discharge.

## 2022-01-14 NOTE — PLAN OF CARE
PRIMARY DIAGNOSIS: FREQUENT FALLS, FRACTURE  T2-T5  OUTPATIENT/OBSERVATION GOALS TO BE MET BEFORE DISCHARGE:  1. Pain Status: Improved-controlled with oral pain medications.    2. Return to near baseline physical activity: Yes    3. Cleared for discharge by consultants (if involved): No    Blood pressure 127/64, pulse 88, temperature 97.7  F (36.5  C), temperature source Oral, resp. rate 18, weight 51.7 kg (114 lb), SpO2 95 %.    VSS  LS clear, adequate sats on room air.  Patient currently denies any pain or discomfort.  Does c/o of left had weakness.  Patient sitting up in bedside chair; appearing quite comfortable.  Peripheral pulses palpable.  Patient further c/o of left hand numbness and bilateral toe numbness. Patient is an assist of one with gait-belt  Ambulating to the bathroom prn.  Does have some incontinence.  Plan:  Continue to provide supportive cares.  Planned TCU placement,  SW consult.      Discharge Planner Nurse   Safe discharge environment identified: No  Barriers to discharge: Yes, unless medically cleared and safe discharge environment identified.         Entered by: Karen M. Lesch 01/14/2022 4:27 PM     Please review provider order for any additional goals.   Nurse to notify provider when observation goals have been met and patient is ready for discharge.

## 2022-01-15 ENCOUNTER — APPOINTMENT (OUTPATIENT)
Dept: SPEECH THERAPY | Facility: CLINIC | Age: 77
End: 2022-01-15
Payer: MEDICARE

## 2022-01-15 ENCOUNTER — APPOINTMENT (OUTPATIENT)
Dept: PHYSICAL THERAPY | Facility: CLINIC | Age: 77
End: 2022-01-15
Payer: MEDICARE

## 2022-01-15 LAB
CREAT SERPL-MCNC: 0.52 MG/DL (ref 0.52–1.04)
GFR SERPL CREATININE-BSD FRML MDRD: >90 ML/MIN/1.73M2
PLATELET # BLD AUTO: 288 10E3/UL (ref 150–450)

## 2022-01-15 PROCEDURE — G0378 HOSPITAL OBSERVATION PER HR: HCPCS

## 2022-01-15 PROCEDURE — 92526 ORAL FUNCTION THERAPY: CPT | Mod: GN | Performed by: SPEECH-LANGUAGE PATHOLOGIST

## 2022-01-15 PROCEDURE — 96372 THER/PROPH/DIAG INJ SC/IM: CPT | Performed by: PHYSICIAN ASSISTANT

## 2022-01-15 PROCEDURE — 97530 THERAPEUTIC ACTIVITIES: CPT | Mod: GP | Performed by: PHYSICAL THERAPIST

## 2022-01-15 PROCEDURE — 250N000013 HC RX MED GY IP 250 OP 250 PS 637: Performed by: PHYSICIAN ASSISTANT

## 2022-01-15 PROCEDURE — 250N000013 HC RX MED GY IP 250 OP 250 PS 637: Performed by: HOSPITALIST

## 2022-01-15 PROCEDURE — 85049 AUTOMATED PLATELET COUNT: CPT | Performed by: HOSPITALIST

## 2022-01-15 PROCEDURE — 99226 PR SUBSEQUENT OBSERVATION CARE,LEVEL III: CPT | Performed by: PHYSICIAN ASSISTANT

## 2022-01-15 PROCEDURE — 99207 PR CDG-CODE CATEGORY CHANGED: CPT | Performed by: PHYSICIAN ASSISTANT

## 2022-01-15 PROCEDURE — 36415 COLL VENOUS BLD VENIPUNCTURE: CPT | Performed by: HOSPITALIST

## 2022-01-15 PROCEDURE — 250N000011 HC RX IP 250 OP 636: Performed by: PHYSICIAN ASSISTANT

## 2022-01-15 PROCEDURE — 97116 GAIT TRAINING THERAPY: CPT | Mod: GP | Performed by: PHYSICAL THERAPIST

## 2022-01-15 PROCEDURE — 82565 ASSAY OF CREATININE: CPT | Performed by: HOSPITALIST

## 2022-01-15 RX ADMIN — ACETAMINOPHEN 975 MG: 325 TABLET, FILM COATED ORAL at 14:44

## 2022-01-15 RX ADMIN — CARBIDOPA AND LEVODOPA 2 TABLET: 25; 100 TABLET, EXTENDED RELEASE ORAL at 22:44

## 2022-01-15 RX ADMIN — GLYCOPYRROLATE 1 MG: 1 TABLET ORAL at 08:30

## 2022-01-15 RX ADMIN — CARBIDOPA AND LEVODOPA 1 HALF-TAB: 25; 100 TABLET ORAL at 08:30

## 2022-01-15 RX ADMIN — CARBIDOPA AND LEVODOPA 1 TABLET: 25; 100 TABLET ORAL at 08:30

## 2022-01-15 RX ADMIN — QUETIAPINE FUMARATE 12.5 MG: 25 TABLET ORAL at 22:45

## 2022-01-15 RX ADMIN — CARBIDOPA AND LEVODOPA 2 TABLET: 25; 100 TABLET ORAL at 16:33

## 2022-01-15 RX ADMIN — CARBIDOPA AND LEVODOPA 1 TABLET: 25; 100 TABLET ORAL at 12:27

## 2022-01-15 RX ADMIN — ACETAMINOPHEN 975 MG: 325 TABLET, FILM COATED ORAL at 22:45

## 2022-01-15 RX ADMIN — CARBIDOPA AND LEVODOPA 1 HALF-TAB: 25; 100 TABLET ORAL at 12:27

## 2022-01-15 RX ADMIN — NEOMYCIN SULFATE, POLYMYXIN B SULFATE, AND DEXAMETHASONE: 3.5; 10000; 1 OINTMENT OPHTHALMIC at 22:45

## 2022-01-15 RX ADMIN — ACETAMINOPHEN 975 MG: 325 TABLET, FILM COATED ORAL at 08:30

## 2022-01-15 RX ADMIN — CARBIDOPA AND LEVODOPA 2 TABLET: 25; 100 TABLET ORAL at 19:43

## 2022-01-15 RX ADMIN — ENOXAPARIN SODIUM 40 MG: 40 INJECTION SUBCUTANEOUS at 22:45

## 2022-01-15 NOTE — PLAN OF CARE
PRIMARY DIAGNOSIS: GENERALIZED WEAKNESS/ FALLS    OUTPATIENT/OBSERVATION GOALS TO BE MET BEFORE DISCHARGE  1. Orthostatic performed: No    2. Tolerating PO medications: Yes    3. Return to near baseline physical activity: No    4. Cleared for discharge by consultants (if involved): Yes    Discharge Planner Nurse   Safe discharge environment identified: Yes  Barriers to discharge: Yes       Entered by: LILI AIKEN 01/15/2022 4:57 PM  Vital signs:  Temp: 97.7  F (36.5  C) Temp src: Oral BP: 121/55 Pulse: 77   Resp: 16 SpO2: 96 % O2 Device: None (Room air) Alert and oriented x4. Reported chronic back pain at 8/10. Scheduled Tylenol given. Up in the chair in the morning and laying in bed this afternoon. SW/OT/PT/Speech following. On level 7 easy to chew diet with thin liquid diet. Assist of x1 with a walker and galt belt. Did better with PT today. Able to walk with pt. Appeared a little more confused this afternoon. Bed alarm on. Will continue frequent safety checks. TCU recommended by PT. Plan for discharge to [facility] on Monday 7/17. Will continue to monitor.   Please review provider order for any additional goals.   Nurse to notify provider when observation goals have been met and patient is ready for discharge.

## 2022-01-15 NOTE — PROGRESS NOTES
Care Management Follow Up    Length of Stay (days): 0    Expected Discharge Date: 01/17/2022     Concerns to be Addressed: all concerns addressed in this encounter,care coordination/care conferences,discharge planning     Patient plan of care discussed at interdisciplinary rounds: Yes    Anticipated Discharge Disposition: Transitional Care     Anticipated Discharge Services:PT/OT  Anticipated Discharge DME: None    Patient/family educated on Medicare website which has current facility and service quality ratings: yes  Education Provided on the Discharge Plan:  pt  Patient/Family in Agreement with the Plan: yes    Referrals Placed by CM/SW: TCU    Additional Information:  [facility] has a shared room on Monday, 1/17/22.  Pt agreeable.  She will check with family about transportation on Monday.      Vanessa Alfaro RN BSN   Inpatient Care Coordination  [facility]  [phone]      Alyssa Alfaro, RN

## 2022-01-15 NOTE — PLAN OF CARE
PRIMARY DIAGNOSIS: GENERALIZED WEAKNESS/ FALLS/ PARKINSON'S     OUTPATIENT/OBSERVATION GOALS TO BE MET BEFORE DISCHARGE  1. Orthostatic performed: No    2. Tolerating PO medications: Yes    3. Return to near baseline physical activity: No    4. Cleared for discharge by consultants (if involved): N/A    Discharge Planner Nurse   Safe discharge environment identified: Yes  Barriers to discharge: Yes       Entered by: LILI AIKEN 01/15/2022   Vital signs:  Temp: 98.8  F (37.1  C) Temp src: Oral BP: 129/62 Pulse: 87   Resp: 16 SpO2: 94 % O2 Device: None (Room air) Alert and oriented x4. Reported chronic back pain at 8/10. Scheduled Tylenol given. Up in the chair.SW/OT/PT/Speech following. On level 7 easy to chew diet with thin liquid diet. Assist of x1 with a walker and galt belt. TCU recommended by PT. Plan for discharge to [facility]CU on Monday 7/17. Will continue to monitor.        Please review provider order for any additional goals.   Nurse to notify provider when observation goals have been met and patient is ready for discharge.

## 2022-01-15 NOTE — UTILIZATION REVIEW
Concurrent stay review; Secondary Review Determination     Under the authority of the Utilization Management Committee, the utilization review process indicated a secondary review on the above patient.  The review outcome is based on review of the medical records, discussions with staff, and applying clinical experience noted on the date of the review.          (x) Observation Status Appropriate - Concurrent stay review    RATIONALE FOR DETERMINATION   75 yo female with Parkinson's disease, BPPV, hypertension osteoporosis who was admitted to observation for mid back pain after a fall resulting in T2 & T5 fractures. Neurosurgery consulted and thoracic fractures appear to be stable and did not need surgical intervention. Outpatient follow up in 6 weeks recommended.    Recommended return to assisted living with additional support but facility refused to accept her return in her current condition. Awaiting confirmation of TCU placement which may take several days considering holiday weekend.    Patient is clinically improving and there is no clear indication to change patient's status to inpatient. The severity of illness, intensity of service provided, expected LOS and risk for adverse outcome make the care appropriate for observation.    This document was produced using voice recognition software     The information on this document is developed by the utilization review team in order for the business office to ensure compliance.  This only denotes the appropriateness of proper admission status and does not reflect the quality of care rendered.         The definitions of Inpatient Status and Observation Status used in making the determination above are those provided in the CMS Coverage Manual, Chapter 1 and Chapter 6, section 70.4.      Sincerely,   Yamilka Calderon MD  Utilization Review  Physician Advisor  [facility].

## 2022-01-15 NOTE — PLAN OF CARE
PRIMARY DIAGNOSIS: FREQUENT FALLS, FRACTURE  T2-T5  OUTPATIENT/OBSERVATION GOALS TO BE MET BEFORE DISCHARGE:  1. Pain Status: Improved-controlled with oral pain medications.     2. Return to near baseline physical activity: Yes     3. Cleared for discharge by consultants (if involved): No     /65 (BP Location: Left arm, Patient Position: Left side)   Pulse 79   Temp 97.6  F (36.4  C) (Oral)   Resp 16   Wt 51.7 kg (114 lb)   SpO2 97%   BMI 21.54 kg/m       Resting, denies pain or needs at this time.  VSS. Will continue to monitor and assess.        Discharge Planner Nurse   Safe discharge environment identified: No  Barriers to discharge: Yes, unless medically cleared and safe discharge environment identified.      Entered by: Nadia Lopez RN on 1/15/2022 at 1:47 AM     Please review provider order for any additional goals.   Nurse to notify provider when observation goals have been met and patient is ready for discharge.

## 2022-01-15 NOTE — PLAN OF CARE
"PRIMARY DIAGNOSIS: FREQUENT FALLS, FRACTURE  T2-T5  OUTPATIENT/OBSERVATION GOALS TO BE MET BEFORE DISCHARGE:  1. Pain Status: Improved-controlled with oral pain medications.    2. Return to near baseline physical activity: Yes    3. Cleared for discharge by consultants (if involved): No    Blood pressure 122/58, pulse 80, temperature 97.9  F (36.6  C), temperature source Oral, resp. rate 18, weight 51.7 kg (114 lb), SpO2 97 %.    Vital signs remain stable.  Patient tolerating evening meal tray without nausea or emesis.  Patient is an assist of one with gait belt and walker, ambulating to the bathroom prn.  Patient passing one medium formed stool.   Patient has a \"shuffling\" gait.  Patient up in chair most of shift.  Plan:  Continue to provide supportive cares.  SW consult for placement.          Discharge Planner Nurse   Safe discharge environment identified: No  Barriers to discharge: Yes, unless medically cleared and safe discharge environment identified.         Entered by: Karen M. Lesch 01/14/2022 10:41 PM     Please review provider order for any additional goals.   Nurse to notify provider when observation goals have been met and patient is ready for discharge.  "

## 2022-01-15 NOTE — PROGRESS NOTES
[facility]    Hospitalist Progress Note    Assessment & Plan Pablo Baez is a 76 year old right hand dominant female with past medical history significant for BPPV, cervical spondylosis with myelopathy and cervical fusion, Parkinson's Disease, who was admitted on 1/12/2022 after multiple reported unwitnessed falls at assisted living.   She c/o mid back pain, found to have multiple subacute v acute fractures. Imaging demonstrated subtle fracture through T2 endplate, T5 vertebral body (subacute?) possible acute lucent fractures through the spinous processes of T6/T7 prior spinal fusion hardware in the cervical spine along with degenerative changes. Fortunately without abnormal edema within the spinal cord, or significant spinal canal narrowing in the thoracic spine. She was admitted to observation for further cares and monitoring. Neurosurgery was consulted from the emergency department who did not recommend any bracing and outpatient follow-up to re-image in 6 weeks.      Remains admitted for disposition assistance.     Unwitnessed Fall, History of Multiple Falls  Thoracic Spine Fractures   Admitted after ~8 falls since December. Recurrent issue for patient. Fall on 1/11 (not evaluated, [facility]) and 1/12 when staff at AL were not permitted to get patient up and pt had reported being unable to get off the floor for hours, lying on her left side.   -Per documentation Neurosurgery recommendations in the ED can follow-up with neurosurgery in 6  weeks.  -no uncontrolled pain with regimen of topical therapy, APAP     Parkinson's disease  Previously followed with [facility] neurology, Dr. Hinojosa.  Diagnosed in 2018. Has history of hallucinations with this, insomnia, difficulty with oral secretions, intermittent oral dyskinesia, gait instability     - Continue Seroquel, 25 mg at bedtime.  Given concern for lethargy and contribution to falls reduced to 12.5 mg at bedtime.  May need to titrate  Rcv'd A/o/3  Assisted to bathroom  Brushed teeth   Returned to bed with this writer and rechecked bilateral groins  Rt groin oozing through gauze and Tegaderm  Place HOB flat and applied pressure to rt groin  Charge notified and after 20 minutes of applied back up to previous dose if insomnia and hallucinations again become a problem  - resume sinemet at previous schedule  - Will need close outpatient neurology follow-up  - soft diet, SLP was consulted and recommended ongoing SLP therapies for chronic dysphagia.  Easy to chew diet with thin liquids/upright/avoiding straws with small bites and sips     Left Wrist Weakness, Paraesthesias  By exam concerning for possible peripheral nerve injury from fall, extremity impinged as unable to get off left side. This is steadily improving.   No humerus fracture. Concern for cervical disease v peripheral nerve injury. Thought to precede fall on 1/12 when she was on her left side. Appears NSG reviewed MRI and symptoms are thought not to be c/w with cervical radiculopathy.   - Conservative treatment at this time recommended -.therapy, wrist splinting (to maintain function), and pain management    - follow-up examinations and electromyography (EMG) may be warranted if fails to continue to improve this may be done with outpt Neurologist as she is established with Dr. Hinojosa     Cognitive Impairment   Mild generalized volume loss and presumed chronic small vessel ischemic change on NCHCT.   - SLUMS this admission of 21/30  - would benefit from ongoing OT and Neurology follow up at discharge     DVT Prophylaxis: Enoxaparin (Lovenox) SQ  Code Status: Full Code  Expected Discharge: Unclear -social work is assisting.  I recommend back to assisted living with home health PT and OT, increased safety checks.  Per social work report facility has rejected taking patient back at this point and they are looking for TCU's.    Reyna Foster PA-C      Interval History   Doing well  Has some lidocaine patches on her low back this is improved along with Tylenol.  Strength is slowly improving in her left hand and wrist. Eating independently utilizing her left upper extremity.   Orthostatic blood pressures were negative.  No hallucinations or  excessive sedation.    -Data reviewed today: I reviewed all new labs and imaging results over the last 24 hours.     Physical Exam   Temp: 98.5  F (36.9  C) Temp src: Oral BP: 131/58 Pulse: 80   Resp: 16 SpO2: 98 % O2 Device: None (Room air)    Vitals:    01/12/22 2316   Weight: 51.7 kg (114 lb)     Vital Signs with Ranges  Temp:  [97.6  F (36.4  C)-98.8  F (37.1  C)] 98.5  F (36.9  C)  Pulse:  [79-88] 80  Resp:  [14-18] 16  BP: (122-135)/(58-65) 131/58  SpO2:  [94 %-98 %] 98 %  No intake/output data recorded.    Constitutional:Awake, alert, no apparent distress  Respiratory:  Normal work of breathing. Lungs clear to auscultation bilaterally, no crackles or wheezing.  Cardiovascular: Regular rate and rhythm, normal S1 and S2, and no murmur appreciated.   GI: Normal bowel sounds, soft, non-distended, non-tender.   MK: No gross deformities. Reports minimal discomfort extending from wrist to elbow from left lateral fourth and fifth digits, reduced hand  strength on left compared to right.  Radial pulses intact and symmetric as is sensation to light touch.  skin/Integument: No rashes, no cyanosis, no peripheral edema.  Neuro: Disoriented to time and events.  Oriented to self.  moving all extremities with some psychomotor retardation. Coordination and sensation grossly intact. Speech clear. No focal deficits.   Psych:  Needs frequent redirection with interview and occasionally elaborates on hallucinations.       Medications       acetaminophen  975 mg Oral TID     carbidopa-levodopa  2 tablet Oral Daily     carbidopa-levodopa  1 tablet Oral BID    And     carbidopa-levodopa  1 half-tab Oral BID     carbidopa-levodopa  2 tablet Oral BID     enoxaparin ANTICOAGULANT  40 mg Subcutaneous At Bedtime     glycopyrrolate  1 mg Oral QAM     losartan  25 mg Oral Daily     neomycin-polymixin-dexamethasone   Both Eyes At Bedtime     QUEtiapine  12.5 mg Oral At Bedtime       Data   Recent Labs   Lab 01/15/22  0558 01/12/22  0970    WBC  --  7.1   HGB  --  13.2   MCV  --  108*    326   NA  --  139   POTASSIUM  --  4.2   CHLORIDE  --  108   CO2  --  26   BUN  --  12   CR 0.52 0.52   ANIONGAP  --  5   GIOVANI  --  9.6   GLC  --  106*       No results found for this or any previous visit (from the past 24 hour(s)).

## 2022-01-15 NOTE — PLAN OF CARE
PRIMARY DIAGNOSIS: GENERALIZED WEAKNESS/ FALLS    OUTPATIENT/OBSERVATION GOALS TO BE MET BEFORE DISCHARGE  1. Orthostatic performed: No    2. Tolerating PO medications: Yes    3. Return to near baseline physical activity: Yes    4. Cleared for discharge by consultants (if involved): Yes    Discharge Planner Nurse   Safe discharge environment identified: Yes  Barriers to discharge: Yes       Entered by: LILI AIKEN 01/15/2022   Vital signs:  Temp: 98.5  F (36.9  C) Temp src: Oral BP: 131/58 Pulse: 80   Resp: 16 SpO2: 98 % O2 Device: None (Room air)  Alert and oriented x4. Reported chronic back pain at 8/10. Scheduled Tylenol given. Up in the chair.SW/OT/PT/Speech following. On level 7 easy to chew diet with thin liquid diet. Assist of x1 with a walker and galt belt. Did better with PT today. Able to walk with pt. TCU recommended by PT. Plan for discharge to [facility] TCU on Monday 7/17. Will continue to monitor.        Please review provider order for any additional goals.   Nurse to notify provider when observation goals have been met and patient is ready for discharge.

## 2022-01-16 PROCEDURE — 99226 PR SUBSEQUENT OBSERVATION CARE,LEVEL III: CPT | Performed by: PHYSICIAN ASSISTANT

## 2022-01-16 PROCEDURE — 250N000013 HC RX MED GY IP 250 OP 250 PS 637: Performed by: PHYSICIAN ASSISTANT

## 2022-01-16 PROCEDURE — 250N000011 HC RX IP 250 OP 636: Performed by: PHYSICIAN ASSISTANT

## 2022-01-16 PROCEDURE — G0378 HOSPITAL OBSERVATION PER HR: HCPCS

## 2022-01-16 PROCEDURE — 250N000013 HC RX MED GY IP 250 OP 250 PS 637: Performed by: HOSPITALIST

## 2022-01-16 PROCEDURE — 96372 THER/PROPH/DIAG INJ SC/IM: CPT | Performed by: PHYSICIAN ASSISTANT

## 2022-01-16 RX ADMIN — CARBIDOPA AND LEVODOPA 1 TABLET: 25; 100 TABLET ORAL at 08:07

## 2022-01-16 RX ADMIN — CARBIDOPA AND LEVODOPA 1 TABLET: 25; 100 TABLET ORAL at 12:24

## 2022-01-16 RX ADMIN — IBUPROFEN 400 MG: 400 TABLET ORAL at 05:32

## 2022-01-16 RX ADMIN — CARBIDOPA AND LEVODOPA 1 HALF-TAB: 25; 100 TABLET ORAL at 08:07

## 2022-01-16 RX ADMIN — GLYCOPYRROLATE 1 MG: 1 TABLET ORAL at 08:08

## 2022-01-16 RX ADMIN — CARBIDOPA AND LEVODOPA 2 TABLET: 25; 100 TABLET ORAL at 17:04

## 2022-01-16 RX ADMIN — CARBIDOPA AND LEVODOPA 2 TABLET: 25; 100 TABLET ORAL at 21:34

## 2022-01-16 RX ADMIN — ACETAMINOPHEN 975 MG: 325 TABLET, FILM COATED ORAL at 14:29

## 2022-01-16 RX ADMIN — QUETIAPINE FUMARATE 12.5 MG: 25 TABLET ORAL at 21:35

## 2022-01-16 RX ADMIN — ENOXAPARIN SODIUM 40 MG: 40 INJECTION SUBCUTANEOUS at 21:35

## 2022-01-16 RX ADMIN — CARBIDOPA AND LEVODOPA 1 HALF-TAB: 25; 100 TABLET ORAL at 12:24

## 2022-01-16 RX ADMIN — NEOMYCIN SULFATE, POLYMYXIN B SULFATE, AND DEXAMETHASONE: 3.5; 10000; 1 OINTMENT OPHTHALMIC at 21:35

## 2022-01-16 RX ADMIN — ACETAMINOPHEN 975 MG: 325 TABLET, FILM COATED ORAL at 08:07

## 2022-01-16 RX ADMIN — ACETAMINOPHEN 975 MG: 325 TABLET, FILM COATED ORAL at 21:34

## 2022-01-16 NOTE — PLAN OF CARE
PRIMARY DIAGNOSIS: GENERALIZED WEAKNESS/ FALLS    OUTPATIENT/OBSERVATION GOALS TO BE MET BEFORE DISCHARGE  1. Orthostatic performed: No    2. Tolerating PO medications: Yes    3. Return to near baseline physical activity: Yes    4. Cleared for discharge by consultants (if involved): Yes    Discharge Planner Nurse   Safe discharge environment identified: Yes  Barriers to discharge: Yes       Entered by: LILI AIKEN 01/16/2022   Vital signs:  Temp: 98.3  F (36.8  C) Temp src: Oral BP: 119/54 Pulse: 74   Resp: 16 SpO2: 96 % O2 Device: None (Room air) Alert and oriented x4. Reported chronic back pain better this morning and rated at 3/10. Scheduled Tylenol given. SW/OT/PT/Speech following. On level 7 easy to chew diet with thin liquid diet. Assist of x1 with a walker and galt belt. Did better with PT yesterday. Able to walk with pt. was confused last night but awake and oriented x4 this morning. Bed alarm on. TCU recommended by PT. Plan to discharge to [facility] TCU on Monday 7/17 after 1pm. Will continue to monitor.     Please review provider order for any additional goals.   Nurse to notify provider when observation goals have been met and patient is ready for discharge.

## 2022-01-16 NOTE — PLAN OF CARE
PRIMARY DIAGNOSIS: GENERALIZED WEAKNESS    OUTPATIENT/OBSERVATION GOALS TO BE MET BEFORE DISCHARGE  1. Orthostatic performed: No    2. Tolerating PO medications: Yes    3. Return to near baseline physical activity: No    4. Cleared for discharge by consultants (if involved): Yes    Discharge Planner Nurse   Safe discharge environment identified: Yes  Barriers to discharge: Yes        Entered by: Lyric Souza 01/15/2022 10:59 PM       Vitals are Temp: 98.1  F (36.7  C) Temp src: Axillary BP: 114/59 Pulse: 75   Resp: 16 SpO2: 94 %.  Patient is Alert and Oriented x4 but forgetful. Lethargic. They are 1 Assist with Gait Belt and Walker.  Pt is a easy to chew diet with thin liquids, no straw.  They are complaining of pain in their back. Scheduled Tylenol given. Patient is Saline locked. Appeared a bit confused during shift. Bed alarm on. TCU recommended by PT. Plan for discharge to [facility] TCU on Monday 7/17. Will continue to monitor.     Please review provider order for any additional goals.   Nurse to notify provider when observation goals have been met and patient is ready for discharge.

## 2022-01-16 NOTE — PLAN OF CARE
PRIMARY DIAGNOSIS: FREQUENT FALLS, FRACTURE  T2-T5  OUTPATIENT/OBSERVATION GOALS TO BE MET BEFORE DISCHARGE:  1. Pain Status: Improved-controlled with oral pain medications.     2. Return to near baseline physical activity: Yes     3. Cleared for discharge by consultants (if involved): No         Resting, denies pain or needs at this time.  VSS. Will continue to monitor and assess.        Discharge Planner Nurse   Safe discharge environment identified: No  Barriers to discharge: Yes, unless medically cleared and safe discharge environment identified.       Entered by: Nadia Lopez RN on 1/16/2022 at 0200     Please review provider order for any additional goals.   Nurse to notify provider when observation goals have been met and patient is ready for discharge.

## 2022-01-16 NOTE — PLAN OF CARE
PRIMARY DIAGNOSIS: GENERALIZED WEAKNESS/ FALLS    OUTPATIENT/OBSERVATION GOALS TO BE MET BEFORE DISCHARGE  1. Orthostatic performed: No    2. Tolerating PO medications: Yes    3. Return to near baseline physical activity: Yes    4. Cleared for discharge by consultants (if involved): Yes    Discharge Planner Nurse   Safe discharge environment identified: Yes  Barriers to discharge: Yes       Entered by: LILI AIKEN 01/16/2022   Vital signs:  Temp: 98.3  F (36.8  C) Temp src: Oral BP: 131/58 Pulse: 71   Resp: 16 SpO2: 93 % O2 Device: None (Room air)  Alert and oriented x4. Reported chronic back pain better this morning and rated at 3/10. Scheduled Tylenol given. SW/OT/PT/Speech following. On level 7 easy to chew diet with thin liquid diet. Assist of x1 with a walker and galt belt. Did better with PT yesterday. Able to walk with pt. was confused last night but awake and oriented x4 this morning. Bed alarm on. TCU recommended by PT. Plan to discharge to [facility] TCU on Monday 7/17. Will continue to monitor.    Please review provider order for any additional goals.   Nurse to notify provider when observation goals have been met and patient is ready for discharge.

## 2022-01-16 NOTE — PROGRESS NOTES
[facility]    Hospitalist Progress Note      Assessment & Plan    Pablo Baez is a 76 year old right hand dominant female with past medical history significant for BPPV, cervical spondylosis with myelopathy and cervical fusion, Parkinson's Disease, who was admitted on 1/12/2022 after multiple reported unwitnessed falls at assisted living.   She c/o mid back pain, found to have multiple subacute v acute fractures. Imaging demonstrated subtle fracture through T2 endplate, T5 vertebral body (subacute?) possible acute lucent fractures through the spinous processes of T6/T7 prior spinal fusion hardware in the cervical spine along with degenerative changes. Fortunately without abnormal edema within the spinal cord, or significant spinal canal narrowing in the thoracic spine. She was admitted to observation for further cares and monitoring. Neurosurgery was consulted from the emergency department who did not recommend any bracing and outpatient follow-up to re-image in 6 weeks.      Remains admitted for disposition assistance.     Unwitnessed Fall, History of Multiple Falls  Thoracic Spine Fractures   Admitted after ~8 falls since December. Recurrent issue for patient. Fall on 1/11 (not evaluated, [facility]) and 1/12 when staff at AL were not permitted to get patient up and pt had reported being unable to get off the floor for hours, lying on her left side.   -Per documentation Neurosurgery recommendations in the ED can follow-up with neurosurgery in 6  weeks.  -no uncontrolled pain with regimen of topical therapy, APAP     Parkinson's disease  Previously followed with [facility] neurology, Dr. Hinojosa.  Diagnosed in 2018. Has history of hallucinations with this, insomnia, difficulty with oral secretions, intermittent oral dyskinesia, gait instability     - Continue Seroquel, 25 mg at bedtime.  Given concern for lethargy and contribution to falls reduced to 12.5 mg at bedtime.  May need to  titrate back up to previous dose if insomnia and hallucinations again become a problem  - resume sinemet at previous schedule  - Will need close outpatient neurology follow-up  - soft diet, SLP was consulted and recommended ongoing SLP therapies for chronic dysphagia.  Easy to chew diet with thin liquids/upright/avoiding straws with small bites and sips     Left Wrist Weakness, Paraesthesias  By exam concerning for possible peripheral (ulner) nerve injury from fall, extremity impinged as unable to get off left side. This is steadily improving.   No humerus fracture. Concern for cervical disease v peripheral nerve injury. Thought to precede fall on 1/12 when she was on her left side. Appears NSG reviewed MRI and symptoms are thought not to be c/w with cervical radiculopathy.   - Conservative treatment at this time recommended -.therapy, wrist splinting (to maintain function), and pain management    - follow-up examinations and electromyography (EMG) may be warranted if fails to continue to improve this may be done with outpt Neurologist as she is established with Dr. Hinojosa     Cognitive Impairment   Mild generalized volume loss and presumed chronic small vessel ischemic change on NCHCT.   - SLUMS this admission of 21/30  - would benefit from ongoing OT and Neurology follow up at discharge     DVT Prophylaxis: Enoxaparin (Lovenox) SQ  Code Status: Full Code  Expected Discharge: Unclear -social work is assisting.  I recommend back to assisted living if they can accommodate with home health PT and OT, increased safety checks.  Per social work report - facility has rejected taking patient back at this point and they are looking for TCU's.     Reyna Foster PA-C      Interval History     No acute events overnight patient is seen sitting up in the recliner this morning after she has completed eating breakfast.  She has no pain.  She has been ambulating with staff safely.  Her left wrist is improving.  She has minimal  discomfort and is able to flex her lip left wrist but extension remains limited.    -Data reviewed today: I reviewed all new labs and imaging results over the last 24 hours.   Physical Exam   Temp: 98.3  F (36.8  C) Temp src: Oral BP: 131/58 Pulse: 71   Resp: 16 SpO2: 93 % O2 Device: None (Room air)    Vitals:    01/12/22 2316   Weight: 51.7 kg (114 lb)     Vital Signs with Ranges  Temp:  [97.7  F (36.5  C)-98.5  F (36.9  C)] 98.3  F (36.8  C)  Pulse:  [71-80] 71  Resp:  [14-16] 16  BP: (114-131)/(55-59) 131/58  SpO2:  [93 %-98 %] 93 %  No intake/output data recorded.      Constitutional:Awake, alert, no apparent distress  Respiratory:  Normal work of breathing. Lungs clear to auscultation bilaterally, no crackles or wheezing.  Cardiovascular: Regular rate and rhythm, normal S1 and S2, and no murmur appreciated.   GI: Normal bowel sounds, soft, non-distended, non-tender.   MK: No gross deformities. Reports minimal discomfort extending from wrist to elbow from left lateral fourth and fifth digits, slightly reduced hand  strength on left compared to right. Left wrist extension is limited, flexion intact. Radial pulses intact and symmetric as is sensation to light touch.  skin/Integument: No rashes, no cyanosis, no peripheral edema.  Neuro: oriented to self, place, somewhat to situation.  moving all extremities. Coordination and sensation grossly intact. Speech clear. No focal deficits.   Psych:  Needs frequent redirection with interview. No hallucinations.          Medications       acetaminophen  975 mg Oral TID     carbidopa-levodopa  2 tablet Oral Daily     carbidopa-levodopa  1 tablet Oral BID    And     carbidopa-levodopa  1 half-tab Oral BID     carbidopa-levodopa  2 tablet Oral BID     enoxaparin ANTICOAGULANT  40 mg Subcutaneous At Bedtime     glycopyrrolate  1 mg Oral QAM     losartan  25 mg Oral Daily     neomycin-polymixin-dexamethasone   Both Eyes At Bedtime     QUEtiapine  12.5 mg Oral At Bedtime        Data   Recent Labs   Lab 01/15/22  0558 01/12/22  0908   WBC  --  7.1   HGB  --  13.2   MCV  --  108*    326   NA  --  139   POTASSIUM  --  4.2   CHLORIDE  --  108   CO2  --  26   BUN  --  12   CR 0.52 0.52   ANIONGAP  --  5   GIOVANI  --  9.6   GLC  --  106*       No results found for this or any previous visit (from the past 24 hour(s)).

## 2022-01-16 NOTE — PLAN OF CARE
PRIMARY DIAGNOSIS: GENERALIZED WEAKNESS    OUTPATIENT/OBSERVATION GOALS TO BE MET BEFORE DISCHARGE  1. Orthostatic performed: No    2. Tolerating PO medications: Yes    3. Return to near baseline physical activity: No    4. Cleared for discharge by consultants (if involved): Yes    Discharge Planner Nurse   Safe discharge environment identified: Yes  Barriers to discharge: Yes        Entered by: Lyric Souza 01/15/2022 10:55 PM       Vitals are Temp: 98.1  F (36.7  C) Temp src: Axillary BP: 114/59 Pulse: 75   Resp: 16 SpO2: 94 %.  Patient is Alert and Oriented x4 but forgetful. Lethargic. They are 1 Assist with Gait Belt and Walker.  Pt is a easy to chew diet with thin liquids, no straw.  They are complaining of pain in their back. Scheduled Tylenol given. Patient is Saline locked. Appeared a bit confused during shift. Bed alarm on. TCU recommended by PT. Plan for discharge to [facility] TCU on Monday 7/17. Will continue to monitor.     Please review provider order for any additional goals.   Nurse to notify provider when observation goals have been met and patient is ready for discharge.

## 2022-01-16 NOTE — PROGRESS NOTES
Care Management Follow Up     Length of Stay (days): 0     Expected Discharge Date: 01/17/2022     Concerns to be Addressed: all concerns addressed in this encounter,care coordination/care conferences,discharge planning     Patient plan of care discussed at interdisciplinary rounds: Yes     Anticipated Discharge Disposition: Transitional Care     Anticipated Discharge Services:PT/OT  Anticipated Discharge DME: None     Patient/family educated on Medicare website which has current facility and service quality ratings: yes  Education Provided on the Discharge Plan:  pt  Patient/Family in Agreement with the Plan: yes     Referrals Placed by CM/SW: TCU     Additional Information:  Teofilo kim  has a shared room on Monday, 1/17/22.  Pt agreeable.  She will check with family about transportation on Monday.  [facility] can accept her anytime after 1300.  I left  for Dtg.     11:24.  [facility] can provide transport at around 1pm.  Cell : [phone]     Vanessa Alfaro RN BSN   Inpatient Care Coordination  [facility]  [phone]        Alyssa Alfaro, RN

## 2022-01-17 ENCOUNTER — LAB REQUISITION (OUTPATIENT)
Dept: LAB | Facility: CLINIC | Age: 77
End: 2022-01-17
Payer: MEDICARE

## 2022-01-17 VITALS
BODY MASS INDEX: 21.54 KG/M2 | WEIGHT: 114 LBS | HEART RATE: 75 BPM | DIASTOLIC BLOOD PRESSURE: 66 MMHG | OXYGEN SATURATION: 98 % | SYSTOLIC BLOOD PRESSURE: 131 MMHG | RESPIRATION RATE: 16 BRPM | TEMPERATURE: 97.9 F

## 2022-01-17 DIAGNOSIS — Z11.1 ENCOUNTER FOR SCREENING FOR RESPIRATORY TUBERCULOSIS: ICD-10-CM

## 2022-01-17 PROCEDURE — 99217 PR OBSERVATION CARE DISCHARGE: CPT | Performed by: PHYSICIAN ASSISTANT

## 2022-01-17 PROCEDURE — 250N000013 HC RX MED GY IP 250 OP 250 PS 637: Performed by: HOSPITALIST

## 2022-01-17 PROCEDURE — 250N000013 HC RX MED GY IP 250 OP 250 PS 637: Performed by: PHYSICIAN ASSISTANT

## 2022-01-17 PROCEDURE — G0378 HOSPITAL OBSERVATION PER HR: HCPCS

## 2022-01-17 RX ORDER — ACETAMINOPHEN 500 MG
1000 TABLET ORAL 3 TIMES DAILY
DISCHARGE
Start: 2022-01-17 | End: 2022-11-01

## 2022-01-17 RX ORDER — LIDOCAINE 4 G/G
1 PATCH TOPICAL EVERY 24 HOURS
Qty: 5 PATCH | Refills: 0 | Status: SHIPPED | OUTPATIENT
Start: 2022-01-17 | End: 2022-11-01

## 2022-01-17 RX ORDER — QUETIAPINE FUMARATE 25 MG/1
12.5 TABLET, FILM COATED ORAL AT BEDTIME
Status: ON HOLD | DISCHARGE
Start: 2022-01-17 | End: 2022-11-12

## 2022-01-17 RX ORDER — IBUPROFEN 400 MG/1
400 TABLET, FILM COATED ORAL EVERY 6 HOURS PRN
DISCHARGE
Start: 2022-01-17 | End: 2022-11-01

## 2022-01-17 RX ADMIN — CARBIDOPA AND LEVODOPA 1 HALF-TAB: 25; 100 TABLET ORAL at 12:42

## 2022-01-17 RX ADMIN — GLYCOPYRROLATE 1 MG: 1 TABLET ORAL at 08:22

## 2022-01-17 RX ADMIN — CARBIDOPA AND LEVODOPA 1 HALF-TAB: 25; 100 TABLET ORAL at 08:31

## 2022-01-17 RX ADMIN — CARBIDOPA AND LEVODOPA 1 TABLET: 25; 100 TABLET ORAL at 08:22

## 2022-01-17 RX ADMIN — CARBIDOPA AND LEVODOPA 1 TABLET: 25; 100 TABLET ORAL at 12:42

## 2022-01-17 RX ADMIN — ACETAMINOPHEN 975 MG: 325 TABLET, FILM COATED ORAL at 08:21

## 2022-01-17 NOTE — PLAN OF CARE
Patient's After Visit Summary was reviewed with patient and/or family.   Patient verbalized understanding of After Visit Summary, recommended follow up and was given an opportunity to ask questions.   Discharge medications sent home with patient/family: No   Discharged with son    OBSERVATION patient END time: 1402       W-C, ride provided, packet sent with for tcu.

## 2022-01-17 NOTE — PLAN OF CARE
Physical Therapy Discharge Summary    Reason for therapy discharge:    Discharged to transitional care facility.    Progress towards therapy goal(s). See goals on Care Plan in [facility] electronic health record for goal details.  Goals not met.  Barriers to achieving goals:   discharge from facility.    Therapy recommendation(s):    Continued therapy is recommended.  Rationale/Recommendations:  PT as indicated at TCU.      Note: Pt not seen by documenting PT on this date. Information obtained from chart review.

## 2022-01-17 NOTE — DISCHARGE SUMMARY
[facility]    Discharge Summary  Hospitalist    Date of Admission:  1/12/2022  Date of Discharge:  1/17/2022  2:06 PM  Discharging Provider: Reyna Foster PA-C  Date of Service (when I saw the patient): 01/17/22    Discharge Diagnoses   Recurrent Falls  Left upper extremity peripheral nerve injury   Thoracic Spine Fractures, some of undetermined acuity   Parkinson's Disease    History of Present Illness   Pablo Baez is a 76 year old right hand dominant female with past medical history significant for BPPV, cervical spondylosis with myelopathy and cervical fusion, Parkinson's Disease, who was admitted on 1/12/2022 after multiple reported unwitnessed falls at assisted living.   She c/o mid back pain, found to have multiple subacute v acute fractures. Imaging demonstrated subtle fracture through T2 endplate, T5 vertebral body (subacute?) possible acute lucent fractures through the spinous processes of T6/T7 prior spinal fusion hardware in the cervical spine along with degenerative changes. Fortunately without abnormal edema within the spinal cord, or significant spinal canal narrowing in the thoracic spine. She was admitted to observation for further cares and monitoring. Neurosurgery was consulted from the emergency department who did not recommend any bracing and outpatient follow-up to re-image in 6 weeks.     Please see admitting H & P  by Ella Hinojosa PA-C on 1/12/2022 for full details of the encounter.     Hospital Course   Pablo Baez was admitted on 1/12/2022.  The following problems were addressed during her hospitalization:    Unwitnessed Fall, History of Multiple Falls  Thoracic Spine Fractures   Admitted after ~8 falls since December. Recurrent issue for patient. Fall on 1/11 (not evaluated, [facility]) and 1/12 when staff at [facility] were not permitted to get patient up and pt had reported being unable to get off the floor for hours, lying on her left side.   -Per  documentation Neurosurgery recommendations in the ED can follow-up with neurosurgery in 6  weeks.  -no uncontrolled pain with regimen of topical therapy, APAP     Parkinson's disease  Previously followed with [facility] neurology, Dr. Hinojosa.  Diagnosed in 2018. Has history of hallucinations with this, insomnia, difficulty with oral secretions, intermittent oral dyskinesia, gait instability     - Continue Seroquel, 25 mg at bedtime.  Given concern for lethargy and contribution to falls reduced to 12.5 mg at bedtime.  May need to titrate back up to previous dose if insomnia and hallucinations again become a problem  - resume sinemet at previous schedule  - Will need close outpatient neurology follow-up  - soft diet, SLP was consulted and recommended ongoing SLP therapies for chronic dysphagia.  Easy to chew diet with thin liquids/upright/avoiding straws with small bites and sips     Left Wrist Weakness, Paraesthesias  By exam concerning for possible peripheral (ulner) nerve injury from fall, extremity impinged as unable to get off left side. This is steadily improving.   No humerus fracture. Concern for cervical disease v peripheral nerve injury. Thought to precede fall on 1/12 when she was on her left side. Appears NSG reviewed MRI and symptoms are thought not to be c/w with cervical radiculopathy.   - Conservative treatment at this time recommended -.therapy, wrist splinting (to maintain function), and pain management    - follow-up examinations and electromyography (EMG) may be warranted if fails to continue to improve this may be done with outpt Neurologist as she is established with Dr. Hinojosa     Cognitive Impairment   Mild generalized volume loss and presumed chronic small vessel ischemic change on NCHCT.   - SLUMS this admission of 21/30  - would benefit from ongoing OT and Neurology follow up at discharge      Pending Results   None.    Code Status   Full Code       Primary Care Physician   Jany  Rasquinha      INTERVAL HISTORY  Doing well. Left wrist flexion intact      /66 (BP Location: Left arm)   Pulse 70   Temp 98.5  F (36.9  C) (Oral)   Resp 16   Wt 51.7 kg (114 lb)   SpO2 92%   BMI 21.54 kg/m        Constitutional:Awake, alert, no apparent distress  Respiratory:  Normal work of breathing. Lungs clear to auscultation bilaterally, no crackles or wheezing.  Cardiovascular: Regular rate and rhythm, normal S1 and S2, and no murmur appreciated.   GI: Normal bowel sounds, soft, non-distended, non-tender.   MK: No gross deformities. Reports minimal discomfort extending from wrist to elbow from left lateral fourth and fifth digits, slightly reduced hand  strength on left compared to right. Left wrist extension is limited, flexion intact. Radial pulses intact and symmetric as is sensation to light touch.  skin/Integument: No rashes, no cyanosis, no peripheral edema.  Neuro: oriented to self, place, somewhat to situation.  moving all extremities. Coordination and sensation grossly intact. Speech clear. No focal deficits.   Psych:  Needs frequent redirection with interview. No hallucinations.           Discharge Disposition   Discharged to short-term care facility  Condition at discharge: Stable    Consultations This Hospital Stay   ORTHOSIS BRACE IP CONSULT  CARE MANAGEMENT / SOCIAL WORK IP CONSULT  PHYSICAL THERAPY ADULT IP CONSULT  OCCUPATIONAL THERAPY ADULT IP CONSULT  SPEECH LANGUAGE PATH ADULT IP CONSULT  PHYSICAL THERAPY ADULT IP CONSULT  OCCUPATIONAL THERAPY ADULT IP CONSULT  SPEECH LANGUAGE PATH ADULT IP CONSULT    Time Spent on this Encounter   Reyna LOZANO PA-C, personally saw the patient today and spent greater than 30 minutes discharging this patient.    Discharge Orders      XR Thoracic Spine 2 Views     Follow-up and recommended labs and tests     Please follow up at [facility] in 6 weeks with XR prior to appointment .  Please call the clinic at  [phone] for scheduling.     General info for SNF    Length of Stay Estimate: Short Term Care: Estimated # of Days <30  Condition at Discharge: Stable  Level of care:skilled   Rehabilitation Potential: Good  Admission H&P remains valid and up-to-date: Yes  Recent Chemotherapy: N/A  Use Nursing Home Standing Orders: Yes     Mantoux instructions    Give two-step Mantoux (PPD) Per Facility Policy Yes     Follow Up and recommended labs and tests    1. Follow up with Nursing home physician.    2.  Follow-up with neurosurgery clinic as directed.  3.  Follow-up with your neurologist prior to discharging from transitional care unit to see if any medication changes need to be made to your Sinemet or Seroquel     Reason for your hospital stay    You were admitted after a fall found to have thoracic fracture as well nerve injury to your left upper extremity.  Your back pain was well controlled.  You were recommended to follow-up in the clinic to repeat back imaging with neurosurgery.  No surgery was recommended or bracing.  We recommend that you receive ongoing physical therapy and Occupational Therapy to prevent further falls.  Your nerve injury on your left upper extremity continues to improve.  We recommend continuing occupational therapy and will consider an EMG or further work-up with your neurologist if fails to resolve.     Activity - Up with assistive device     Activity - Up with nursing assistance     Physical Therapy Adult Consult    Evaluate and treat as clinically indicated.    Reason: Frequent falls and thoracic compression fracture     Occupational Therapy Adult Consult    Evaluate and treat as clinically indicated.    Reason: Cognitive, ADL capability and fall prevention     Speech Language Path Adult Consult    Evaluate and treat as clinically indicated.    Reason: Oral dyskinesia secondary to Parkinson's disease     Fall precautions     Diet    Follow this diet upon discharge: Orders Placed This  Encounter      Combination Diet Easy to Chew (level 7); Thin Liquids (level 0) (No straws)     Discharge Medications   Current Discharge Medication List      START taking these medications    Details   ibuprofen (ADVIL/MOTRIN) 400 MG tablet Take 1 tablet (400 mg) by mouth every 6 hours as needed for moderate pain    Associated Diagnoses: Closed fracture of second thoracic vertebra, unspecified fracture morphology, initial encounter (H)         CONTINUE these medications which have CHANGED    Details   !! QUEtiapine (SEROQUEL) 25 MG tablet Take 0.5 tablets (12.5 mg) by mouth At Bedtime    Associated Diagnoses: Closed fracture of second thoracic vertebra, unspecified fracture morphology, initial encounter (H)       !! - Potential duplicate medications found. Please discuss with provider.      CONTINUE these medications which have NOT CHANGED    Details   acetaminophen (TYLENOL) 500 MG tablet Take 1,000 mg by mouth 3 times daily      carbidopa-levodopa (SINEMET CR)  MG CR tablet Take 1 tablet by mouth daily at 11pm.  Indications: Parkinson's Disease      !! carbidopa-levodopa (SINEMET)  MG tablet Take 1.5 tablets by mouth 2 times daily AM and noon      !! carbidopa-levodopa (SINEMET)  MG tablet Take 2 tablets by mouth 2 times daily Afternoon and bedtime      cholecalciferol 50 MCG (2000 UT) tablet Take 2,000 Units by mouth daily      !! glycopyrrolate (ROBINUL) 1 MG tablet Take 1 mg by mouth 2 times daily as needed for other (salivation)      !! glycopyrrolate (ROBINUL) 1 MG tablet Take 1 tablet by mouth every morning      neomycin-polymixin-dexamethasone (MAXITROL) ophthalmic ointment Place 0.25 inches into both eyes At Bedtime      polyvinyl alcohol (LIQUIFILM TEARS) 1.4 % ophthalmic solution Place 1 drop into both eyes every 4 hours as needed      !! QUEtiapine (SEROQUEL) 25 MG tablet Take 12.5 mg by mouth daily as needed (hallucinations)       !! - Potential duplicate medications found. Please  discuss with provider.      STOP taking these medications       famotidine (PEPCID) 20 MG tablet Comments:   Reason for Stopping:         ketoconazole (NIZORAL) 2 % external shampoo Comments:   Reason for Stopping:         losartan (COZAAR) 25 MG tablet Comments:   Reason for Stopping:         methocarbamol (ROBAXIN) 500 MG tablet Comments:   Reason for Stopping:         omeprazole (PRILOSEC) 20 MG DR capsule Comments:   Reason for Stopping:         Pediatric Multivit-Minerals-C (GUMMI BEAR MULTIVITAMIN/MIN PO) Comments:   Reason for Stopping:         sennosides (SENOKOT) 8.6 MG tablet Comments:   Reason for Stopping:             Allergies   No Known Allergies  Data   Most Recent 3 CBC's:Recent Labs   Lab Test 01/15/22  0558 01/12/22  0908 10/06/21  0458 09/23/21  0720   WBC  --  7.1 7.4 6.7   HGB  --  13.2 10.2* 12.6   MCV  --  108* 101* 106*    326 545* 348      Most Recent 3 BMP's:  Recent Labs   Lab Test 01/15/22  0558 01/12/22  0908 10/06/21  0458 09/23/21  0720   NA  --  139 137 140   POTASSIUM  --  4.2 3.9 3.8   CHLORIDE  --  108 103 107   CO2  --  26 26 28   BUN  --  12 10 14   CR 0.52 0.52 0.63 0.68   ANIONGAP  --  5 8 5   GIOVANI  --  9.6 8.4* 9.1   GLC  --  106* 95 99     Most Recent 2 LFT's:No lab results found.  Most Recent INR's and Anticoagulation Dosing History:  Anticoagulation Dose History    There is no flowsheet data to display.       Most Recent 3 Troponin's:No lab results found.  Most Recent Cholesterol Panel:No lab results found.  Most Recent 6 Bacteria Isolates From Any Culture (See EPIC Reports for Culture Details):No lab results found.  Most Recent TSH, T4 and A1c Labs:No lab results found.  Results for orders placed or performed during the hospital encounter of 01/12/22   XR Pelvis and Hip Left 1 View    Narrative    XR PELVIS AND HIP LEFT 1 VIEW 1/12/2022 10:30 AM     HISTORY: L hip pain    COMPARISON: None.      Impression    IMPRESSION: Bipolar left hip arthroplasty with cerclage  wire.  Components are well seated. Cannulated screw fixation across an old  healed fracture of the right femoral neck. Osteopenia. Degenerative  changes in the right hip, both SI joints and lower lumbar spine.    ISHMAEL GRADY MD         SYSTEM ID:  [id number]   CT Head w/o Contrast    Narrative    CT SCAN OF THE HEAD WITHOUT CONTRAST   1/12/2022 10:18 AM     HISTORY: Fall. Pain. Head trauma.    TECHNIQUE:  Axial images of the head and coronal reformations without  IV contrast material. Radiation dose for this scan was reduced using  automated exposure control, adjustment of the mA and/or kV according  to patient size, or iterative reconstruction technique.    COMPARISON: Head CT 9/22/2021.    FINDINGS: There is no evidence of intracranial hemorrhage, mass, acute  infarct or anomaly. The ventricles are normal in size, shape and  configuration. Mild diffuse parenchymal volume loss. Mild patchy  periventricular white matter hypodensities which are nonspecific, but  likely related to chronic microvascular ischemic disease.     The visualized portions of the sinuses and mastoids appear normal. The  bony calvarium and bones of the skull base appear intact.       Impression    IMPRESSION:     1. No evidence of acute intracranial hemorrhage, mass, or herniation.  2. Mild diffuse parenchymal volume loss and white matter changes  likely due to chronic microvascular ischemic disease.      AGUSTO JOVEL MD         SYSTEM ID:  [id number]   CT Cervical Spine w/o Contrast    Narrative    CT CERVICAL SPINE WITHOUT CONTRAST January 12, 2022 10:24 AM     HISTORY: Fall. Neck trauma. Pain.     TECHNIQUE: Axial images of the cervical spine were obtained without  intravenous contrast. Multiplanar reformations were performed.  Radiation dose for this scan was reduced using automated exposure  control, adjustment of the mA and/or kV according to patient size, or  iterative reconstruction technique.    COMPARISON: Cervical spine CT  9/22/2021.    FINDINGS: Extensive spinal fusion hardware again seen throughout the  cervical spine. Anterior plate and screw fixation hardware is present  from C3 to C6. Corpectomy changes are again present at C4.  Intervertebral disc spacers are seen from C3 to C6. Hardware appears  intact and in similar alignment to prior CT. No lucency around the  surgical screws to suggest loosening.    New lucent fracture line at the superior T2 endplate to the right.  There is subtle deformity of the superior right T2 endplate. This was  not present on 9/22/2021. No posterior displacement of the fractured  vertebral body towards the spinal canal. Fracture line does not appear  to extend into the pedicles.    Marked degenerative endplate changes and loss of disc height at C2-C3,  C6-C7, and T2-T3. Moderate spinal canal narrowing at C2-C3 from disc  osteophyte as well as facet hypertrophy. Mild spinal canal narrowing  at C6-C7. Multiple levels of neural foraminal narrowing, most  pronounced at C2-C3 and C6-C7. Overall, degenerative findings appear  similar to prior CT 9/22/2021.    Visualized paraspinous tissues: Unremarkable.      Impression    IMPRESSION:   1. New subtle acute lucent fracture line through the superior right T2  endplate. Subtle loss of T2 vertebral body height on the right. No  posterior displacement of the fractured T2 vertebral body towards the  spinal canal. This fracture was not present on prior CT 9/22/2021.  2. No other fractures appreciated within the cervical spinal vertebral  bodies.  3. Extensive spinal fusion hardware in the cervical spine from C3 to  C6. Surgical hardware appears intact and in similar alignment to  prior. No evidence of loosening of the hardware.  4. Degenerative changes in the cervical spine as detailed above, most  pronounced at C2-C3 and C6-C7. These appear fairly similar to prior CT  9/22/2021.    Results discussed with Luis M Sosa on 1/12/2022 at 10:39 AM    AGUSTO ALEXANDRE  MD MED         SYSTEM ID:  [id number]   XR Chest 1 View    Narrative    CHEST ONE VIEW   1/12/2022 10:29 AM     HISTORY: Fall/pain.    COMPARISON: Chest CT on 9/22/2021.      Impression    IMPRESSION: Single AP view of the chest was obtained.  Cardiomediastinal silhouette is within normal limits. No suspicious  focal pulmonary opacities. No significant pleural effusion or  pneumothorax. Post surgical changes of lower cervical spine.  Multilevel degenerative changes of the spine. No suspicious osseous  lesion. If clinical suspicion of rib fractures, remains high, rib  series is more sensitive for detection of subtle rib fractures.    KANNAN BRITO MD         SYSTEM ID:  [id number]   CT Thoracic Spine w/o Contrast    Narrative    CT THORACIC SPINE WITHOUT CONTRAST  1/12/2022 11:38 AM     HISTORY: Spine fracture, thoracic, traumatic.     TECHNIQUE: Axial images of the thoracic spine were obtained without  intravenous contrast. Multiplanar reformations were performed.   Radiation dose for this scan was reduced using automated exposure  control, adjustment of the mA and/or kV according to patient size, or  iterative reconstruction technique.    COMPARISON: None.    FINDINGS:  Exaggerated thoracic kyphosis.    Acute-appearing lucent fracture line through the right aspect of the  T2 vertebral body. The fracture line involves the superior endplate.  There is mild loss of T2 vertebral body height on the right. This  fracture is also seen on cervical spine CT earlier today.    Loss of 25% T5 vertebral body height with sclerosis throughout the T5  vertebral body. This could represent a more subacute fracture.    Acute-appearing lucent fracture lines through the posterior spinous  processes of T6 and T7 (sagittal images 46 and 47 and axial image 71).  These may be more acute in age.    Fracture deformity of the anterior left fourth rib (axial image 106).  Subtle bony bridging around this fracture suggesting that it is  more  subacute in age. Chronic-appearing fracture deformities of the  posterior left eighth and ninth ribs.    Mild to moderate degenerative endplate changes and loss of disc height  throughout the thoracic spine. Calcified disc herniation at T10-T11.  No significant spinal canal narrowing in the thoracic spine. Facets  appear fused in the midthoracic spine, particularly from T4 through  T6. Moderate facet hypertrophy in the upper thoracic spine, right  greater than left, particularly at T2-T3 and T3-T4 causing at least  moderate neural foraminal narrowings.    Visualized paraspinous soft tissues are unremarkable.      Impression    IMPRESSION:     1. Acute-appearing lucent fracture line through the T2 vertebral body  involving the superior right endplate with slight loss of T2 vertebral  body height. This fracture was also seen on cervical spine CT from the  same day.  2. Fracture of the T5 vertebral body with sclerosis throughout this  vertebral body and loss of approximately 25% vertebral body height.  This may be more subacute in age given its sclerosis.  3. Lucent fracture lines of the posterior spinous processes of T6 and  T7 which are at least moderately displaced. These may be acute or  subacute as no surrounding bony bridging is appreciated.  4. Subacute-appearing fracture of the anterior left fourth rib which  is displaced but has mild surrounding bony bridging.  5. Chronic-appearing fracture deformities of the posterior left eighth  and ninth ribs.  6. Degenerative changes in the thoracic spine as detailed above.    AGUSTO JOVEL MD         SYSTEM ID:  [id number]   Cervical spine MRI w/o contrast    Narrative    MRI CERVICAL SPINE WITHOUT CONTRAST 1/12/2022 3:19 PM     HISTORY: Neck trauma, spinal or nerve root injury. Pain. Weakness.  Known fracture.    TECHNIQUE: Multiplanar, multisequence MRI of the cervical spine  without contrast.     COMPARISON: Cervical spine CT from earlier the same day.     FINDINGS:  Extensive spinal fusion hardware throughout the cervical  spine extending from C3 through C6. Metal hardware causes artifact and  limits evaluation in those areas. Cervical spine alignment is grossly  within normal limits. No obvious edema or masslike lesions appreciated  within the visualized cervical vertebral bodies noting evaluation  given the metal artifact.    At C2-C3, there is marked degenerative endplate changes and loss of  disc height. Circumferential disc bulge with prominent endplate  osteophytic spurring. There is also prominent facet hypertrophy at  this level. This results in moderate spinal canal narrowing as well as  severe bilateral neural foraminal narrowing.    At C6-C7, there is marked loss of disc height with degenerative  endplate changes. Circumferential disc bulge with endplate osteophytic  spurring. Normal facets. Mild-to-moderate spinal canal narrowing.  Moderate to severe bilateral neural foraminal narrowing.    No abnormal T2 signal or edema appreciated within the cervical spinal  cord. No abnormal fluid collections appreciated along the cervical  spinal canal.    Please see dedicated thoracic spine MRI report for details of the  fracture in the T2 vertebral body.    Paraspinous soft tissues are unremarkable.       Impression    IMPRESSION:    1. Extensive spinal fusion hardware in the cervical spine from C3 to  C6. This metal hardware causes artifact and limits evaluation by MRI.  2. No abnormal edema within the cervical vertebral bodies to suggest  acute fracture.  3. Marked degenerative changes in the cervical spine at C2-3 and C6-7  as detailed above. There is moderate spinal canal narrowing at C2-3  and mild to moderate spinal canal narrowing at C6-7.  4. No abnormal edema appreciated in the cervical spinal cord.  5. Please see dedicated thoracic spine MRI report for details of the  T2 vertebral body fracture.      AGUSTO JOVEL MD         SYSTEM ID:  [id number]   Thoracic spine MRI  w/o contrast    Narrative    MRI THORACIC SPINE WITHOUT CONTRAST  1/12/2022 3:21 PM     HISTORY: Spine fracture, thoracic, traumatic. Pain. Weakness.    TECHNIQUE: Multiplanar multisequence MRI of the thoracic spine without  contrast.    COMPARISON: Thoracic spine CT from earlier the same day.    FINDINGS:  Exaggerated kyphotic curvature of the thoracic spine.    Acute appearing fracture of the T2 vertebral body with edema and  fracture line seen at the superior right endplate. Mild loss of T2  vertebral body height. This fracture is better depicted on recent CT  scan.    Fracture of the T5 vertebral body with mild loss of T5 vertebral body  height. There is no significant edema appreciated within the T5  vertebral body and this may be more subacute to chronic in age. Slight  posterior buckling of the mid aspect of the T5 vertebral body towards  the spinal canal without significant spinal canal narrowing.    Known fractures involving the posterior spinous processes of T6 and T7  were much better appreciated by MR. Questionable edema within the  fractured fragments on the sagittal STIR images (series 5 image 12).  These may be more acute to subacute in age.    No other significant loss of vertebral body height. Several small  presumed Schmorl's node deformities in the thoracic spine. T2 and  mildly T1 hyperintense lesion in the T12 vertebral body is favored  represent a venous vascular malformation (previously termed  hemangioma).    Moderate degenerative endplate changes and loss of disc height at  T2-T3 where there is a right foraminal disc protrusion. Right greater  than left facet hypertrophy in the upper thoracic spine. Moderate to  severe right neural foraminal narrowings at T2-3 and T3-4.    No significant spinal canal narrowing at any level. No abnormal T2  signal or edema appreciated in the thoracic spinal cord. No abnormal  fluid collection appreciated within the thoracic spinal canal.     Paraspinous soft  tissues are unremarkable.      Impression    IMPRESSION:    1. Acute to subacute appearing fracture of the T2 vertebral body which  demonstrates edema in the vertebral body. This fracture was better  depicted on recent thoracic spine CT report.  2. No significant edema in the fractured T5 vertebral body which  demonstrates mild loss of vertebral body height. This is favored to be  more subacute to chronic in age given the lack of edema.  3. Question mild edema within the fractured posterior spinous  processes of T6 and T7. Again these were better depicted on thoracic  spine CT.  4. No abnormal edema within the spinal cord. No significant spinal  canal narrowing in the thoracic spine.  5. Degenerative changes in the upper thoracic spine as detailed above  particularly at T2-3 and T3-4 which cause moderate to severe right  neural foraminal narrowings.      AGUSTO JOVEL MD         SYSTEM ID:  [id number]   XR Humerus Left G/E 2 Views    Narrative    EXAM: XR HUMERUS LEFT G/E 2 VIEWS  LOCATION: [facility]  DATE/TIME: 1/12/2022 5:19 PM    INDICATION: Left humerus pain with peripheral nerve weakness in left wrist dorsiflexion.  COMPARISON: 07/01/2021.      Impression    IMPRESSION: Left upper arm negative for fracture or bone lesion. No evidence of glenohumeral or elbow joint alignment. Generalized demineralization.       Reyna Foster PA-C  [facility]

## 2022-01-17 NOTE — PROGRESS NOTES
Care Management Discharge Note    Discharge Date: 01/17/2022       Discharge Disposition: Transitional Care    Discharge Services: None    Discharge DME: None    Discharge Transportation: family or friend will provide    Private pay costs discussed: family informed Ins covers co-pay up to 30 days, after 30 days at U family will need to cover copays.     PAS Confirmation Code: [id number]  Patient/family educated on Medicare website which has current facility and service quality ratings: no    Education Provided on the Discharge Plan:  yes  Persons Notified of Discharge Plans: patient and daughter, Maye  Patient/Family in Agreement with the Plan: yes    Handoff Referral Completed: No    Additional Information:  [facility] has bed available today. Patient discharging to [facility]. Pt/family agreeable with discharge plan. [facility] will provide transportation at 1pm.     Angelica Grubbs RN Case Manager  Inpatient Care Coordination   [facility]   [phone]          Angelica Grubbs RN

## 2022-01-17 NOTE — PLAN OF CARE
PRIMARY DIAGNOSIS: GENERALIZED WEAKNESS/ FALLS     OUTPATIENT/OBSERVATION GOALS TO BE MET BEFORE DISCHARGE  1. Orthostatic performed: No     2. Tolerating PO medications: Yes     3. Return to near baseline physical activity: Yes     4. Cleared for discharge by consultants (if involved): Yes    BP (!) 146/71 (BP Location: Left arm)   Pulse 71   Temp 99  F (37.2  C) (Oral)   Resp 14   Wt 51.7 kg (114 lb)   SpO2 97%   BMI 21.54 kg/m         Discharge Planner Nurse   Safe discharge environment identified: Yes  Barriers to discharge: Yes       Entered by:Nadia Lopez RN on 1/16/2022 at 9:53 PM   Vital signs:  Alert and oriented x4. Reported chronic back pain better this morning and rated at 3/10. Scheduled Tylenol given. On level 7 easy to chew diet with thin liquid diet. Assist of x1 with a walker and galt belt. Did better with PT yesterday. Able to walk with pt. was confused last night but awake and oriented x4 today. Shower given per pt request.  Bed alarm on. TCU recommended by PT. Plan to discharge to [facility] TCU on Monday 7/17 after 1pm. Will continue to monitor.     Please review provider order for any additional goals.   Nurse to notify provider when observation goals have been met and patient is ready for discharge.

## 2022-01-17 NOTE — PLAN OF CARE
PRIMARY DIAGNOSIS: GENERALIZED WEAKNESS/ FALLS    OUTPATIENT/OBSERVATION GOALS TO BE MET BEFORE DISCHARGE  1. Orthostatic performed: No    2. Tolerating PO medications: Yes    3. Return to near baseline physical activity: Yes    4. Cleared for discharge by consultants (if involved): Yes    Discharge Planner Nurse   Safe discharge environment identified: Yes  Barriers to discharge: Yes       Entered by: LILI AIKEN 01/16/2022   Vital signs:  Temp: 99.1  F (37.3  C) Temp src: Oral BP: 133/60 Pulse: 79   Resp: 16 SpO2: 97 % O2 Device: None (Room air)  Alert and oriented x4. Reported chronic back pain better this morning and rated at 3/10. Scheduled Tylenol given. SW/OT/PT/Speech following. On level 7 easy to chew diet with thin liquid diet. Assist of x1 with a walker and galt belt. Did better with PT yesterday. Able to walk with pt. was confused last night but awake and oriented x4 today. Shower given per pt request.  Bed alarm on. TCU recommended by PT. Plan to discharge to [facility] TCU on Monday 7/17 after 1pm. Will continue to monitor.     Please review provider order for any additional goals.   Nurse to notify provider when observation goals have been met and patient is ready for discharge.

## 2022-01-17 NOTE — PLAN OF CARE
PRIMARY DIAGNOSIS: Falls/Placement     OUTPATIENT/OBSERVATION GOALS TO BE MET BEFORE DISCHARGE  1. Orthostatic performed: No    2. Tolerating PO medications: Yes    3. Return to near baseline physical activity: Yes    4. Cleared for discharge by consultants (if involved): Yes    Discharge Planner Nurse   Safe discharge environment identified: Yes  Barriers to discharge: No       Entered by: Ignacio Mast 01/17/2022      Please review provider order for any additional goals.   Nurse to notify provider when observation goals have been met and patient is ready for discharge.      Pt is A&O x3 with slight confusion noted. Pt is SBA, gait belt, and walker. Plan for pt to discharge to [facility] tcu today

## 2022-01-17 NOTE — PLAN OF CARE
PRIMARY DIAGNOSIS: Falls/Placement     OUTPATIENT/OBSERVATION GOALS TO BE MET BEFORE DISCHARGE  1. Orthostatic performed: No    2. Tolerating PO medications: Yes    3. Return to near baseline physical activity: Yes    4. Cleared for discharge by consultants (if involved): Yes    Discharge Planner Nurse   Safe discharge environment identified: Yes  Barriers to discharge: No       Entered by: Ignacio Mast 01/17/2022      Please review provider order for any additional goals.   Nurse to notify provider when observation goals have been met and patient is ready for discharge.      Pt is A&O x3 with slight confusion noted. Pt is SBA, gait belt, and walker. Plan for pt to discharge to [facility] tcu today

## 2022-01-17 NOTE — PLAN OF CARE
Speech Language Therapy Discharge Summary    Reason for therapy discharge:    Discharged to transitional care facility.    Progress towards therapy goal(s). See goals on Care Plan in [facility] electronic health record for goal details.  Goals partially met.  Barriers to achieving goals:   discharge from facility.    Therapy recommendation(s):    Continued therapy is recommended.  Rationale/Recommendations: Oropharyngeal dysphagia likely chronic given hx of ACDF and Parkinson's disease. Recommend easy to chew diet (7) and thin liquids. Pt must be completely upright, avoid using straws, small bites/sips, slow rate. Pt likes easy to chew/likely her baseline. May benefit from short-term education follow up.

## 2022-01-18 ENCOUNTER — PATIENT OUTREACH (OUTPATIENT)
Dept: CARE COORDINATION | Facility: CLINIC | Age: 77
End: 2022-01-18
Payer: MEDICARE

## 2022-01-18 DIAGNOSIS — Z71.89 OTHER SPECIFIED COUNSELING: ICD-10-CM

## 2022-01-18 PROCEDURE — 36415 COLL VENOUS BLD VENIPUNCTURE: CPT | Performed by: GENERAL PRACTICE

## 2022-01-18 PROCEDURE — P9604 ONE-WAY ALLOW PRORATED TRIP: HCPCS | Performed by: GENERAL PRACTICE

## 2022-01-18 PROCEDURE — 86481 TB AG RESPONSE T-CELL SUSP: CPT | Performed by: GENERAL PRACTICE

## 2022-01-18 NOTE — PLAN OF CARE
"Occupational Therapy Discharge Summary    Reason for therapy discharge:    Discharged to transitional care facility.    Progress towards therapy goal(s). See goals on Care Plan in [facility] electronic health record for goal details.  Goals not met.  Barriers to achieving goals:   discharge from facility.    Therapy recommendation(s):    Continued therapy is recommended.  Rationale/Recommendations:  per treating OT \"pt below baseline function in ADL and self cares. has assistance at baseline but currently high fall risk and requiring A of 1 for all mobility and ADL where at baseline she is somewhat indp with mobility. rec return to home if assisted living can provide A of 1 for all mobility with FWW and ADL. if assistance at home not possible then pt may benefit from TCU stay.    **This patient was not seen by writing OT, information for discharge summary taken from treating OT's notes.**    "

## 2022-01-18 NOTE — PROGRESS NOTES
Clinic Care Coordination Contact    Background: Care Coordination referral placed from [facility] discharge report for reason of patient meeting criteria for a TCM outreach call by [facility] team.    Assessment: Upon chart review, [facility] Team member will cancel/close the referral for TCM outreach due to reason below:    Patient is not established within [facility]. Upon chart review, [facility] Team member noted patient discharged to TCU    Plan: Care Coordination referral for TCM outreach canceled.    Leny Nieto  [facility], [facility]

## 2022-01-19 LAB
GAMMA INTERFERON BACKGROUND BLD IA-ACNC: 0.02 IU/ML
M TB IFN-G BLD-IMP: NEGATIVE
M TB IFN-G CD4+ BCKGRND COR BLD-ACNC: 9.98 IU/ML
MITOGEN IGNF BCKGRD COR BLD-ACNC: 0 IU/ML
MITOGEN IGNF BCKGRD COR BLD-ACNC: 0.02 IU/ML
QUANTIFERON MITOGEN: 10 IU/ML
QUANTIFERON NIL TUBE: 0.02 IU/ML
QUANTIFERON TB1 TUBE: 0.04 IU/ML
QUANTIFERON TB2 TUBE: 0.02

## 2022-02-11 ENCOUNTER — MEDICAL CORRESPONDENCE (OUTPATIENT)
Dept: HEALTH INFORMATION MANAGEMENT | Facility: CLINIC | Age: 77
End: 2022-02-11

## 2022-02-24 ENCOUNTER — OFFICE VISIT (OUTPATIENT)
Dept: NEUROSURGERY | Facility: CLINIC | Age: 77
End: 2022-02-24
Attending: PHYSICIAN ASSISTANT
Payer: MEDICARE

## 2022-02-24 ENCOUNTER — ANCILLARY PROCEDURE (OUTPATIENT)
Dept: GENERAL RADIOLOGY | Facility: CLINIC | Age: 77
End: 2022-02-24
Attending: NURSE PRACTITIONER
Payer: MEDICARE

## 2022-02-24 VITALS — HEART RATE: 76 BPM | OXYGEN SATURATION: 97 % | DIASTOLIC BLOOD PRESSURE: 78 MMHG | SYSTOLIC BLOOD PRESSURE: 155 MMHG

## 2022-02-24 DIAGNOSIS — S32.020D COMPRESSION FRACTURE OF L2 VERTEBRA WITH ROUTINE HEALING, SUBSEQUENT ENCOUNTER: Primary | ICD-10-CM

## 2022-02-24 DIAGNOSIS — S22.029A CLOSED FRACTURE OF SECOND THORACIC VERTEBRA, UNSPECIFIED FRACTURE MORPHOLOGY, INITIAL ENCOUNTER (H): ICD-10-CM

## 2022-02-24 PROCEDURE — G0463 HOSPITAL OUTPT CLINIC VISIT: HCPCS

## 2022-02-24 PROCEDURE — 99213 OFFICE O/P EST LOW 20 MIN: CPT | Performed by: PHYSICIAN ASSISTANT

## 2022-02-24 PROCEDURE — 72070 X-RAY EXAM THORAC SPINE 2VWS: CPT | Performed by: RADIOLOGY

## 2022-02-24 ASSESSMENT — PAIN SCALES - GENERAL: PAINLEVEL: NO PAIN (0)

## 2022-02-24 NOTE — LETTER
2/24/2022         RE: Pablo Baez  [address]  [place] [id number]        Dear Colleague,    Thank you for referring your patient, Pablo Baez, to the [facility] [place]. Please see a copy of my visit note below.    NEUROSURGERY CLINIC PROGRESS NOTE    DATE OF VISIT: 2/24/2022    HPI:     Pablo Baez is a pleasant 76 year old female who presents to the clinic today for a six-week follow-up visit. On 01/12/2022 the patient unfortunately fell. It is not clear what caused her fall as she does have Parkinson's disease. This last fall resulted in a T2 compression fracture.   Today, the patient reports that she has not experience falls since our initial evaluation and that she has no pain or any other concerns.     Current Outpatient Medications   Medication     acetaminophen (TYLENOL) 500 MG tablet     carbidopa-levodopa (SINEMET CR)  MG CR tablet     carbidopa-levodopa (SINEMET)  MG tablet     carbidopa-levodopa (SINEMET)  MG tablet     cholecalciferol 50 MCG (2000 UT) tablet     glycopyrrolate (ROBINUL) 1 MG tablet     glycopyrrolate (ROBINUL) 1 MG tablet     ibuprofen (ADVIL/MOTRIN) 400 MG tablet     Lidocaine (LIDOCARE) 4 % Patch     neomycin-polymixin-dexamethasone (MAXITROL) ophthalmic ointment     polyvinyl alcohol (LIQUIFILM TEARS) 1.4 % ophthalmic solution     QUEtiapine (SEROQUEL) 25 MG tablet     QUEtiapine (SEROQUEL) 25 MG tablet     No current facility-administered medications for this visit.       Allergies   Allergen Reactions     Lisinopril Cough       No past medical history on file.    Review Of Systems    Skin: negative  Eyes: negative  Ears/Nose/Throat: negative  Respiratory: No shortness of breath, dyspnea on exertion, cough, or hemoptysis  Cardiovascular: negative  Gastrointestinal: negative  Musculoskeletal: negative  Neurologic: negative  Psychiatric: negative  Hematologic/Lymphatic/Immunologic:  negative  Endocrine: negative    OBJECTIVE:    BP (!) 155/78   Pulse 76   SpO2 97%     Imaging:    XR thoracic spine 02/24/2022 - 11:00    Radiographic Findings: Full radiological report in chart. I personally reviewed the images with the patient today.    Exam:    Patient appears comfortable and in no apparent distress. Moving all extremities.  Gait is non-antalgic - walker.  CN II-XII grossly intact, alert and appropriate with conversation and following  commands  Bilateral upper extremities with full strength including hand intrinsics and grasp with the exception of left wrist extension weakness.  Sensation intact throughout.  Bilateral lower extremities 5/5 strength including plantar and dorsiflexion.  Normal sensation throughout bilaterally.    PLAN:    Pablo Baez is six weeks out from a  fall resulting in a T2 compression fracture. Today the patient reports that she has not experience falls since our initial evaluation and that she has no pain or any other concerns. Imaging was reviewed today and all questions were answered.    The patient has remained compliant with the restrictions which included  not lifting anything greater than 5-10 lbs. Today we discussed increasing activity from 10 lbs by 2-5 lbs per week, but encouraged continuing to avoid excessive bending, twisting, and turning and to avoid jostling and jarring activities.     The patient gave verbal understanding and is in agreement with the above plan. He will call or return to the clinic for any worsening or changes in symptoms.    Respectfully,     SAROJ Andrade PA-C      Again, thank you for allowing me to participate in the care of your patient.        Sincerely,        Roland Andre PA-C

## 2022-02-24 NOTE — NURSING NOTE
"Pablo Baez is a 76 year old female who presents for:  Chief Complaint   Patient presents with     RECHECK     6 week f/u x-ray review        Vitals:    Vitals:    02/24/22 1100   BP: (!) 155/78   Pulse: 76   SpO2: 97%       BMI:  Estimated body mass index is 21.54 kg/m  as calculated from the following:    Height as of 9/22/21: 5' 1\" (1.549 m).    Weight as of 1/12/22: 114 lb (51.7 kg).    Pain Score:  No Pain (0)        Amendo Phorn      "

## 2022-02-24 NOTE — PROGRESS NOTES
NEUROSURGERY CLINIC PROGRESS NOTE    DATE OF VISIT: 2/24/2022    HPI:     Pablo Baez is a pleasant 76 year old female who presents to the clinic today for a six-week follow-up visit. On 01/12/2022 the patient unfortunately fell. It is not clear what caused her fall as she does have Parkinson's disease. This last fall resulted in a T2 compression fracture.   Today, the patient reports that she has not experience falls since our initial evaluation and that she has no pain or any other concerns.     Current Outpatient Medications   Medication     acetaminophen (TYLENOL) 500 MG tablet     carbidopa-levodopa (SINEMET CR)  MG CR tablet     carbidopa-levodopa (SINEMET)  MG tablet     carbidopa-levodopa (SINEMET)  MG tablet     cholecalciferol 50 MCG (2000 UT) tablet     glycopyrrolate (ROBINUL) 1 MG tablet     glycopyrrolate (ROBINUL) 1 MG tablet     ibuprofen (ADVIL/MOTRIN) 400 MG tablet     Lidocaine (LIDOCARE) 4 % Patch     neomycin-polymixin-dexamethasone (MAXITROL) ophthalmic ointment     polyvinyl alcohol (LIQUIFILM TEARS) 1.4 % ophthalmic solution     QUEtiapine (SEROQUEL) 25 MG tablet     QUEtiapine (SEROQUEL) 25 MG tablet     No current facility-administered medications for this visit.       Allergies   Allergen Reactions     Lisinopril Cough       No past medical history on file.    Review Of Systems    Skin: negative  Eyes: negative  Ears/Nose/Throat: negative  Respiratory: No shortness of breath, dyspnea on exertion, cough, or hemoptysis  Cardiovascular: negative  Gastrointestinal: negative  Musculoskeletal: negative  Neurologic: negative  Psychiatric: negative  Hematologic/Lymphatic/Immunologic: negative  Endocrine: negative    OBJECTIVE:    BP (!) 155/78   Pulse 76   SpO2 97%     Imaging:    XR thoracic spine 02/24/2022 - 11:00    Radiographic Findings: Full radiological report in chart. I personally reviewed the images with the patient today.    Exam:    Patient appears comfortable  and in no apparent distress. Moving all extremities.  Gait is non-antalgic - walker.  CN II-XII grossly intact, alert and appropriate with conversation and following  commands  Bilateral upper extremities with full strength including hand intrinsics and grasp with the exception of left wrist extension weakness.  Sensation intact throughout.  Bilateral lower extremities 5/5 strength including plantar and dorsiflexion.  Normal sensation throughout bilaterally.    PLAN:    Pablo Baez is six weeks out from a  fall resulting in a T2 compression fracture. Today the patient reports that she has not experience falls since our initial evaluation and that she has no pain or any other concerns. Imaging was reviewed today and all questions were answered.    The patient has remained compliant with the restrictions which included  not lifting anything greater than 5-10 lbs. Today we discussed increasing activity from 10 lbs by 2-5 lbs per week, but encouraged continuing to avoid excessive bending, twisting, and turning and to avoid jostling and jarring activities.     The patient gave verbal understanding and is in agreement with the above plan. He will call or return to the clinic for any worsening or changes in symptoms.    Respectfully,     SAROJ Andrade, LEVON

## 2022-03-01 ENCOUNTER — LAB REQUISITION (OUTPATIENT)
Dept: LAB | Facility: CLINIC | Age: 77
End: 2022-03-01
Payer: MEDICARE

## 2022-03-01 DIAGNOSIS — K59.00 CONSTIPATION, UNSPECIFIED: ICD-10-CM

## 2022-03-02 ENCOUNTER — LAB REQUISITION (OUTPATIENT)
Dept: LAB | Facility: CLINIC | Age: 77
End: 2022-03-02
Payer: MEDICARE

## 2022-03-02 DIAGNOSIS — K59.00 CONSTIPATION, UNSPECIFIED: ICD-10-CM

## 2022-03-03 LAB — TSH SERPL DL<=0.005 MIU/L-ACNC: 0.54 UIU/ML (ref 0.3–5)

## 2022-03-03 PROCEDURE — 36415 COLL VENOUS BLD VENIPUNCTURE: CPT | Mod: ORL

## 2022-03-03 PROCEDURE — 84443 ASSAY THYROID STIM HORMONE: CPT | Mod: ORL | Performed by: NURSE PRACTITIONER

## 2022-03-03 PROCEDURE — P9604 ONE-WAY ALLOW PRORATED TRIP: HCPCS | Mod: ORL

## 2022-03-03 PROCEDURE — 36415 COLL VENOUS BLD VENIPUNCTURE: CPT | Mod: ORL | Performed by: NURSE PRACTITIONER

## 2022-03-03 PROCEDURE — P9604 ONE-WAY ALLOW PRORATED TRIP: HCPCS | Mod: ORL | Performed by: NURSE PRACTITIONER

## 2022-03-03 PROCEDURE — 84443 ASSAY THYROID STIM HORMONE: CPT | Mod: ORL

## 2022-03-09 LAB — TSH SERPL DL<=0.005 MIU/L-ACNC: 0.91 UIU/ML (ref 0.3–5)

## 2022-03-09 PROCEDURE — 84443 ASSAY THYROID STIM HORMONE: CPT | Mod: ORL

## 2022-03-09 PROCEDURE — 84443 ASSAY THYROID STIM HORMONE: CPT | Mod: ORL | Performed by: NURSE PRACTITIONER

## 2022-03-09 PROCEDURE — 36415 COLL VENOUS BLD VENIPUNCTURE: CPT | Mod: ORL | Performed by: NURSE PRACTITIONER

## 2022-03-09 PROCEDURE — 36415 COLL VENOUS BLD VENIPUNCTURE: CPT | Mod: ORL

## 2022-03-09 PROCEDURE — P9604 ONE-WAY ALLOW PRORATED TRIP: HCPCS | Mod: ORL | Performed by: NURSE PRACTITIONER

## 2022-03-09 PROCEDURE — P9604 ONE-WAY ALLOW PRORATED TRIP: HCPCS | Mod: ORL

## 2022-08-28 ENCOUNTER — LAB REQUISITION (OUTPATIENT)
Dept: LAB | Facility: CLINIC | Age: 77
End: 2022-08-28
Payer: MEDICARE

## 2022-08-28 DIAGNOSIS — Z51.81 ENCOUNTER FOR THERAPEUTIC DRUG LEVEL MONITORING: ICD-10-CM

## 2022-08-31 LAB
ANION GAP SERPL CALCULATED.3IONS-SCNC: 13 MMOL/L (ref 7–15)
BUN SERPL-MCNC: 16.7 MG/DL (ref 8–23)
CALCIUM SERPL-MCNC: 9.9 MG/DL (ref 8.8–10.2)
CHLORIDE SERPL-SCNC: 102 MMOL/L (ref 98–107)
CREAT SERPL-MCNC: 0.55 MG/DL (ref 0.51–0.95)
DEPRECATED HCO3 PLAS-SCNC: 25 MMOL/L (ref 22–29)
GFR SERPL CREATININE-BSD FRML MDRD: >90 ML/MIN/1.73M2
GLUCOSE SERPL-MCNC: 83 MG/DL (ref 70–99)
POTASSIUM SERPL-SCNC: 4.4 MMOL/L (ref 3.4–5.3)
SODIUM SERPL-SCNC: 140 MMOL/L (ref 136–145)
TSH SERPL DL<=0.005 MIU/L-ACNC: 0.62 UIU/ML (ref 0.3–4.2)

## 2022-08-31 PROCEDURE — 36415 COLL VENOUS BLD VENIPUNCTURE: CPT | Mod: ORL | Performed by: NURSE PRACTITIONER

## 2022-08-31 PROCEDURE — 84443 ASSAY THYROID STIM HORMONE: CPT | Mod: ORL | Performed by: NURSE PRACTITIONER

## 2022-08-31 PROCEDURE — P9603 ONE-WAY ALLOW PRORATED MILES: HCPCS | Mod: ORL | Performed by: NURSE PRACTITIONER

## 2022-08-31 PROCEDURE — 80053 COMPREHEN METABOLIC PANEL: CPT | Mod: ORL | Performed by: NURSE PRACTITIONER

## 2022-09-01 LAB
ALBUMIN SERPL BCG-MCNC: 4.8 G/DL (ref 3.5–5.2)
ALP SERPL-CCNC: 63 U/L (ref 35–104)
ALT SERPL W P-5'-P-CCNC: 7 U/L (ref 10–35)
ANION GAP SERPL CALCULATED.3IONS-SCNC: 13 MMOL/L (ref 7–15)
AST SERPL W P-5'-P-CCNC: 30 U/L (ref 10–35)
BILIRUB SERPL-MCNC: 0.5 MG/DL
BUN SERPL-MCNC: 16.7 MG/DL (ref 8–23)
CALCIUM SERPL-MCNC: 9.9 MG/DL (ref 8.8–10.2)
CHLORIDE SERPL-SCNC: 102 MMOL/L (ref 98–107)
CREAT SERPL-MCNC: 0.55 MG/DL (ref 0.51–0.95)
DEPRECATED HCO3 PLAS-SCNC: 25 MMOL/L (ref 22–29)
GFR SERPL CREATININE-BSD FRML MDRD: >90 ML/MIN/1.73M2
GLUCOSE SERPL-MCNC: 83 MG/DL (ref 70–99)
POTASSIUM SERPL-SCNC: 4.4 MMOL/L (ref 3.4–5.3)
PROT SERPL-MCNC: 7.2 G/DL (ref 6.4–8.3)
SODIUM SERPL-SCNC: 140 MMOL/L (ref 136–145)

## 2022-11-01 ENCOUNTER — APPOINTMENT (OUTPATIENT)
Dept: CT IMAGING | Facility: CLINIC | Age: 77
End: 2022-11-01
Attending: EMERGENCY MEDICINE
Payer: MEDICARE

## 2022-11-01 ENCOUNTER — HOSPITAL ENCOUNTER (EMERGENCY)
Facility: CLINIC | Age: 77
Discharge: HOME | End: 2022-11-01
Attending: EMERGENCY MEDICINE | Admitting: EMERGENCY MEDICINE
Payer: MEDICARE

## 2022-11-01 VITALS
HEART RATE: 69 BPM | TEMPERATURE: 97.7 F | RESPIRATION RATE: 16 BRPM | SYSTOLIC BLOOD PRESSURE: 154 MMHG | OXYGEN SATURATION: 97 % | DIASTOLIC BLOOD PRESSURE: 65 MMHG

## 2022-11-01 DIAGNOSIS — S16.1XXA STRAIN OF NECK MUSCLE, INITIAL ENCOUNTER: ICD-10-CM

## 2022-11-01 DIAGNOSIS — S01.01XA LACERATION OF SCALP, INITIAL ENCOUNTER: ICD-10-CM
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
## 2022-11-01 DIAGNOSIS — S09.90XA CLOSED HEAD INJURY, INITIAL ENCOUNTER: ICD-10-CM

## 2022-11-01 PROCEDURE — G1010 CDSM STANSON: HCPCS

## 2022-11-01 PROCEDURE — 90791 PSYCH DIAGNOSTIC EVALUATION: CPT

## 2022-11-01 PROCEDURE — 250N000013 HC RX MED GY IP 250 OP 250 PS 637: Performed by: EMERGENCY MEDICINE

## 2022-11-01 PROCEDURE — 99285 EMERGENCY DEPT VISIT HI MDM: CPT | Mod: 25

## 2022-11-01 PROCEDURE — 12011 RPR F/E/E/N/L/M 2.5 CM/<: CPT

## 2022-11-01 PROCEDURE — 12001 RPR S/N/AX/GEN/TRNK 2.5CM/<: CPT

## 2022-11-01 RX ORDER — BUPIVACAINE HYDROCHLORIDE 5 MG/ML
INJECTION, SOLUTION EPIDURAL; INTRACAUDAL
Status: DISCONTINUED
Start: 2022-11-01 | End: 2022-11-01 | Stop reason: HOSPADM

## 2022-11-01 RX ORDER — CARBIDOPA AND LEVODOPA 25; 100 MG/1; MG/1
1 TABLET ORAL ONCE
Status: COMPLETED | OUTPATIENT
Start: 2022-11-01 | End: 2022-11-01

## 2022-11-01 RX ORDER — ACETAMINOPHEN 500 MG
500 TABLET ORAL EVERY 4 HOURS PRN
Status: DISCONTINUED | OUTPATIENT
Start: 2022-11-01 | End: 2022-11-01 | Stop reason: HOSPADM

## 2022-11-01 RX ADMIN — ACETAMINOPHEN 500 MG: 500 TABLET ORAL at 07:21

## 2022-11-01 RX ADMIN — CARBIDOPA AND LEVODOPA 1 TABLET: 25; 100 TABLET ORAL at 07:21

## 2022-11-01 ASSESSMENT — ACTIVITIES OF DAILY LIVING (ADL)
ADLS_ACUITY_SCORE: 35

## 2022-11-01 NOTE — ED PROVIDER NOTES
History     Chief Complaint:  Fall       HPI:  Pablo Baez is a 77 year old female who presents with a fall.  Patient presents from assisted living having fallen out of bed this morning.  Patient reports she was lying on her back and suddenly rolled to the right side.  She subsequently fell to the ground.  She struck her head and right side of her face though denies loss of consciousness.  She is not on anticoagulation.  She currently notes discomfort to her scalp, as well as to the left aspect of her neck.  Cervical collar applied by nursing staff on arrival to the ED.  She denies injuries to her lower back, chest, abdomen or extremities.  Has not taken any analgesia prior to arrival.  Per chart review, last tetanus updated in 2018.    Allergies:  Lisinopril     Medications:    carbidopa-levodopa (SINEMET CR)  MG CR tablet  carbidopa-levodopa (SINEMET)  MG tablet  carbidopa-levodopa (SINEMET)  MG tablet  cholecalciferol 50 MCG (2000 UT) tablet  glycopyrrolate (ROBINUL) 1 MG tablet  glycopyrrolate (ROBINUL) 1 MG tablet  neomycin-polymixin-dexamethasone (MAXITROL) ophthalmic ointment  polyvinyl alcohol (LIQUIFILM TEARS) 1.4 % ophthalmic solution  QUEtiapine (SEROQUEL) 25 MG tablet  QUEtiapine (SEROQUEL) 25 MG tablet      Past Medical History:    No past medical history on file.  Patient Active Problem List    Diagnosis Date Noted     Left hand weakness 01/12/2022     Priority: Medium     Closed fracture of second thoracic vertebra, unspecified fracture morphology, initial encounter (H) 01/12/2022     Priority: Medium     Confusion 09/22/2021     Priority: Medium     Multiple falls 09/22/2021     Priority: Medium     Laceration of scalp, initial encounter 09/22/2021     Priority: Medium     Closed fracture of multiple ribs of left side, initial encounter 09/22/2021     Priority: Medium        Past Surgical History:    No past surgical history on file.     Family History:    family history is  not on file.    Social History:   reports that she has never smoked. She has never used smokeless tobacco.    Review of Systems:  10 point ROS is negative aside from that mentioned in the HPI      Physical Exam     Patient Vitals for the past 24 hrs:   BP Temp Temp src Pulse Resp SpO2   11/01/22 1000 (!) 151/77 -- -- 71 16 94 %   11/01/22 0945 (!) 151/72 -- -- 54 -- 94 %   11/01/22 0930 (!) 141/91 -- -- 87 -- 97 %   11/01/22 0915 135/68 -- -- 54 16 96 %   11/01/22 0900 (!) 146/70 -- -- 54 -- 96 %   11/01/22 0845 (!) 159/77 -- -- 57 -- 96 %   11/01/22 0830 (!) 151/79 -- -- 62 -- 95 %   11/01/22 0815 (!) 160/77 -- -- 65 -- 95 %   11/01/22 0800 (!) 163/82 -- -- 65 -- 96 %   11/01/22 0745 (!) 171/83 -- -- 64 -- 97 %   11/01/22 0730 (!) 176/79 -- -- 71 -- 97 %   11/01/22 0525 (!) 178/85 97.7  F (36.5  C) Oral 74 18 99 %      General:   Well-nourished   Speaking in full sentences   GCS 15  Head:   Right scalp with 1 cm laceration   No active bleeding  Eyes:   Conjunctiva without injection or scleral icterus   0.6 cm laceration to superior/lateral aspect of right brow   PERRL   EOM full w/out entrapment or proptosis  ENT:   Moist mucous membranes   Posterior oropharynx clear without erythema or exudate   No dental fracture   No jaw malocclusion   Nares patent   Pinnae normal   No midface swelling, erythema, or asymmetry  Neck:   C-collar applied   No midline tenderness   Tenderness to palpation to left cervical paraspinal musculature  Resp:   Lungs CTAB   No crackles, wheezing or audible rubs   Good air movement  CV:    Normal rate, regular rhythm   S1 and S2 present   No murmur, gallop or rub  GI:   BS present   Abdomen soft without distention   Non-tender to light and deep palpation   No guarding or rebound tenderness  Skin:   Warm, dry, well perfused   Scalp laceration as noted above  MSK:   Moves all extremities   No focal deformities or swelling  Neuro:   Alert   Answers questions appropriately   Moves all extremities  equally   Gait stable with walker  Psych:   Normal affect, normal mood      Emergency Department Course   Imaging:  Radiology findings were communicated with the patient who voiced understanding of the findings.  Cervical spine CT w/o contrast   Final Result   IMPRESSION:   HEAD CT:   1.  No acute intracranial process.   2.  Right frontal and parietal scalp hematomas. Right periorbital hematoma. No fracture.      CERVICAL SPINE CT:   1.  No CT evidence for acute fracture or post traumatic subluxation.   2.  At C2-3 there is moderate to severe central canal and bilateral neural foraminal narrowing.   3.  Moderate central canal stenosis at C6-7.      Head CT w/o contrast   Final Result   IMPRESSION:   HEAD CT:   1.  No acute intracranial process.   2.  Right frontal and parietal scalp hematomas. Right periorbital hematoma. No fracture.      CERVICAL SPINE CT:   1.  No CT evidence for acute fracture or post traumatic subluxation.   2.  At C2-3 there is moderate to severe central canal and bilateral neural foraminal narrowing.   3.  Moderate central canal stenosis at C6-7.           Interventions:  Medications   acetaminophen (TYLENOL) tablet 500 mg (500 mg Oral Given 11/1/22 0721)   bupivacaine (PF) (MARCAINE) 0.5 % injection (has no administration in time range)   carbidopa-levodopa (SINEMET)  MG per tablet 1 tablet (1 tablet Oral Given 11/1/22 0721)        Procedure:      Laceration Repair      Procedure: Laceration Repair    Indication: Laceration    Consent: Verbal    Location: Right Scalp    Length: 1 cm    Preparation: Irrigation with Sterile Saline.    Anesthesia/Sedation: Bupivacaine - 0.5%      Treatment/Exploration: Wound explored, no foreign bodies found     Closure: The wound was closed with 2 staples.    Patient Status: The patient tolerated the procedure well: Yes. There were no complications.      Laceration Repair      Procedure: Laceration Repair    Indication: Laceration    Consent:  Verbal    Location: Right periorbital region    Length: 0.6 cm    Preparation: Irrigation with Sterile Saline.    Anesthesia/Sedation: Bupivacaine - 0.5%      Treatment/Exploration: Wound explored, no foreign bodies found     Closure: The wound was closed with one layer. Skin/superficial layer was closed with 2 x 6-0 Nylon using Interrupted sutures.     Patient Status: The patient tolerated the procedure well: Yes. There were no complications.      Emergency Department Course / Reassessment:  Nursing notes and vitals reviewed.  6:01 AM: I performed an exam of the patient as documented above.      The patient was sent for CT while in the emergency department, results above.      ED Course as of 11/01/22 1014   Tue Nov 01, 2022   0714 Patient updated.  Able to be cleared from cervical collar.  No midline tenderness.   0748 Patient re-checked        Discussed case with DEC.  Denies any mental health concerns.  Not felt to require inpatient psychiatric hospitalization.    I discussed the treatment plan with the patient. They expressed understanding of this plan and consented to discharge. They will be discharged home with instructions for care and follow up. In addition, the patient will return to the emergency department if their symptoms persist, worsen, if new symptoms arise or if there is any concern.  All questions were answered.    I personally reviewed the imaging and laboratory results with the Patient and answered all related questions prior to discharge.      Impression & Plan      Medical Decision Making:  Pablo Baez is a 77 year old female who presents to the ER for evaluation of a fall.  Vital signs on presentation reveal elevated BP, though are otherwise unremarkable.  History, exam, and ED course as outlined above.  History consistent with a mechanical fall.  Patient with evidence of trauma to her head.  Given advanced age as well as mechanism of injury, CT of the head and cervical spine were  obtained.  Fortunately, no evidence of intracranial hemorrhage, skull fracture, or cervical spine fracture/dislocation is appreciated.  Neurovascular status intact on initial and subsequent evaluation.  Tetanus up-to-date.  Wounds were thoroughly cleansed and closed as outlined above.  Remainder of skeletal survey negative for traumatic injuries warranting additional imaging.  Patient is ambulatory at baseline with a walker.  She was provided her home Sinemet as well as Tylenol.  During ED reassessment during laceration repair, patient was describing symptoms of seeing her  son yesterday.  She does describe feeling concerned that she has not yet seen him today.  When asked about this further, she notes occasionally seeing him, despite him being .  DEC was consulted and have graciously seen and evaluated the patient.  Please refer to their associated documentation.  They do not feel patient requires any further psychiatric evaluation and feel patient can safely be discharged to her assisted living facility.  She does appear to have she is felt stable for discharge to her assisted care facility.  Close head injury precautions discussed as well as symptoms that could suggest delayed hemorrhage.  Overall she is felt to be at lower risk for this given that she is not on anticoagulation.  She is to return to the ED with severe headache, vomiting, focal neurologic deficits, or any other new or troubling symptoms.  All questions have been answered prior to discharge.    Diagnosis:    ICD-10-CM    1. Fall, initial encounter  W19.XXXA       2. Closed head injury, initial encounter  S09.90XA       3. Laceration of scalp, initial encounter  S01.01XA       4. Strain of neck muscle, initial encounter  S16.1XXA            2022   Andre Bernard MD Roach, Brian Donald, MD  22 1027

## 2022-11-01 NOTE — ED NOTES
Pt assisted to bedside commode, assist of 1. Report given to EMS for transfer all questions answered.

## 2022-11-01 NOTE — ED NOTES
Bed: HW01  Expected date:   Expected time:   Means of arrival:   Comments:  BV1 77 Fall lac to R eye

## 2022-11-01 NOTE — ED TRIAGE NOTES
Pt arrives via EMS from assisted living facility after falling out of bed this morning. Pt denies LOC and does not take blood thinners. Pt has a laceration on her right eyebrow and right scalp. Pt also complains of neck pain. C-collar applied by nursing staff on arrival.      Triage Assessment     Row Name 11/01/22 0527       Triage Assessment (Adult)    Airway WDL WDL       Respiratory WDL    Respiratory WDL WDL       Skin Circulation/Temperature WDL    Skin Circulation/Temperature WDL WDL       Cardiac WDL    Cardiac WDL WDL       Peripheral/Neurovascular WDL    Peripheral Neurovascular WDL WDL       Cognitive/Neuro/Behavioral WDL    Cognitive/Neuro/Behavioral WDL WDL

## 2022-11-01 NOTE — ED NOTES
RN spoke with nurse from patient's care facility.  Updates on patient condition given, will call back when pt ready to discharge.

## 2022-11-01 NOTE — CONSULTS
Diagnostic Evaluation Consultation  Crisis Assessment    Patient was assessed: Salvatore  Patient location: [place]  Was a release of information signed: Yes. Providers included on the release: Felipa       Referral Data and Chief Complaint  Pablo Baez is a 77 year old, who uses she/her pronouns, and presents to the ED via EMS. Patient is referred to the ED by self. Patient is presenting to the ED for the following concerns: Hitting her head during a fall.  During assessment with emergency room provider patient's made statements surrounding seeing dead people so a DEC assessment was initiated.    Informed Consent and Assessment Methods     Patient is her own guardian. Writer met with patient and explained the crisis assessment process, including applicable information disclosures and limits to confidentiality, assessed understanding of the process, and obtained consent to proceed with the assessment. Patient was observed to be able to participate in the assessment as evidenced by Willing to engage with assessment. Assessment methods included conducting a formal interview with patient, review of medical records, collaboration with medical staff, and obtaining relevant collateral information from family and community providers when available..     Over the course of this crisis assessment offered validation, engaged patient in problem solving and disposition planning and worked with patient on safety and aftercare planning. Patient's response to interventions was receptive.     Summary of Patient Situation     Patient reports that she has lived in her current nursing facility for the past year and a half and this has been going well.  Patient was diagnosed with Parkinson's disease and has had physical deterioration that has required her to move into her current nursing facility.  Patient denies concerns regarding her mental health but reports that she has experienced hallucinations as a result of her Parkinson's  disease.  Patient reports that her hallucinations have never been frightening to her because she understands that they are not real and reports that she has not been experiencing these issues lately.  Patient expresses that she believes that when our loved ones die they are still with us in our heart.  Denies ever seeing anyone that is  and feels that the provider misunderstood what she was saying.  Patient reports that her current provider is very helpful regarding her Parkinson's disease and she feels that she is on the appropriate medication to treat her symptoms.    Brief Psychosocial History    - Current living situation: Patient has lived at [facility] for 1 1/2year. Pt reports this is a positive place to live.  - Brief family history: Pt has 2 children that live in [place] whom she sees regularly.    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    - Supports: Daughter, friends   - Relevant legal issues: None  - Cultural, Anglican, or spiritual influences on mental health care: Non- Worship     Significant Clinical History     Pt denies any mental health history. Pt notes that she has had hallucinations in the past related to her parkinson's disorder.      Collateral Information    Nurse that patient's living facility reports that she has some confusion at baseline but they have no concern with her returning to their facility when she is medically clear.     Risk Assessment  ESS-6  1.a. Over the past 2 weeks, have you had thoughts of killing yourself? No  1.b. Have you ever attempted to kill yourself and, if yes, when did this last happen? No   2. Recent or current suicide plan? No   3. Recent or current intent to act on ideation? No  4. Lifetime psychiatric hospitalization? No  5. Pattern of excessive  substance use? No  6. Current irritability, agitation, or aggression? No  Scoring note: BOTH 1a and 1b must be yes for it to score 1 point, if both are not yes it is zero. All others are 1 point per number. If all questions 1a/1b - 6 are no, risk is negligible. If one of 1a/1b is yes, then risk is mild. If either question 2 or 3, but not both, is yes, then risk is automatically moderate regardless of total score. If both 2 and 3 are yes, risk is automatically high regardless of total score.      Score: 0, negligible risk      Does the patient have access to lethal means? No     Does the patient engage in non-suicidal self-injurious behavior (NSSI/SIB)? no     Does the patient have thoughts of harming others? No     Is the patient engaging in sexually inappropriate behavior?  no        Current Substance Abuse     Is there recent substance abuse? no     Was a urine drug screen or blood alcohol level obtained: No       Mental Status Exam     Affect: Appropriate   Appearance: Appropriate    Attention Span/Concentration: Attentive  Eye Contact: Avoidant   Fund of Knowledge: Appropriate    Language /Speech Content: Fluent   Language /Speech Volume: Soft    Language /Speech Rate/Productions: Normal    Recent Memory: Poor   Remote Memory: Poor   Mood: Normal    Orientation to Person: Yes    Orientation to Place: Yes   Orientation to Time of Day: Yes    Orientation to Date: Yes    Situation (Do they understand why they are here?): Yes    Psychomotor Behavior: Normal    Thought Content: Clear   Thought Form: Goal Directed      History of commitment: No       Medication    Psychotropic medications:   Current Facility-Administered Medications   Medication     bupivacaine (PF) (MARCAINE) 0.5 % injection     acetaminophen (TYLENOL) tablet 500 mg     Current Outpatient Medications   Medication     carbidopa-levodopa (SINEMET CR)  MG CR tablet     carbidopa-levodopa (SINEMET)  MG tablet     carbidopa-levodopa (SINEMET)   MG tablet     cholecalciferol 50 MCG (2000 UT) tablet     glycopyrrolate (ROBINUL) 1 MG tablet     glycopyrrolate (ROBINUL) 1 MG tablet     neomycin-polymixin-dexamethasone (MAXITROL) ophthalmic ointment     polyvinyl alcohol (LIQUIFILM TEARS) 1.4 % ophthalmic solution     QUEtiapine (SEROQUEL) 25 MG tablet     QUEtiapine (SEROQUEL) 25 MG tablet     Medication changes made in the last two weeks: No       Current Care Team    Primary Care Provider: Lizzette Vasquez at [facility] in [place], [place]   Psychiatrist: No  Therapist: No  : No     CTSS or ARMHS: No  ACT Team: No  Other: No      Diagnosis    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  Patient has a noted history of hallucinations as a result of her Parkinson's disease but this continues to be treated by her provider and does not appear to be affecting patient at this time.  Patient denies concerns regarding her mental health and does not wish to change her treatment plan at this time to include individual therapy.  Patient has providers at her nursing facility that will make changes to her treatment plan as necessary regarding her mental health.  Disposition    Recommended disposition: Other: Nursing home       Reviewed case and recommendations with attending provider. Attending Name: Dr. Bernard     Attending concurs with disposition: Yes       Patient concurs with disposition: Yes       Guardian concurs with disposition: Yes      Final disposition: Other: Nursing home.     Outpatient Details (if applicable):   Aftercare plan and appointments placed in the AVS and provided to patient: Yes. Given to patient by nurse    Was lethal means counseling provided as a part of aftercare planning? No;       Assessment  "Details    Patient interview started at: 9:30am and completed at: 9:50am.     Total duration spent on the patient case in minutes: .75 hrs      CPT code(s) utilized: 46719 - Psychotherapy for Crisis - 60 (30-74*) min       Bertha Matute, LICSW, MSW, LICSW, LMHP  DEC - Triage & Transition Services  Callback: [phone]      Aftercare Plan  If I am feeling unsafe or I am in a crisis, I will:   Contact my established care providers   Call the National Suicide Prevention Lifeline: 988  Go to the nearest emergency room   Call 911     Warning signs that I or other people might notice when a crisis is developing for me: Feeling overwhelmed or hopeless.    Things I am able to do on my own to cope or help me feel better: Reading or working on my [employer] project.    Things that I am able to do with others to cope or help me better: Engaging with activities in the nursing home or being with my family.    Things I can use or do for distraction: Reading    Changes I can make to support my mental health and wellness: Engage regularly with my providers.    People in my life that I can ask for help: Daughter and friends    Your [facility] has a mental health crisis team you can call 24/7: [facility]  [phone]      Crisis Lines  Crisis Text Line  Text 392097  You will be connected with a trained live crisis counselor to provide support.    Por espanol, texto  STEFAN a 850074 o texto a 442-AYUDAME en WhatsApp    [employer] (LGBTQ Youth Crisis Line)  9.025.368.3455  text START to 622-344      Community Resources  Fast Tracker  Linking people to mental health and substance use disorder resources  fasttrackermn.org     [place] Mental Health Warm Line  Peer to peer support  Monday thru Saturday, 12 pm to 10 pm  924.733.7146 or 3.147.664.3212  Text \"Support\" to 57834    National [place] on Mental Illness (TIANNA)  529.364.7010 or 1.888.TIANNA.HELPS      Mental Health Apps  My3  " https://myFirmexpp.org/    VirtualHopeBox  https://Idea2/apps/virtual-hope-box/      Additional Information  Today you were seen by a licensed mental health professional through Triage and Transition services, [facility] (P)  for a crisis assessment in the Emergency Department at [facility].  It is recommended that you follow up with your established providers (psychiatrist, mental health therapist, and/or primary care doctor - as relevant) as soon as possible. Coordinators from [facility] will be calling you in the next 24-48 hours to ensure that you have the resources you need.  You can also contact [facility] coordinators directly at [phone]. You may have been scheduled for or offered an appointment with a mental health provider. [facility] maintains an extensive network of licensed behavioral health providers to connect patients with the services they need.  We do not charge providers a fee to participate in our referral network.  We match patients with providers based on a patient's specific needs, insurance coverage, and location.  Our first effort will be to refer you to a provider within your care system, and will utilize providers outside your care system as needed.

## 2022-11-01 NOTE — DISCHARGE INSTRUCTIONS
Please monitor symptoms closely.  Avoid activities that would place her at increased risk for fall and head injury.    Return to ER if you develop severe headache, vomiting, numbness or weakness to your arms or legs or any other concerns    Follow-up with primary care provider in 2 to 3 days for recheck.    Plan for staple removal in 7 to 10 days.    Discharge Instructions  Head Injury    You have been seen today for a head injury. Your evaluation included a history and physical examination. You may have had a CT (CAT) scan performed, though most head injuries do not require a scan. Based on this evaluation, your provider today does not feel that your head injury is serious.    Generally, every Emergency Department visit should have a follow-up clinic visit with either a primary or a specialty clinic/provider. Please follow-up as instructed by your emergency provider today.  Return to the Emergency Department if:  You are confused or you are not acting right.  Your headache gets worse or you start to have a really bad headache even with your recommended treatment plan.  You vomit (throw up) more than once.  You have a seizure.  You have trouble walking.  You have weakness or paralysis (cannot move) in an arm or a leg.  You have blood or fluid coming from your ears or nose.  You have new symptoms or anything that worries you.    Sleeping:  It is okay for you to sleep, but someone should wake you up if instructed by your provider, and someone should check on you at your usual time to wake up.     Activity:  Do not drive for at least 24 hours.  Do not drive if you have dizzy spells or trouble concentrating, or remembering things.  Do not return to any contact sports until cleared by your regular provider.     MORE INFORMATION:    Concussion:  A concussion is a minor head injury that may cause temporary problems with the way the brain works. Although concussions are important, they are generally not an emergency or a  reason that a person needs to be hospitalized. Some concussion symptoms include confusion, amnesia (forgetful), nausea (sick to your stomach) and vomiting (throwing up), dizziness, fatigue, memory or concentration problems, irritability and sleep problems. For most people, concussions are mild and temporary but some will have more severe and persistent symptoms that require on-going care and treatment.  CT Scans: Your evaluation today may have included a CT scan (CAT scan) to look for things like bleeding or a skull fracture (broken bone).  CT scans involve radiation and too many CT scans can cause serious health problems like cancer, especially in children.  Because of this, your provider may not have ordered a CT scan today if they think you are at low risk for a serious or life threatening problem.    If you were given a prescription for medicine here today, be sure to read all of the information (including the package insert) that comes with your prescription.  This will include important information about the medicine, its side effects, and any warnings that you need to know about.  The pharmacist who fills the prescription can provide more information and answer questions you may have about the medicine.  If you have questions or concerns that the pharmacist cannot address, please call or return to the Emergency Department.     Remember that you can always come back to the Emergency Department if you are not able to see your regular provider in the amount of time listed above, if you get any new symptoms, or if there is anything that worries you.  Discharge Instructions  Laceration (Cut)    You were seen today for a laceration (cut).  Your provider examined your laceration for any problems such a buried foreign body (like glass, a splinter, or gravel), or injury to blood vessels, tendons, and nerves.  Your provider may have also rinsed and/or scrubbed your laceration to help prevent an infection. It may not be  possible to find all problems with your laceration on the first visit; occasionally foreign bodies or a tendon injury can go undetected.    Your laceration may have been closed in one of several ways:  No closure: many wounds will heal just fine without closure.  Stitches: regular stitches that require removal.  Staples: skin staples are often used in the scalp/head.  Wound adhesive (glue): skin glue can be used for certain lacerations and doesn t require removal.  Wound strips (aka Butterfly bandages or steri-strips): these are bandages that help to close a wound.  Absorbable stitches:  dissolving  stitches that go away on their own and usually don t require removal.    A small percentage of wounds will develop an infection regardless of how well the wound is cared for. Antibiotics are generally not indicated to prevent an infection so are only given for a small number of high-risk wounds. Some lacerations are too high risk to close, and are left open to heal because closure can increase the likelihood that an infection will develop.    Remember that all lacerations, no matter how expertly repaired, will cause scarring. We consider many factors, techniques, and materials, in our efforts to provide the best possible cosmetic outcome.    Generally, every Emergency Department visit should have a follow-up clinic visit with either a primary or a specialty clinic/provider. Please follow-up as instructed by your emergency provider today.     Return to the Emergency Department right away if:  You have more redness, swelling, pain, drainage (pus), a bad smell, or red streaking from your laceration as these symptoms could indicate an infection.  You have a fever of 100.4 F or more.  You have bleeding that you cannot stop at home. If your cut starts to bleed, hold pressure on the bleeding area with a clean cloth or put pressure over the bandage.  If the bleeding does not stop after using constant pressure for 30 minutes, you  should return to the Emergency Department for further treatment.  An area past the laceration is cool, pale, or blue compared with the other side, or has a slower return of color when squeezed.  Your dressing seems too tight or starts to get uncomfortable or painful. For children, signs of a problem might be irritability or restlessness.  You have loss of normal function or use of an area, such as being unable to straighten or bend a finger normally.  You have a numb area past the laceration.    Return to the Emergency Department or see your regular provider if:  The laceration starts to come open.   You have something coming out of the cut or a feeling that there is something in the laceration.  Your wound will not heal, or keeps breaking open. There can always be glass, wood, dirt or other things in any wound.  They will not always show up, even on x-rays.  If a wound does not heal, this may be why, and it is important to follow-up with your regular provider.    Home Care:  Take your dressing off in 12-24 hours, or as instructed by your provider, to check your laceration. Remove the dressing sooner if it seems too tight or painful, or if it is getting numb, tingly, or pale past the dressing.  Gently wash your laceration 1-2 times daily with clean water and mild soap. It is okay to shower or run clean water over the laceration, but do not let the laceration soak in water (no swimming).  If your laceration was closed with wound adhesive or strips: pat it dry and leave it open to the air. For all other repairs: after you wash your laceration, or at least 2 times a day, apply antibiotic ointment (such as Neosporin  or Bacitracin ) to the laceration, then cover it with a Band-Aid  or gauze.  Keep the laceration clean. Wear gloves or other protective clothing if you are around dirt.    Follow-up for removal:  If your wound was closed with staples or regular stitches, they need to be removed according to the instructions  and timeline specified by your provider today.  If your wound was closed with absorbable ( dissolving ) sutures, they should fall out, dissolve, or not be visible in about one week. If they are still visible, then they should be removed according to the instructions and timeline specified by your provider today.    Scars:  To help minimize scarring:  Wear sunscreen over the healed laceration when out in the sun.  Massage the area regularly once healed.  You may apply Vitamin E to the healed wound.  Wait. Scars improve in appearance over months and years.    If you were given a prescription for medicine here today, be sure to read all of the information (including the package insert) that comes with your prescription.  This will include important information about the medicine, its side effects, and any warnings that you need to know about.  The pharmacist who fills the prescription can provide more information and answer questions you may have about the medicine.  If you have questions or concerns that the pharmacist cannot address, please call or return to the Emergency Department.       Remember that you can always come back to the Emergency Department if you are not able to see your regular provider in the amount of time listed above, if you get any new symptoms, or if there is anything that worries you.    Aftercare Plan  If I am feeling unsafe or I am in a crisis, I will:   Contact my established care providers   Call the National Suicide Prevention Lifeline: 988  Go to the nearest emergency room   Call 911     Warning signs that I or other people might notice when a crisis is developing for me: Feeling overwhelmed or hopeless.    Things I am able to do on my own to cope or help me feel better: Reading or working on my genealogy project.    Things that I am able to do with others to cope or help me better: Engaging with activities in the nursing home or being with my family.    Things I can use or do for  "distraction: Reading    Changes I can make to support my mental health and wellness: Engage regularly with my providers.    People in my life that I can ask for help: Daughter and friends    Your county has a mental health crisis team you can call 24/7: [facility]  [phone]      Crisis Lines  Crisis Text Line  Text 072411  You will be connected with a trained live crisis counselor to provide support.    Por espanol, texto  STEFAN a 734419 o texto a 442-AYUDAME en WhatsApp    [employer] (LGBTQ Youth Crisis Line)  7.548.667.7153  text START to 294-953      [employer]  Fast Tracker  Linking people to mental health and substance use disorder resources  [employer].Textingly     [place] Mental Health Warm Line  Peer to peer support  Monday thru Saturday, 12 pm to 10 pm  869.380.6327 or 5.363.455.0883  Text \"Support\" to 57106    National [place] on Mental Illness (TIANNA)  348.262.8376 or 1.888.TIANNA.HELPS      Mental Health Apps  My3  https://Sammy[employer] great American barpp.org/    VirtualHopeBox  https://MyColorScreenorg/apps/virtual-hope-box/      Additional Information  Today you were seen by a licensed mental health professional through Triage and Transition services, [facility] ([facility])  for a crisis assessment in the Emergency Department at [facility].  It is recommended that you follow up with your established providers (psychiatrist, mental health therapist, and/or primary care doctor - as relevant) as soon as possible. Coordinators from [facility] will be calling you in the next 24-48 hours to ensure that you have the resources you need.  You can also contact [facility] coordinators directly at [phone]. You may have been scheduled for or offered an appointment with a mental health provider. [facility] maintains an extensive network of licensed behavioral health providers to connect patients with the services they need.  We do not charge providers a fee to participate in our referral " network.  We match patients with providers based on a patient's specific needs, insurance coverage, and location.  Our first effort will be to refer you to a provider within your care system, and will utilize providers outside your care system as needed.

## 2022-11-05 ENCOUNTER — HOSPITAL ENCOUNTER (INPATIENT)
Facility: CLINIC | Age: 77
LOS: 5 days | Discharge: NURSING, REHAB OR LONG-TERM CARE | DRG: 289 | End: 2022-11-12
Attending: EMERGENCY MEDICINE | Admitting: HOSPITALIST
Payer: MEDICARE

## 2022-11-05 ENCOUNTER — APPOINTMENT (OUTPATIENT)
Dept: CT IMAGING | Facility: CLINIC | Age: 77
DRG: 289 | End: 2022-11-05
Attending: EMERGENCY MEDICINE
Payer: MEDICARE

## 2022-11-05 ENCOUNTER — APPOINTMENT (OUTPATIENT)
Dept: CT IMAGING | Facility: CLINIC | Age: 77
End: 2022-11-05
Attending: EMERGENCY MEDICINE
Payer: MEDICARE

## 2022-11-05 ENCOUNTER — HOSPITAL ENCOUNTER (EMERGENCY)
Facility: CLINIC | Age: 77
Discharge: HOME | End: 2022-11-05
Attending: EMERGENCY MEDICINE | Admitting: EMERGENCY MEDICINE
Payer: MEDICARE

## 2022-11-05 ENCOUNTER — APPOINTMENT (OUTPATIENT)
Dept: GENERAL RADIOLOGY | Facility: CLINIC | Age: 77
End: 2022-11-05
Attending: EMERGENCY MEDICINE
Payer: MEDICARE

## 2022-11-05 ENCOUNTER — MEDICAL CORRESPONDENCE (OUTPATIENT)
Dept: EMERGENCY MEDICINE | Facility: CLINIC | Age: 77
End: 2022-11-05

## 2022-11-05 VITALS
SYSTOLIC BLOOD PRESSURE: 171 MMHG | RESPIRATION RATE: 20 BRPM | OXYGEN SATURATION: 95 % | TEMPERATURE: 99.1 F | HEART RATE: 82 BPM | DIASTOLIC BLOOD PRESSURE: 90 MMHG

## 2022-11-05 DIAGNOSIS — G20.A1 PARKINSON'S DISEASE (H): ICD-10-CM

## 2022-11-05 DIAGNOSIS — S01.01XA LACERATION OF SCALP, INITIAL ENCOUNTER: ICD-10-CM
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
## 2022-11-05 DIAGNOSIS — I33.0 BACTERIAL ENDOCARDITIS, UNSPECIFIED CHRONICITY: ICD-10-CM

## 2022-11-05 DIAGNOSIS — I33.0 SUBACUTE BACTERIAL ENDOCARDITIS: Primary | ICD-10-CM

## 2022-11-05 DIAGNOSIS — R29.6 FALLS FREQUENTLY: ICD-10-CM

## 2022-11-05 DIAGNOSIS — S09.90XA CLOSED HEAD INJURY, INITIAL ENCOUNTER: ICD-10-CM

## 2022-11-05 DIAGNOSIS — R53.1 GENERALIZED WEAKNESS: ICD-10-CM

## 2022-11-05 DIAGNOSIS — K21.9 GASTROESOPHAGEAL REFLUX DISEASE WITHOUT ESOPHAGITIS: ICD-10-CM

## 2022-11-05 LAB
ALBUMIN UR-MCNC: 20 MG/DL
ANION GAP SERPL CALCULATED.3IONS-SCNC: 12 MMOL/L (ref 7–15)
ANION GAP SERPL CALCULATED.3IONS-SCNC: 9 MMOL/L (ref 7–15)
APPEARANCE UR: CLEAR
APTT PPP: 34 SECONDS (ref 22–38)
BASOPHILS # BLD AUTO: 0 10E3/UL (ref 0–0.2)
BASOPHILS # BLD AUTO: 0 10E3/UL (ref 0–0.2)
BASOPHILS NFR BLD AUTO: 0 %
BASOPHILS NFR BLD AUTO: 0 %
BILIRUB UR QL STRIP: NEGATIVE
BUN SERPL-MCNC: 12.8 MG/DL (ref 8–23)
BUN SERPL-MCNC: 15.3 MG/DL (ref 8–23)
CALCIUM SERPL-MCNC: 9.4 MG/DL (ref 8.8–10.2)
CALCIUM SERPL-MCNC: 9.8 MG/DL (ref 8.8–10.2)
CHLORIDE SERPL-SCNC: 102 MMOL/L (ref 98–107)
CHLORIDE SERPL-SCNC: 99 MMOL/L (ref 98–107)
CK SERPL-CCNC: 727 U/L (ref 26–192)
COLOR UR AUTO: ABNORMAL
CREAT SERPL-MCNC: 0.47 MG/DL (ref 0.51–0.95)
CREAT SERPL-MCNC: 0.51 MG/DL (ref 0.51–0.95)
DEPRECATED HCO3 PLAS-SCNC: 27 MMOL/L (ref 22–29)
DEPRECATED HCO3 PLAS-SCNC: 27 MMOL/L (ref 22–29)
EOSINOPHIL # BLD AUTO: 0 10E3/UL (ref 0–0.7)
EOSINOPHIL # BLD AUTO: 0 10E3/UL (ref 0–0.7)
EOSINOPHIL NFR BLD AUTO: 0 %
EOSINOPHIL NFR BLD AUTO: 0 %
ERYTHROCYTE [DISTWIDTH] IN BLOOD BY AUTOMATED COUNT: 12.3 % (ref 10–15)
ERYTHROCYTE [DISTWIDTH] IN BLOOD BY AUTOMATED COUNT: 12.7 % (ref 10–15)
FLUAV RNA SPEC QL NAA+PROBE: NEGATIVE
FLUBV RNA RESP QL NAA+PROBE: NEGATIVE
GFR SERPL CREATININE-BSD FRML MDRD: >90 ML/MIN/1.73M2
GFR SERPL CREATININE-BSD FRML MDRD: >90 ML/MIN/1.73M2
GLUCOSE SERPL-MCNC: 118 MG/DL (ref 70–99)
GLUCOSE SERPL-MCNC: 119 MG/DL (ref 70–99)
GLUCOSE UR STRIP-MCNC: NEGATIVE MG/DL
HCT VFR BLD AUTO: 36.8 % (ref 35–47)
HCT VFR BLD AUTO: 40.7 % (ref 35–47)
HGB BLD-MCNC: 11.7 G/DL (ref 11.7–15.7)
HGB BLD-MCNC: 13.1 G/DL (ref 11.7–15.7)
HGB UR QL STRIP: ABNORMAL
HOLD SPECIMEN: NORMAL
IMM GRANULOCYTES # BLD: 0 10E3/UL
IMM GRANULOCYTES # BLD: 0.1 10E3/UL
IMM GRANULOCYTES NFR BLD: 0 %
IMM GRANULOCYTES NFR BLD: 1 %
INR PPP: 1.21 (ref 0.85–1.15)
KETONES UR STRIP-MCNC: 10 MG/DL
LACTATE SERPL-SCNC: 0.8 MMOL/L (ref 0.7–2)
LEUKOCYTE ESTERASE UR QL STRIP: NEGATIVE
LYMPHOCYTES # BLD AUTO: 0.8 10E3/UL (ref 0.8–5.3)
LYMPHOCYTES # BLD AUTO: 0.9 10E3/UL (ref 0.8–5.3)
LYMPHOCYTES NFR BLD AUTO: 7 %
LYMPHOCYTES NFR BLD AUTO: 7 %
MCH RBC QN AUTO: 34 PG (ref 26.5–33)
MCH RBC QN AUTO: 34 PG (ref 26.5–33)
MCHC RBC AUTO-ENTMCNC: 31.8 G/DL (ref 31.5–36.5)
MCHC RBC AUTO-ENTMCNC: 32.2 G/DL (ref 31.5–36.5)
MCV RBC AUTO: 106 FL (ref 78–100)
MCV RBC AUTO: 107 FL (ref 78–100)
MONOCYTES # BLD AUTO: 1.1 10E3/UL (ref 0–1.3)
MONOCYTES # BLD AUTO: 1.3 10E3/UL (ref 0–1.3)
MONOCYTES NFR BLD AUTO: 10 %
MONOCYTES NFR BLD AUTO: 9 %
NEUTROPHILS # BLD AUTO: 11.6 10E3/UL (ref 1.6–8.3)
NEUTROPHILS # BLD AUTO: 9 10E3/UL (ref 1.6–8.3)
NEUTROPHILS NFR BLD AUTO: 83 %
NEUTROPHILS NFR BLD AUTO: 83 %
NITRATE UR QL: NEGATIVE
NRBC # BLD AUTO: 0 10E3/UL
NRBC # BLD AUTO: 0 10E3/UL
NRBC BLD AUTO-RTO: 0 /100
NRBC BLD AUTO-RTO: 0 /100
PH UR STRIP: 6.5 [PH] (ref 5–7)
PLATELET # BLD AUTO: 274 10E3/UL (ref 150–450)
PLATELET # BLD AUTO: 300 10E3/UL (ref 150–450)
POTASSIUM SERPL-SCNC: 3.6 MMOL/L (ref 3.4–5.3)
POTASSIUM SERPL-SCNC: 4 MMOL/L (ref 3.4–5.3)
RBC # BLD AUTO: 3.44 10E6/UL (ref 3.8–5.2)
RBC # BLD AUTO: 3.85 10E6/UL (ref 3.8–5.2)
RBC URINE: 5 /HPF
RSV RNA SPEC NAA+PROBE: NEGATIVE
SARS-COV-2 RNA RESP QL NAA+PROBE: NEGATIVE
SODIUM SERPL-SCNC: 138 MMOL/L (ref 136–145)
SODIUM SERPL-SCNC: 138 MMOL/L (ref 136–145)
SP GR UR STRIP: 1.01 (ref 1–1.03)
TROPONIN T SERPL HS-MCNC: 29 NG/L
UROBILINOGEN UR STRIP-MCNC: NORMAL MG/DL
WBC # BLD AUTO: 10.9 10E3/UL (ref 4–11)
WBC # BLD AUTO: 13.9 10E3/UL (ref 4–11)
WBC URINE: 3 /HPF

## 2022-11-05 PROCEDURE — 258N000003 HC RX IP 258 OP 636: Performed by: EMERGENCY MEDICINE

## 2022-11-05 PROCEDURE — 36415 COLL VENOUS BLD VENIPUNCTURE: CPT | Performed by: EMERGENCY MEDICINE

## 2022-11-05 PROCEDURE — 82550 ASSAY OF CK (CPK): CPT | Performed by: EMERGENCY MEDICINE

## 2022-11-05 PROCEDURE — G1010 CDSM STANSON: HCPCS

## 2022-11-05 PROCEDURE — 80048 BASIC METABOLIC PNL TOTAL CA: CPT | Performed by: EMERGENCY MEDICINE

## 2022-11-05 PROCEDURE — 99285 EMERGENCY DEPT VISIT HI MDM: CPT | Mod: 25

## 2022-11-05 PROCEDURE — 93005 ELECTROCARDIOGRAM TRACING: CPT

## 2022-11-05 PROCEDURE — 85025 COMPLETE CBC W/AUTO DIFF WBC: CPT | Performed by: EMERGENCY MEDICINE

## 2022-11-05 PROCEDURE — 84484 ASSAY OF TROPONIN QUANT: CPT | Performed by: EMERGENCY MEDICINE

## 2022-11-05 PROCEDURE — 250N000013 HC RX MED GY IP 250 OP 250 PS 637: Performed by: EMERGENCY MEDICINE

## 2022-11-05 PROCEDURE — 87088 URINE BACTERIA CULTURE: CPT | Performed by: HOSPITALIST

## 2022-11-05 PROCEDURE — 85730 THROMBOPLASTIN TIME PARTIAL: CPT | Performed by: EMERGENCY MEDICINE

## 2022-11-05 PROCEDURE — 81001 URINALYSIS AUTO W/SCOPE: CPT | Performed by: EMERGENCY MEDICINE

## 2022-11-05 PROCEDURE — 73030 X-RAY EXAM OF SHOULDER: CPT | Mod: LT

## 2022-11-05 PROCEDURE — 0HQ0XZZ REPAIR SCALP SKIN, EXTERNAL APPROACH: ICD-10-PCS | Performed by: EMERGENCY MEDICINE

## 2022-11-05 PROCEDURE — 85610 PROTHROMBIN TIME: CPT | Performed by: EMERGENCY MEDICINE

## 2022-11-05 PROCEDURE — 72125 CT NECK SPINE W/O DYE: CPT | Mod: MA,77

## 2022-11-05 PROCEDURE — 87637 SARSCOV2&INF A&B&RSV AMP PRB: CPT | Performed by: EMERGENCY MEDICINE

## 2022-11-05 PROCEDURE — 12001 RPR S/N/AX/GEN/TRNK 2.5CM/<: CPT

## 2022-11-05 PROCEDURE — 96360 HYDRATION IV INFUSION INIT: CPT

## 2022-11-05 PROCEDURE — 87077 CULTURE AEROBIC IDENTIFY: CPT | Performed by: EMERGENCY MEDICINE

## 2022-11-05 PROCEDURE — 87149 DNA/RNA DIRECT PROBE: CPT | Performed by: EMERGENCY MEDICINE

## 2022-11-05 PROCEDURE — 96361 HYDRATE IV INFUSION ADD-ON: CPT

## 2022-11-05 PROCEDURE — 70450 CT HEAD/BRAIN W/O DYE: CPT | Mod: MA,77

## 2022-11-05 PROCEDURE — 250N000009 HC RX 250

## 2022-11-05 PROCEDURE — C9803 HOPD COVID-19 SPEC COLLECT: HCPCS

## 2022-11-05 PROCEDURE — 83605 ASSAY OF LACTIC ACID: CPT | Performed by: EMERGENCY MEDICINE

## 2022-11-05 RX ORDER — LIDOCAINE HYDROCHLORIDE AND EPINEPHRINE 10; 10 MG/ML; UG/ML
INJECTION, SOLUTION INFILTRATION; PERINEURAL
Status: DISCONTINUED
Start: 2022-11-05 | End: 2022-11-05 | Stop reason: HOSPADM

## 2022-11-05 RX ORDER — SODIUM CHLORIDE 9 MG/ML
INJECTION, SOLUTION INTRAVENOUS CONTINUOUS
Status: DISCONTINUED | OUTPATIENT
Start: 2022-11-05 | End: 2022-11-06

## 2022-11-05 RX ORDER — SODIUM CHLORIDE 9 MG/ML
INJECTION, SOLUTION INTRAVENOUS CONTINUOUS
Status: DISCONTINUED | OUTPATIENT
Start: 2022-11-05 | End: 2022-11-05 | Stop reason: HOSPADM

## 2022-11-05 RX ORDER — ACETAMINOPHEN 500 MG
500 TABLET ORAL EVERY 4 HOURS PRN
Status: DISCONTINUED | OUTPATIENT
Start: 2022-11-05 | End: 2022-11-05 | Stop reason: HOSPADM

## 2022-11-05 RX ADMIN — ACETAMINOPHEN 500 MG: 500 TABLET ORAL at 12:40

## 2022-11-05 RX ADMIN — SODIUM CHLORIDE 1000 ML: 9 INJECTION, SOLUTION INTRAVENOUS at 22:00

## 2022-11-05 RX ADMIN — SODIUM CHLORIDE: 9 INJECTION, SOLUTION INTRAVENOUS at 12:41

## 2022-11-05 RX ADMIN — SODIUM CHLORIDE 1000 ML: 9 INJECTION, SOLUTION INTRAVENOUS at 16:30

## 2022-11-05 RX ADMIN — Medication 3 ML: at 22:00

## 2022-11-05 ASSESSMENT — ENCOUNTER SYMPTOMS
WOUND: 1
DIZZINESS: 0
ARTHRALGIAS: 1
NECK PAIN: 1
LIGHT-HEADEDNESS: 0

## 2022-11-05 ASSESSMENT — ACTIVITIES OF DAILY LIVING (ADL)
ADLS_ACUITY_SCORE: 35

## 2022-11-05 NOTE — ED PROVIDER NOTES
History   Chief Complaint:  Fall     The history is provided by the patient.      Pablo Baez is a 77 year old female with history of parkinson's and multiple falls who presents with a fall earlier today around 1100. The patient reports that she stood up from her bed and used her walker to go to the kitchen and then fell straight back landing on her head. She says that she wasn't sure why she fell and denied dizziness or syncope. She says that she has an alarm that she pushed and got help from staff right away. The patient also complains of pain in her shoulder and upper arms, but notes that she had a COVID booster and flu shot two days ago. Patient was a smoker many years ago and drinks alcohol about once a month. Patient lives in an assisted living facility.     Review of Systems   Musculoskeletal: Positive for arthralgias.   Skin: Positive for wound.   Neurological: Negative for dizziness, syncope and light-headedness.   All other systems reviewed and are negative.     Allergies:  Lisinopril    Medications:  Carbidopa-levodopa  Glycopyrrolate  Quetiapine  Aspirin  Famotidine  Glucosamine   Methocarbamol  Omeprazole   nuplazid   Snokot    Past Medical History:     Confusion  multiple falls  Vertigo  Parkinson's  Hypertension  Basal cell carcinoma  Psychotic disorder with delusions  Kidney stones   Osteroarthritis  Calculus of kidney  C. Diff  DJD  GERD  Impaired cognition  Insomnia  OA  Oropharyngeal dysphagia  Osteoporoses    Past Surgical History:    Hernia repair  [place] tooth repair  Rotator cuff repair  Tubal ligation   C3-5 fusion   Osteopenia     Family History:    Father: cancer, hypertension  Mother: hypertension, stroke, osteoporosis     Social History:  The patient presents to the ED alone.   Lives in assisted living.  Past smoker, rare EtOH drinker.     Physical Exam     Patient Vitals for the past 24 hrs:   BP Temp Temp src Pulse Resp SpO2   11/05/22 1330 (!) 170/70 -- -- 82 -- 97 %   11/05/22  1315 (!) 172/77 -- -- 83 -- 97 %   11/05/22 1300 (!) 166/79 -- -- 80 -- 97 %   11/05/22 1245 (!) 170/88 -- -- 83 -- 97 %   11/05/22 1215 (!) 157/71 -- -- 76 -- 97 %   11/05/22 1200 (!) 173/79 -- -- 85 -- 96 %   11/05/22 1145 (!) 162/74 -- -- 78 -- 96 %   11/05/22 1138 (!) 171/84 99.1  F (37.3  C) Temporal 81 20 99 %     Physical Exam    General: The patient is alert, in no respiratory distress.    HENT: Mucous membranes moist. Right eye has periorbital swelling and ecchymosis. Left occiput has laceration. In C collar.     Cardiovascular: Regular rate and rhythm. Good pulses in all four extremities. Normal capillary refill and skin turgor.     Respiratory: Lungs are clear. No nasal flaring. No retractions. No wheezing, no crackles.    Gastrointestinal: Abdomen soft. No guarding, no rebound. No palpable hernias.     Musculoskeletal: No gross deformity. Left shoulder and humerus tenderness.     Skin: No rashes or petechiae.     Neurologic: The patient is alert and oriented x3. GCS 15. No testable cranial nerve deficit. Follows commands with clear and appropriate speech. Gives appropriate answers. Good strength in all extremities. No gross neurologic deficit. Gross sensation intact. Pupils are round and reactive. No meningismus.     Lymphatic: No cervical adenopathy. No lower extremity swelling.    Psychiatric: The patient is non-tearful.    Emergency Department Course   ECG:  ECG taken at 1247, ECG read at 1251  Normal sinus rhythm  Normal ECG   Rate 81 bpm. WV interval 138 ms. QRS duration 84 ms. QT/QTc 386/448 ms. P-R-T axes 71 27 37.      Imaging:  XR Shoulder Left G/E 3 Views   Final Result   IMPRESSION: There is irregularity in the posterior aspect of the left mid ribs, some of which could represent chronic fracture deformities, however superimposed acute component is also possible in the appropriate clinical setting. No acute fracture or    dislocation of the shoulder. Moderate to severe acromioclavicular and  moderate glenohumeral joint degenerative changes. Partially visualized with surgical changes in the neck. Hazy interstitial opacities in the lungs incompletely evaluated.      NOTE: ABNORMAL REPORT      THE DICTATION ABOVE DESCRIBES AN ABNORMALITY FOR WHICH FOLLOW-UP IS NEEDED.       CT Cervical Spine w/o Contrast   Final Result   IMPRESSION: ACDF from C3 down to C6 with corpectomy of C4 again noted. Degenerative change of the cervical spine again noted without change from the recent comparison study. No fracture. Alignment remains normal. No spinal canal stenosis. No prevertebral    soft tissue swelling.         CT Head w/o Contrast   Final Result   IMPRESSION:   1.  Right parietal scalp hematoma/laceration again noted.   2.  Otherwise, normal head CT.           Report per radiology    Laboratory:  Labs Ordered and Resulted from Time of ED Arrival to Time of ED Departure   BASIC METABOLIC PANEL - Abnormal       Result Value    Sodium 138      Potassium 4.0      Chloride 99      Carbon Dioxide (CO2) 27      Anion Gap 12      Urea Nitrogen 15.3      Creatinine 0.51      Calcium 9.8      Glucose 118 (*)     GFR Estimate >90     CK TOTAL - Abnormal     (*)    INR - Abnormal    INR 1.21 (*)    TROPONIN T, HIGH SENSITIVITY - Abnormal    Troponin T, High Sensitivity 29 (*)    CBC WITH PLATELETS AND DIFFERENTIAL - Abnormal    WBC Count 13.9 (*)     RBC Count 3.85      Hemoglobin 13.1      Hematocrit 40.7       (*)     MCH 34.0 (*)     MCHC 32.2      RDW 12.7      Platelet Count 300      % Neutrophils 83      % Lymphocytes 7      % Monocytes 9      % Eosinophils 0      % Basophils 0      % Immature Granulocytes 1      NRBCs per 100 WBC 0      Absolute Neutrophils 11.6 (*)     Absolute Lymphocytes 0.9      Absolute Monocytes 1.3      Absolute Eosinophils 0.0      Absolute Basophils 0.0      Absolute Immature Granulocytes 0.1      Absolute NRBCs 0.0     PARTIAL THROMBOPLASTIN TIME - Normal    aPTT 34           Emergency Department Course:      Procedure laceration repair  Location left parietal scalp 2 cm in length  Wound was cleaned explored anesthetized with lidocaine with epi and closed with 2 staples need to be removed in 7 days  Reviewed:  I reviewed nursing notes, vitals, past medical history and Care Everywhere    Assessments:  1155 I obtained history and examined the patient as noted above.   1430 I rechecked the patient and explained findings.     Interventions:  Medications   sodium chloride 0.9% infusion ( Intravenous New Bag 11/5/22 1241)   acetaminophen (TYLENOL) tablet 500 mg (500 mg Oral Given 11/5/22 1240)   0.9% sodium chloride BOLUS (has no administration in time range)   lidocaine 1% with EPINEPHrine 1:100,000 1 %-[id number] injection (has no administration in time range)      Disposition:  The patient was discharged to home.     Impression & Plan     Medical Decision Making:  The patient had a purely mechanical fall falling backwards.  The patient says she has fallen previously.  She says she did not blackout she said she pushed her alert bracelet immediately.  The CK is mildly elevated which I think is due to the area of the trauma as well as 2 falls.  She was placed in a c-collar or had images performed.  There was a question of rib fractures but no signs of shoulder fracture I think the rib fractures are more chronic from her previous fall she is neurologically intact.  There was a question of rib fractures on the left however she is not tender think those are old I did staple her forehead and she was discharged back to her facility.    Diagnosis:    ICD-10-CM    1. Fall, initial encounter  W19.XXXA       2. Closed head injury, initial encounter  S09.90XA       3. Laceration of scalp, initial encounter  S01.01XA           Discharge Medications:  New Prescriptions    No medications on file       Scribe Disclosure:  I, Ketan Hinojosa, am serving as a scribe at 11:55 AM on 11/5/2022 to document  services personally performed by Luis M Frank MD based on my observations and the provider's statements to me.            Luis M Frank MD  11/05/22 0809

## 2022-11-05 NOTE — ED NOTES
Called nursing facility x 2 both main line and nurse line with no answer. Messages left to return call to [facility] for nurse to nurse report. Daughter updated on plan to discharge.

## 2022-11-05 NOTE — ED NOTES
RN spoke with Funmi at [facility]. All questions answered. Pt to be transferred back to facility by wheelchair van.

## 2022-11-05 NOTE — ED NOTES
Bed: ED10  Expected date:   Expected time:   Means of arrival: Ambulance  Comments:  Deyvi 77 f fall

## 2022-11-05 NOTE — ED NOTES
RN called nursing facility x2 with message left to return call to [facility], total of x5 calls without response. Charge RN notified. Call x4 ([phone], 1521, [phone], [phone], [phone])

## 2022-11-05 NOTE — ED NOTES
RN contacted [facility] to conduct welfare check on [facility].

## 2022-11-05 NOTE — DISCHARGE INSTRUCTIONS
Discharge Instructions  Trauma    You were seen today for an injury due to some kind of trauma (crash, fall, etc.).  Some injuries may not show up until after you leave the Emergency Department.  It is important that you pay attention to these instructions and follow-up with your regular doctor as instructed.    Return to the Emergency Department right away if:  You have abdominal pain or bruises, chest pain, pain in a new area, or pain that is getting worse.  You get short of breath.  You develop a fever over 101 degrees.  You have weakness in your arms or legs.  You faint or you are very lightheaded.  You have any new symptoms, you are feeling weak or unusually ill, or something worries you.   Injuries to the brain are possible with any accident.  Return right away if you have confusion, vomiting more than once, difficulty walking or a headache that is getting worse. Bring a child or a person who can t talk back if they seem to be behaving in an abnormal way.      MORE INFORMATION:    General Injuries:  Aches and pains are usually worse the day after your accident, but should not be severe, and should start getting better after that. Aches and pains are common in the neck and back.  Injuries from your accident may prevent you from working.  Follow-up with your regular doctor to get a work note and to find out how long you will not be able to work.  Pain medications or your injuries may make it unsafe for you to drive or operate machinery.  Use ice to injured areas for the first one or two days. Apply a bag of ice wrapped in a cloth for about 15 minutes at a time. You can do this as often as once an hour. Do not sleep with an ice pack, since it can burn you.   You can use non-prescription pain medicine, like Tylenol  (acetaminophen), Advil  (ibuprofen), Motrin  (ibuprofen), Nuprin  (ibuprofen) if your emergency doctor or your own doctor told you this is okay. Tylenol  (acetaminophen) is in many prescription  medicines and non-prescription medicines--check all of your medicines to be sure you aren t taking more than 3000 mg per day.  Limit your activity for at least one or two days. Avoid doing things that hurt.  You need to see your doctor if any injured area is not back to normal in 1 week.    Car Accident:  If you have been on a backboard or had a neck collar on, this may make you stiff and sore.  This should get better in 1-2 days.  Return to the Emergency Department if the pain or discomfort is severe or gets worse.  Be careful of shards of glass on your body or in your belongings.    Fractures, Sprains, and Strains:  Return to the Emergency Department right away if your injured area gets more painful, if the splint or dressing seems to be too tight, if it gets numb or tingly past the injury, or if the area past the injury gets pale, blue, or cold.  Use your crutches if you were given them today. Don t put weight on the injured area until the pain is gone.  Keep the injured area above the level of your heart while laying or sitting down.  This well help lessen the swelling (puffiness) and the pain.  You may use an elastic bandage (Ace  Wrap) if it makes you more comfortable. Wrap it just tight enough to provide mild compression, and loosen it if you get swelling past the bandage.    Note about X-rays: If you had x-rays done today, they were read by your emergency physician. They will also be read later by a radiologist. We will contact you if the radiologist thinks they show something different than the emergency physician did. Remember that there are some fractures (breaks in the bone) that can t be seen right away. Even if your x-rays today were normal, you must see your doctor in clinic to re-check.     Splints:  A splint put on in the Emergency Department is temporary. Your regular doctor or orthopedic doctor will remove it, and replace it with a cast or boot if needed.  Keep the splint dry. Cover it with a  plastic bag when you wash. Even with a plastic bag, you still can t get in water or let water get right on it. If it does get wet, you should come back or see your doctor to have it replaced.  Do not put objects inside the splint to scratch.  If there is an elastic bandage (Ace  Wrap) holding the splint on this may be loosened a little to relieve pressure or pain.  If pain continues return to the Emergency Department right away.  Return if the splint starts cutting into your skin.  Do not remove your splint by yourself unless told to by your doctor. You can t take it off and put it back on again.     Wounds:  Infections can follow many injuries. Watch for fevers, redness spreading from the wound, pus or stitches that open up. Return here or see your doctor if these happen.  There can always be glass, wood, dirt or other things in any wound.  They won t always show up even on x-rays.  If a wound doesn t heal, this may be why, and it is important to follow-up with your regular doctor. Small pieces of glass or other materials may work their way out on their own.  Cuts or scrapes may start to bleed after leaving the Emergency Department.  If this happens, hold pressure on the bleeding area with a clean cloth or put pressure over the bandage.  If the bleeding doesn t stop after you use constant pressure for   hour, you should return to the Emergency Department for further treatment.  Any bandage or dressing put on here should be removed in 12-24 hours, or as your doctor instructs. Remove the dressing sooner if it seems too tight or painful, or if it is getting numb, tingly, or pale past the dressing.  After you take off the dressing, wash the cut or scrape with soap and water once or twice a day.  Apply ointment like Bacitracin  (polypeptide antibiotic) to scrapes or cuts, and keep them covered with a Band-Aid  or gauze if possible, until they heal up or until your stitches are taken out.  Dermabond  or Steri-Strips   should be left alone and will come off by themselves.  Dissolving stitches should go away or fall out within about a week.  Regular stitches need to be taken out by your doctor in clinic.  Call today and schedule an appointment.  Leave your stitches in for as long as you were told today.    Most injuries are preventable!  As your local emergency physicians, we encourage you to:  Wear your seat belt.  Do not talk on your cell phone while driving.  Do not read or send text messages while driving.  Wear a bike or motorcycle helmet.  Wear a helmet while skiing and snowboarding.  Wear personal flotation devices at all times while on the water.  Always have your child in a car seat.  Do not allow children less than 12 years old to ride in the front seat.  Go to the CDC website to find more information on preventing injures:  http://www.cdc.gov/injury/index.html    If you were given a prescription for medicine here today, be sure to read all of the information (including the package insert) that comes with your prescription.  This will include important information about the medicine, its side effects, and any warnings that you need to know about.  The pharmacist who fills the prescription can provide more information and answer questions you may have about the medicine.  If you have questions or concerns that the pharmacist cannot address, please call or return to the Emergency Department.     Opioid Medication Information    Pain medications are among the most commonly prescribed medicines, so we are including this information for all our patients. If you did not receive pain medication or get a prescription for pain medicine, you can ignore it.     You may have been given a prescription for an opioid (narcotic) pain medicine and/or have received a pain medicine while here in the Emergency Department. These medicines can make you drowsy or impaired. You must not drive, operate dangerous equipment, or engage in any other  dangerous activities while taking these medications. If you drive while taking these medications, you could be arrested for DUI, or driving under the influence. Do not drink any alcohol while you are taking these medications.     Opioid pain medications can cause addiction. If you have a history of chemical dependency of any type, you are at a higher risk of becoming addicted to pain medications.  Only take these prescribed medications to treat your pain when all other options have been tried. Take it for as short a time and as few doses as possible. Store your pain pills in a secure place, as they are frequently stolen and provide a dangerous opportunity for children or visitors in your house to start abusing these powerful medications. We will not replace any lost or stolen medicine.  As soon as your pain is better, you should flush all your remaining medication.     Many prescription pain medications contain Tylenol  (acetaminophen), including Vicodin , Tylenol #3 , Norco , Lortab , and Percocet .  You should not take any extra pills of Tylenol  if you are using these prescription medications or you can get very sick.  Do not ever take more than 3000 mg of acetaminophen in any 24 hour period.    All opioids tend to cause constipation. Drink plenty of water and eat foods that have a lot of fiber, such as fruits, vegetables, prune juice, apple juice and high fiber cereal.  Take a laxative if you don t move your bowels at least every other day. Miralax , Milk of Magnesia, Colace , or Senna  can be used to keep you regular.      Staples need to be removed in 7 days  Remember that you can always come back to the Emergency Department if you are not able to see your regular doctor in the amount of time listed above, if you get any new symptoms, or if there is anything that worries you.

## 2022-11-05 NOTE — ED TRIAGE NOTES
"Pt was walking into the kitchen with her walker and believes she lost balance and fell backwards. Pt is poor historian, unsure why she fell. Unwitnessed fall, staff at living facility state it could have been 1-2 hours prior to them arriving at apartment but pt does state she pressed her help button \"right away.\" Pt has laceration to back of head, bleeding controlled on arrival. No blood thinners. Denies LOC. C-collar applied by EMS. Pt c/o upper back pain and bilateral shoulder pain but does state she got both the COVID booster and flu shot on Thursday. No neck pain. Pt did have a fall last week, stitches in place over right eye and staples to back of head. ABCs intact. A&Ox4.      "

## 2022-11-06 ENCOUNTER — APPOINTMENT (OUTPATIENT)
Dept: GENERAL RADIOLOGY | Facility: CLINIC | Age: 77
DRG: 289 | End: 2022-11-06
Attending: PHYSICIAN ASSISTANT
Payer: MEDICARE

## 2022-11-06 LAB
CK SERPL-CCNC: 192 U/L (ref 26–192)
ENTEROCOCCUS FAECALIS: NOT DETECTED
ENTEROCOCCUS FAECIUM: NOT DETECTED
LISTERIA SPECIES (DETECTED/NOT DETECTED): NOT DETECTED
PROCALCITONIN SERPL IA-MCNC: 0.27 NG/ML
STAPHYLOCOCCUS AUREUS: NOT DETECTED
STAPHYLOCOCCUS EPIDERMIDIS: NOT DETECTED
STAPHYLOCOCCUS LUGDUNENSIS: NOT DETECTED
STAPHYLOCOCCUS SPECIES: NOT DETECTED
STREPTOCOCCUS AGALACTIAE: NOT DETECTED
STREPTOCOCCUS ANGINOSUS GROUP: NOT DETECTED
STREPTOCOCCUS PNEUMONIAE: NOT DETECTED
STREPTOCOCCUS PYOGENES: NOT DETECTED
STREPTOCOCCUS SPECIES: DETECTED
TROPONIN T SERPL HS-MCNC: 29 NG/L

## 2022-11-06 PROCEDURE — G0378 HOSPITAL OBSERVATION PER HR: HCPCS

## 2022-11-06 PROCEDURE — 99207 PR APP CREDIT; MD BILLING SHARED VISIT: CPT | Performed by: PHYSICIAN ASSISTANT

## 2022-11-06 PROCEDURE — 250N000013 HC RX MED GY IP 250 OP 250 PS 637: Performed by: PHYSICIAN ASSISTANT

## 2022-11-06 PROCEDURE — 250N000013 HC RX MED GY IP 250 OP 250 PS 637: Performed by: HOSPITALIST

## 2022-11-06 PROCEDURE — 250N000011 HC RX IP 250 OP 636: Performed by: EMERGENCY MEDICINE

## 2022-11-06 PROCEDURE — 96361 HYDRATE IV INFUSION ADD-ON: CPT

## 2022-11-06 PROCEDURE — 250N000011 HC RX IP 250 OP 636: Performed by: HOSPITALIST

## 2022-11-06 PROCEDURE — 82550 ASSAY OF CK (CPK): CPT | Performed by: PHYSICIAN ASSISTANT

## 2022-11-06 PROCEDURE — 36415 COLL VENOUS BLD VENIPUNCTURE: CPT | Performed by: HOSPITALIST

## 2022-11-06 PROCEDURE — 36415 COLL VENOUS BLD VENIPUNCTURE: CPT | Performed by: PHYSICIAN ASSISTANT

## 2022-11-06 PROCEDURE — 84145 PROCALCITONIN (PCT): CPT | Performed by: HOSPITALIST

## 2022-11-06 PROCEDURE — 96374 THER/PROPH/DIAG INJ IV PUSH: CPT

## 2022-11-06 PROCEDURE — 258N000003 HC RX IP 258 OP 636: Performed by: EMERGENCY MEDICINE

## 2022-11-06 PROCEDURE — 96375 TX/PRO/DX INJ NEW DRUG ADDON: CPT

## 2022-11-06 PROCEDURE — 99219 PR INITIAL OBSERVATION CARE,LEVEL II: CPT | Performed by: HOSPITALIST

## 2022-11-06 PROCEDURE — 71046 X-RAY EXAM CHEST 2 VIEWS: CPT

## 2022-11-06 PROCEDURE — 84484 ASSAY OF TROPONIN QUANT: CPT | Performed by: HOSPITALIST

## 2022-11-06 RX ORDER — AMOXICILLIN 250 MG
1 CAPSULE ORAL DAILY PRN
Status: ON HOLD | COMMUNITY
Start: 2021-10-04 | End: 2022-11-12

## 2022-11-06 RX ORDER — AMOXICILLIN 250 MG
1 CAPSULE ORAL DAILY PRN
Status: DISCONTINUED | OUTPATIENT
Start: 2022-11-06 | End: 2022-11-12 | Stop reason: HOSPADM

## 2022-11-06 RX ORDER — CARBIDOPA AND LEVODOPA 25; 100 MG/1; MG/1
2 TABLET, EXTENDED RELEASE ORAL AT BEDTIME
Status: DISCONTINUED | OUTPATIENT
Start: 2022-11-06 | End: 2022-11-12 | Stop reason: HOSPADM

## 2022-11-06 RX ORDER — ACETAMINOPHEN 500 MG
1000 TABLET ORAL 3 TIMES DAILY
Status: ON HOLD | COMMUNITY
End: 2022-11-12

## 2022-11-06 RX ORDER — GLYCOPYRROLATE 1 MG/1
1 TABLET ORAL EVERY MORNING
Status: DISCONTINUED | OUTPATIENT
Start: 2022-11-06 | End: 2022-11-12 | Stop reason: HOSPADM

## 2022-11-06 RX ORDER — CARBIDOPA AND LEVODOPA 25; 100 MG/1; MG/1
2 TABLET ORAL 2 TIMES DAILY
Status: DISCONTINUED | OUTPATIENT
Start: 2022-11-06 | End: 2022-11-06 | Stop reason: DRUGHIGH

## 2022-11-06 RX ORDER — ACETAMINOPHEN 325 MG/1
650 TABLET ORAL EVERY 6 HOURS PRN
Status: DISCONTINUED | OUTPATIENT
Start: 2022-11-06 | End: 2022-11-12 | Stop reason: HOSPADM

## 2022-11-06 RX ORDER — ONDANSETRON 4 MG/1
4 TABLET, ORALLY DISINTEGRATING ORAL EVERY 6 HOURS PRN
Status: DISCONTINUED | OUTPATIENT
Start: 2022-11-06 | End: 2022-11-12 | Stop reason: HOSPADM

## 2022-11-06 RX ORDER — PIMAVANSERIN TARTRATE 34 MG/1
34 CAPSULE ORAL DAILY PRN
Status: ON HOLD | COMMUNITY
Start: 2022-05-31 | End: 2022-11-12

## 2022-11-06 RX ORDER — CARBIDOPA AND LEVODOPA 50; 200 MG/1; MG/1
1 TABLET, EXTENDED RELEASE ORAL AT BEDTIME
Status: DISCONTINUED | OUTPATIENT
Start: 2022-11-06 | End: 2022-11-06

## 2022-11-06 RX ORDER — KETOCONAZOLE 20 MG/ML
SHAMPOO TOPICAL
Status: ON HOLD | COMMUNITY
Start: 2022-10-17 | End: 2022-11-12

## 2022-11-06 RX ORDER — METHOCARBAMOL 500 MG/1
250 TABLET, FILM COATED ORAL DAILY PRN
Status: ON HOLD | COMMUNITY
Start: 2022-03-01 | End: 2022-11-12

## 2022-11-06 RX ORDER — CEFTRIAXONE 1 G/1
1 INJECTION, POWDER, FOR SOLUTION INTRAMUSCULAR; INTRAVENOUS EVERY 24 HOURS
Status: DISCONTINUED | OUTPATIENT
Start: 2022-11-06 | End: 2022-11-07

## 2022-11-06 RX ORDER — CARBIDOPA AND LEVODOPA 25; 100 MG/1; MG/1
2 TABLET ORAL 2 TIMES DAILY
Status: DISCONTINUED | OUTPATIENT
Start: 2022-11-06 | End: 2022-11-12 | Stop reason: HOSPADM

## 2022-11-06 RX ORDER — ONDANSETRON 2 MG/ML
4 INJECTION INTRAMUSCULAR; INTRAVENOUS EVERY 6 HOURS PRN
Status: DISCONTINUED | OUTPATIENT
Start: 2022-11-06 | End: 2022-11-12 | Stop reason: HOSPADM

## 2022-11-06 RX ORDER — METHOCARBAMOL 500 MG/1
250 TABLET ORAL DAILY PRN
Status: DISCONTINUED | OUTPATIENT
Start: 2022-11-06 | End: 2022-11-08

## 2022-11-06 RX ORDER — CARBIDOPA AND LEVODOPA 25; 100 MG/1; MG/1
1 TABLET ORAL 2 TIMES DAILY
Status: DISCONTINUED | OUTPATIENT
Start: 2022-11-06 | End: 2022-11-12 | Stop reason: HOSPADM

## 2022-11-06 RX ORDER — GLYCOPYRROLATE 1 MG/1
1 TABLET ORAL 2 TIMES DAILY PRN
Status: DISCONTINUED | OUTPATIENT
Start: 2022-11-06 | End: 2022-11-12 | Stop reason: HOSPADM

## 2022-11-06 RX ORDER — FAMOTIDINE 20 MG/1
20 TABLET, FILM COATED ORAL DAILY
Status: DISCONTINUED | OUTPATIENT
Start: 2022-11-06 | End: 2022-11-12 | Stop reason: HOSPADM

## 2022-11-06 RX ORDER — VANCOMYCIN HYDROCHLORIDE 1 G/200ML
1000 INJECTION, SOLUTION INTRAVENOUS EVERY 12 HOURS
Status: DISCONTINUED | OUTPATIENT
Start: 2022-11-06 | End: 2022-11-11

## 2022-11-06 RX ORDER — FAMOTIDINE 20 MG/1
20 TABLET, FILM COATED ORAL DAILY
Status: ON HOLD | COMMUNITY
End: 2022-11-12

## 2022-11-06 RX ORDER — ACETAMINOPHEN 650 MG/1
650 SUPPOSITORY RECTAL EVERY 6 HOURS PRN
Status: DISCONTINUED | OUTPATIENT
Start: 2022-11-06 | End: 2022-11-12 | Stop reason: HOSPADM

## 2022-11-06 RX ADMIN — ACETAMINOPHEN 650 MG: 325 TABLET, FILM COATED ORAL at 17:43

## 2022-11-06 RX ADMIN — CARBIDOPA AND LEVODOPA 1 HALF-TAB: 25; 100 TABLET ORAL at 15:46

## 2022-11-06 RX ADMIN — ACETAMINOPHEN 650 MG: 325 TABLET, FILM COATED ORAL at 01:14

## 2022-11-06 RX ADMIN — CARBIDOPA AND LEVODOPA 2 TABLET: 25; 100 TABLET, EXTENDED RELEASE ORAL at 01:33

## 2022-11-06 RX ADMIN — CARBIDOPA AND LEVODOPA 1 TABLET: 25; 100 TABLET ORAL at 15:46

## 2022-11-06 RX ADMIN — VANCOMYCIN HYDROCHLORIDE 1000 MG: 1 INJECTION, SOLUTION INTRAVENOUS at 20:27

## 2022-11-06 RX ADMIN — CARBIDOPA AND LEVODOPA 2 TABLET: 25; 100 TABLET ORAL at 20:05

## 2022-11-06 RX ADMIN — SODIUM CHLORIDE: 9 INJECTION, SOLUTION INTRAVENOUS at 01:08

## 2022-11-06 RX ADMIN — METHOCARBAMOL 250 MG: 500 TABLET ORAL at 13:52

## 2022-11-06 RX ADMIN — ACETAMINOPHEN 650 MG: 325 TABLET, FILM COATED ORAL at 11:21

## 2022-11-06 RX ADMIN — CEFTRIAXONE 1 G: 1 INJECTION, POWDER, FOR SOLUTION INTRAMUSCULAR; INTRAVENOUS at 01:22

## 2022-11-06 RX ADMIN — FAMOTIDINE 20 MG: 20 TABLET ORAL at 12:51

## 2022-11-06 RX ADMIN — CARBIDOPA AND LEVODOPA 2 TABLET: 25; 100 TABLET ORAL at 11:20

## 2022-11-06 ASSESSMENT — ACTIVITIES OF DAILY LIVING (ADL)
ADLS_ACUITY_SCORE: 41
ADLS_ACUITY_SCORE: 45
ADLS_ACUITY_SCORE: 41
ADLS_ACUITY_SCORE: 35
DEPENDENT_IADLS:: CLEANING;COOKING;LAUNDRY;SHOPPING;MEAL PREPARATION;MEDICATION MANAGEMENT;TRANSPORTATION;MONEY MANAGEMENT
ADLS_ACUITY_SCORE: 45

## 2022-11-06 NOTE — PLAN OF CARE
PRIMARY DIAGNOSIS: GENERALIZED WEAKNESS    OUTPATIENT/OBSERVATION GOALS TO BE MET BEFORE DISCHARGE  1. Orthostatic performed: No    2. Tolerating PO medications: Yes    3. Return to near baseline physical activity: No    4. Cleared for discharge by consultants (if involved): No    Discharge Planner Nurse   Safe discharge environment identified: No  Barriers to discharge:  pending        Entered by: Eric Stout RN 11/06/2022 3:16 AM     Please review provider order for any additional goals.   Nurse to notify provider when observation goals have been met and patient is ready for discharge.Goal Outcome Evaluation:  Patient comes from [facility] with multiple falls, bruises, abrasions, yareli lac,bruised opital preestn.  Patient forgetful but reorientedf.  No axcute needs seen will monitor.

## 2022-11-06 NOTE — CONSULTS
Care Management Initial Consult    General Information  Assessment completed with: Patient,    Type of CM/SW Visit: Initial Assessment    Primary Care Provider verified and updated as needed: Yes   Readmission within the last 30 days: no previous admission in last 30 days      Reason for Consult: discharge planning    Communication Assessment  Patient's communication style: spoken language (English or Bilingual)    Hearing Difficulty or Deaf: no   Wear Glasses or Blind: yes    Cognitive  Cognitive/Neuro/Behavioral: .WDL except, mood/behavior                      Living Environment:   People in home: facility resident     Current living Arrangements: assisted living      Able to return to prior arrangements: yes     Family/Social Support:  Care provided by: self, homecare agency  Provides care for: no one, unable/limited ability to care for self  Marital Status:   Children          Description of Support System: Supportive, Involved       Current Resources:   Patient receiving home care services: No  Community Resources: None  Equipment currently used at home: walker, rolling, grab bar, tub/shower, grab bar, toilet  Supplies currently used at home: None    Lifestyle & Psychosocial Needs:  Social Determinants of Health     Tobacco Use: Low Risk      Smoking Tobacco Use: Never     Smokeless Tobacco Use: Never     Passive Exposure: Not on file   Alcohol Use: Not on file   Financial Resource Strain: Not on file   Food Insecurity: Not on file   Transportation Needs: Not on file   Physical Activity: Not on file   Stress: Not on file   Social Connections: Not on file   Intimate Partner Violence: Not on file   Depression: Not on file   Housing Stability: Not on file     Functional Status:  Prior to admission patient needed assistance:   Dependent ADLs:: Ambulation-walker, Bathing  Dependent IADLs:: Cleaning, Cooking, Laundry, Shopping, Meal Preparation, Medication Management, Transportation, Money Management      Additional Information:  CM consulted for discharge planning, met with pt at bedside to assess for needs. Pt reports that she lives at [facility] in [facility] [phone]. Pt reports that she ambulates independently with WW at baseline but has had multiple falls recently and [facility] staff feel she should maybe use a WC instead of WW. [facility] staff assist with bathing, medication management and meals. Pt does her own dressing, grooming and toileting.     Discussed that PT will evaluate mobility and make recommendations for safe discharge. Pt would be agreeable to home PT if this is recommended. If so, she would like referral sent to [facility] or [facility]. Will follow-up to discuss further if PT recommends TCU.     Reviewed out of pocket cost for [facility] WC transport, $81.80 for base rate and $5.26 per mile to the destination. Spoke with pt, they expressed understanding and are agreeable to this. Will arrange WC ride at discharge.     Called [facility] to verify pt's information, get pharmacy and fax number. Unable to reach via phone, LM requesting call back.    Kendra Hernandez RN BSN   Inpatient Care Coordination  [facility]   Phone [phone]

## 2022-11-06 NOTE — H&P
[facility]    History and Physical - Hospitalist Service       Date of Admission:  11/5/2022    Assessment & Plan     Pablo Baez is a 77 year old female w/PMH parkinsons disease, UTI, BPPV, cognitive impairment who [facility].    Recurrent falls  Hx parkinsons disease   -has long history of falls, was hospitalized here in January 2022 for similar presentation. Seems to have her falls in clusters, per patient usually related to UTI. She's reporting some dysuria but UA is unremarkable  -imaging without acute fractures or concerning findings  -admit to obs and will have PT evaluate her, may need higher level of care than penitentiary but patient is pretty resistant  -trial of antibiotics  -during last admission had seroquel dose decreased from 25 to 12.5 for fear of contributing to falls, doesn't appear sedated on my evaluation    Possible UTI  -she's reporting some changes in urination but UA rather unremarkable, leukocytosis has now resolved  -trial of Rocephin as potential benefit could be significant but may not even have UTI  -urine culture not reflexed but I ordered one, check procal    Troponin and CK elevation  -minimally elevated, likely related to falls  -trend trops and encourage to drink fluids  -limit falls    Hx BPPV, cognitive impairment  -had low slums score her admission in Jan, seems pretty well oriented for me  -resume home meds     Diet:   regular diet  DVT Prophylaxis: Pneumatic Compression Devices  Sales Catheter: Not present  Central Lines: None  Cardiac Monitoring: None  Code Status:   full code     The patient's care was discussed with the Patient and ED Team.    Pee Mon DO  Hospitalist Service  [facility]  Securely message with the Vocera Web Console (learn more here)  Text page via [employer] Paging/Directory   ______________________________________________________________________    Chief Complaint   Dysuria,  falls    History of Present Illness   Pablo Baez is a 77 year old female w/PMH parkinsons disease, UTI, BPPV, cognitive impairment who presents from her [facility] with recurrent falls. She was brought in earlier on saturday after a fall and had her forehead stapled and was sent back to [facility]. However presented after another fall after that and brought back to ED. She admits to hx of falls but seem to cluster after UTI's per her. She reports over the past week having a change in the smell of her urine but denies dysuria per se or increased frequency. Feels like she may have had a subjective fever this week.    In ED workup was rather unremarkable, afebrile with leukocytosis earlier today but now resolved. UA also not very impressive. CK slightly elevated. CT head/neck negative for acute findings    Review of Systems    The 10 point Review of Systems is negative other than noted in the HPI or here.    Past Medical History    parkinsons disease, UTI, BPPV, cognitive impairment    Past Surgical History   Umbilical hernia repair  Shoulder surgery  Cervical spine fusion  Hip surgery after fracture    Social History   Lives in [facility]    Social History     Tobacco Use     Smoking status: Never     Smokeless tobacco: Never       Family History   Family history reviewed and is non contributory    Prior to Admission Medications   Prior to Admission Medications   Prescriptions Last Dose Informant Patient Reported? Taking?   QUEtiapine (SEROQUEL) 25 MG tablet   Yes No   Sig: Take 12.5 mg by mouth daily as needed (hallucinations)   QUEtiapine (SEROQUEL) 25 MG tablet   No No   Sig: Take 0.5 tablets (12.5 mg) by mouth At Bedtime   carbidopa-levodopa (SINEMET CR)  MG CR tablet   Yes No   Sig: Take 1 tablet by mouth daily at 11pm.  Indications: Parkinson's Disease   carbidopa-levodopa (SINEMET)  MG tablet   Yes No   Sig: Take 1.5 tablets by mouth 2 times daily AM and noon   carbidopa-levodopa (SINEMET)  MG tablet   Yes  No   Sig: Take 2 tablets by mouth 2 times daily Afternoon and bedtime   cholecalciferol 50 MCG (2000 UT) tablet   Yes No   Sig: Take 2,000 Units by mouth daily   glycopyrrolate (ROBINUL) 1 MG tablet   Yes No   Sig: Take 1 tablet by mouth every morning   glycopyrrolate (ROBINUL) 1 MG tablet   Yes No   Sig: Take 1 mg by mouth 2 times daily as needed for other (salivation)   neomycin-polymixin-dexamethasone (MAXITROL) ophthalmic ointment   Yes No   Sig: Place 0.25 inches into both eyes At Bedtime   polyvinyl alcohol (LIQUIFILM TEARS) 1.4 % ophthalmic solution   Yes No   Sig: Place 1 drop into both eyes every 4 hours as needed      Facility-Administered Medications: None     Allergies   Allergies   Allergen Reactions     Lisinopril Cough       Physical Exam   Vital Signs: Temp: 100.1  F (37.8  C)   BP: (!) 166/75 Pulse: 77   Resp: 18 SpO2: 93 %      Weight: 114 lbs 0 oz    Constitutional: awake, alert and cooperative, no distress  Eyes: pupils equal, round and reactive to light and conjunctiva normal  ENT: normocepalic, without obvious abnormality, atramatic  Respiratory: no increased work of breathing, good air exchange and clear to auscultation  Cardiovascular: regular rate and rhythm and no murmur noted  GI: normal bowel sounds, soft, non-distended and non-tender  Skin: bruising to face with staples in head  Neurologic: alert, oriented x3 or so, strength intact but some rigidity noted and masked facies    Data   Data reviewed today: I reviewed all medications, new labs and imaging results over the last 24 hours.     Recent Labs   Lab 11/05/22 2154 11/05/22  1145   WBC 10.9 13.9*   HGB 11.7 13.1   * 106*    300   INR  --  1.21*    138   POTASSIUM 3.6 4.0   CHLORIDE 102 99   CO2 27 27   BUN 12.8 15.3   CR 0.47* 0.51   ANIONGAP 9 12   GIOVANI 9.4 9.8   * 118*     Most Recent 3 CBC's:Recent Labs   Lab Test 11/05/22 2154 11/05/22  1145 01/15/22  0558 01/12/22  0908   WBC 10.9 13.9*  --  7.1   HGB  11.7 13.1  --  13.2   * 106*  --  108*    300 288 326     Most Recent 3 BMP's:Recent Labs   Lab Test 11/05/22  2154 11/05/22  1145 08/31/22  1140    138 140  140   POTASSIUM 3.6 4.0 4.4  4.4   CHLORIDE 102 99 102  102   CO2 27 27 25  25   BUN 12.8 15.3 16.7  16.7   CR 0.47* 0.51 0.55  0.55   ANIONGAP 9 12 13  13   GIOVANI 9.4 9.8 9.9  9.9   * 118* 83  83     Most Recent 2 LFT's:Recent Labs   Lab Test 08/31/22  1140   AST 30   ALT 7*   ALKPHOS 63   BILITOTAL 0.5     Most Recent 3 INR's:Recent Labs   Lab Test 11/05/22  1145   INR 1.21*     10.9    \    11.7    /    274   N 83    L N/A    138    102    12.8 /   ------------------------------------ 119 (H)   ALT N/A   AST N/A   AP N/A   ALB N/A   Ca 9.4  3.6    27    0.47 (L) \    % RETIC N/A    LDH N/A  Troponin N/A    BNP N/A     (H)  INR 1.21 (H)   PTT 34    D-dimer N/A    Fibrinogen N/A    Antithrombin N/A  Ferritin N/A  CRP N/A    IL-6 N/A  Recent Results (from the past 24 hour(s))   CT Head w/o Contrast    Narrative    EXAM: CT HEAD WITHOUT CONTRAST  LOCATION: [facility]  DATE/TIME: 11/05/2022, 12:43 PM    INDICATION: Fall. Trauma. Pain.  COMPARISON: Head CT 11/01/2022.  TECHNIQUE: Routine CT Head without IV contrast. Multiplanar reformats. Dose reduction techniques were used.    FINDINGS:  INTRACRANIAL CONTENTS: No intracranial hemorrhage, extra-axial collection, or mass effect.  No CT evidence of acute infarct. Normal parenchymal attenuation. Normal ventricles and sulci.     VISUALIZED ORBITS/SINUSES/MASTOIDS: No intraorbital abnormality. No paranasal sinus mucosal disease. No middle ear or mastoid effusion.    BONES/SOFT TISSUES: Right parietal scalp hematoma/laceration again noted.      Impression    IMPRESSION:  1.  Right parietal scalp hematoma/laceration again noted.  2.  Otherwise, normal head CT.     CT Cervical Spine w/o Contrast    Narrative    EXAM: CT CERVICAL SPINE WITHOUT  CONTRAST  LOCATION: [facility]  DATE/TIME: 11/05/2022, 12:44 PM    INDICATION: Fall. Trauma. Pain.  COMPARISON: CT cervical spine 11/01/2022.  TECHNIQUE: Routine CT Cervical Spine without IV contrast. Multiplanar reformats. Dose reduction techniques were used.      Impression    IMPRESSION: ACDF from C3 down to C6 with corpectomy of C4 again noted. Degenerative change of the cervical spine again noted without change from the recent comparison study. No fracture. Alignment remains normal. No spinal canal stenosis. No prevertebral   soft tissue swelling.     XR Shoulder Left G/E 3 Views    Narrative    EXAM: XR SHOULDER LEFT G/E 3 VIEWS  LOCATION: [facility]  DATE/TIME: 11/5/2022 2:00 PM    INDICATION: fall, pain  COMPARISON: None.      Impression    IMPRESSION: There is irregularity in the posterior aspect of the left mid ribs, some of which could represent chronic fracture deformities, however superimposed acute component is also possible in the appropriate clinical setting. No acute fracture or   dislocation of the shoulder. Moderate to severe acromioclavicular and moderate glenohumeral joint degenerative changes. Partially visualized with surgical changes in the neck. Hazy interstitial opacities in the lungs incompletely evaluated.    NOTE: ABNORMAL REPORT    THE DICTATION ABOVE DESCRIBES AN ABNORMALITY FOR WHICH FOLLOW-UP IS NEEDED.    CT Head w/o Contrast    Narrative    EXAM: CT HEAD W/O CONTRAST  LOCATION: [facility]  DATE/TIME: 11/5/2022 10:42 PM    INDICATION: Fall, head injury, neck injury.  COMPARISON: CT head 11/05/2022 12:43 PM  TECHNIQUE: Routine CT Head without IV contrast. Multiplanar reformats. Dose reduction techniques were used.    FINDINGS:  INTRACRANIAL CONTENTS: No intracranial hemorrhage, extraaxial collection, or mass effect.  No CT evidence of acute infarct. Mild presumed chronic small vessel ischemic changes. Mild  generalized volume loss. No hydrocephalus.     VISUALIZED ORBITS/SINUSES/MASTOIDS: No intraorbital abnormality. No paranasal sinus mucosal disease. No middle ear or mastoid effusion.    BONES/SOFT TISSUES: Surgical staples right parietal region.      Impression    IMPRESSION:  1.  No acute intracranial process.   CT Cervical Spine w/o Contrast    Narrative    EXAM: CT CERVICAL SPINE W/O CONTRAST  LOCATION: [facility]  DATE/TIME: 11/5/2022 10:43 PM    INDICATION: Fall, head injury, neck injury, neck pain  COMPARISON: 11/05/2020 02/12/1944.  TECHNIQUE: Routine CT Cervical Spine without IV contrast. Multiplanar reformats. Dose reduction techniques were used.    FINDINGS:  VERTEBRA: Normal vertebral body heights and alignment. No fracture or posttraumatic subluxation. Status post ACDF C3-C6 with corpectomy of C4. No evidence of hardware failure.    CANAL/FORAMINA: Moderate to severe central canal stenosis at C2-C3 with moderate severe bilateral foraminal stenosis. At C6-C7 there is moderate central canal stenosis.    PARASPINAL: Slightly prominent prevertebral soft issues at C6-C7 unchanged from prior CT done earlier today.      Impression    IMPRESSION:  1.  No fracture or posttraumatic subluxation.

## 2022-11-06 NOTE — PLAN OF CARE
"PRIMARY DIAGNOSIS: GENERALIZED WEAKNESS, falls    OUTPATIENT/OBSERVATION GOALS TO BE MET BEFORE DISCHARGE  1. Orthostatic performed: No    2. Tolerating PO medications: Yes    3. Return to near baseline physical activity: No - up with assist of 2 and walker to the chair, needed a lot of assist from laying to standing    4. Cleared for discharge by consultants (if involved): No - PT currently scheduled for 1415    Discharge Planner Nurse   Safe discharge environment identified: Yes  Barriers to discharge: Yes       Entered by: Rashid Ahn RN 11/06/2022 8:55 AM  Pt A&O, forgetful at times. Sutures to r) face near eye, periorbital bruising. Staples to head x3 areas from recent falls. Scattered abrasions & bruising.  Plan for PT today, IV rocephin for possible UTI. Hearing voices and having conversations with \"Emerson's friend\", RN informed pt that no one except her present. Pt stopped her conversation and reports she can't see him. Spoke with daughter this morning who reports that talking to \"dead people\" is baseline for pt.  Discharge TBD, need PT recs.     Please review provider order for any additional goals.   Nurse to notify provider when observation goals have been met and patient is ready for discharge.  "

## 2022-11-06 NOTE — ED TRIAGE NOTES
3 falls in past 2 weeks. Pt seen prior today for fall and had staples. Pt has bruising and edema R eye from fall previous. Today arrives in C collar after falling and hitting head. Laceration L posterior head, chronic bilateral shoulder pain. Pt very confused and poor historian. ABC in tact. A/OX1-2

## 2022-11-06 NOTE — ED NOTES
[facility]  ED Nurse Handoff Report    Pablo Baez is a 77 year old female   ED Chief complaint: Fall  . ED Diagnosis:   Final diagnoses:   None     Allergies:   Allergies   Allergen Reactions     Lisinopril Cough       Code Status: Full Code  Activity level - Baseline/Home:  Independent. Activity Level - Current:   Assist X 1. Lift room needed: No. Bariatric: No   Needed: No   Isolation: Yes. Infection: Not Applicable  COVID r/o and special precautions.     Vital Signs:   Vitals:    11/05/22 2248 11/05/22 2249 11/05/22 2250 11/05/22 2251   BP:       Pulse:       Resp:       Temp:       SpO2: 95% 94% 95% 95%   Weight:           Cardiac Rhythm:  ,      Pain level:    Patient confused: Yes. Patient Falls Risk: Yes.   Elimination Status: Has voided , brief in place  Patient Report - Initial Complaint: fall. Focused Assessment: Pablo Baez is a 77 year old female with history of Parkinson's disease, hypertension, and multiple falls who presents via with fall. Per EMS, the patient has had multiple falls within the last 2 weeks, and per chart review, she was here this morning after a fall. She has bruising to her right eye from a fall that happened a couple weeks ago and staples in her scalp from a fall that happened on 11/1. She has a C-collar on here. The patient presents here tonight because she had a fall in her kitchen following an episode of dizziness and weakness. She is having neck pain here. No syncope. She lives in an assisted living facility and uses a walker for gait.      Review of Systems   Musculoskeletal: Positive for neck pain.   Neurological: Negative for syncope.   All other systems reviewed and are negative.     2127  ED Triage Notes Filed ED Triage Notes Filed JJ    3 falls in past 2 weeks. Pt seen prior today for fall and had staples. Pt has bruising and edema R eye from fall previous. Today arrives in C collar after falling and hitting head. Laceration L  posterior head, chronic bilateral shoulder pain. Pt very confused and poor historian. ABC in tact. A/OX1-2        Tests Performed: labs imaging. Abnormal Results:   Labs Ordered and Resulted from Time of ED Arrival to Time of ED Departure   BASIC METABOLIC PANEL - Abnormal       Result Value    Sodium 138      Potassium 3.6      Chloride 102      Carbon Dioxide (CO2) 27      Anion Gap 9      Urea Nitrogen 12.8      Creatinine 0.47 (*)     Calcium 9.4      Glucose 119 (*)     GFR Estimate >90     CBC WITH PLATELETS AND DIFFERENTIAL - Abnormal    WBC Count 10.9      RBC Count 3.44 (*)     Hemoglobin 11.7      Hematocrit 36.8       (*)     MCH 34.0 (*)     MCHC 31.8      RDW 12.3      Platelet Count 274      % Neutrophils 83      % Lymphocytes 7      % Monocytes 10      % Eosinophils 0      % Basophils 0      % Immature Granulocytes 0      NRBCs per 100 WBC 0      Absolute Neutrophils 9.0 (*)     Absolute Lymphocytes 0.8      Absolute Monocytes 1.1      Absolute Eosinophils 0.0      Absolute Basophils 0.0      Absolute Immature Granulocytes 0.0      Absolute NRBCs 0.0     LACTIC ACID WHOLE BLOOD - Normal    Lactic Acid 0.8     ROUTINE UA WITH MICROSCOPIC REFLEX TO CULTURE   INFLUENZA A/B & SARS-COV2 PCR MULTIPLEX   BLOOD CULTURE   BLOOD CULTURE     CT Head w/o Contrast    (Results Pending)   CT Cervical Spine w/o Contrast    (Results Pending)     .   Treatments provided: frequent vs, fluids  Family Comments: n/a  OBS brochure/video discussed/provided to patient:  No  ED Medications:   Medications   0.9% sodium chloride BOLUS (1,000 mLs Intravenous New Bag 11/5/22 2200)     Followed by   sodium chloride 0.9% infusion (has no administration in time range)   lido-EPINEPHrine-tetracaine (LET) 4-0.18-0.5 % topical gel GEL (3 mLs  Given 11/5/22 2200)     Drips infusing:  No  For the majority of the shift, the patient's behavior Green. Interventions performed were n/a.    Sepsis treatment initiated: No     Patient  tested for COVID 19 prior to admission: YES    ED Nurse Name/Phone Number: Maricel Hinojosa RN,   10:52 PM    RECEIVING UNIT ED HANDOFF REVIEW    Above ED Nurse Handoff Report was reviewed: Yes  Reviewed by: Hailey Angel RN on November 6, 2022 at 1:24 AM

## 2022-11-06 NOTE — ED NOTES
Bed: ED35  Expected date: 11/5/22  Expected time:   Means of arrival: Ambulance  Comments:  Deyvi Adkins1

## 2022-11-06 NOTE — PLAN OF CARE
"PRIMARY DIAGNOSIS: GENERALIZED WEAKNESS, falls    OUTPATIENT/OBSERVATION GOALS TO BE MET BEFORE DISCHARGE  1. Orthostatic performed: No    2. Tolerating PO medications: Yes    3. Return to near baseline physical activity: No - ambulation improved after sinimet - now assist of 1 but still needing assist from laying to standing.     4. Cleared for discharge by consultants (if involved): No - PT currently scheduled for 1415    Discharge Planner Nurse   Safe discharge environment identified: Yes  Barriers to discharge: Yes       Entered by: Rashid Ahn RN 11/06/2022 2:29 PM    Pt A&O, forgetful at times. Sutures to r) face near eye, periorbital bruising. Staples to head x3 areas from recent falls. Scattered abrasions & bruising.  Plan for PT today, IV rocephin for possible UTI - cultures pending. Hearing voices and having conversations with \"Emerson's friend\", RN informed pt that no one except her present. Pt stopped her conversation and reports she can't see him. Spoke with daughter this morning who reports that talking to \"dead people\" is baseline for pt.    Please review provider order for any additional goals.   Nurse to notify provider when observation goals have been met and patient is ready for discharge.  "

## 2022-11-06 NOTE — PLAN OF CARE
ROOM # 224  Living Situation (if not independent, order SW consult):  Facility name: [facility]  : Maye, daughter [phone]    Activity level at baseline: independent w/ walker  Activity level on admit: assist x2    Who will be transporting you at discharge: TBD    Patient registered to observation; given Patient Bill of Rights; given the opportunity to ask questions about observation status and their plan of care.  Patient has been oriented to the observation room, bathroom and call light is in place.    Discussed discharge goals and expectations with patient/family.

## 2022-11-06 NOTE — PHARMACY-ADMISSION MEDICATION HISTORY
Admission medication history interview status for this patient is complete. See [facility] admission navigator for allergy information, prior to admission medications and immunization status.     Medication history interview done, indicate source(s): Facility triage RN over the phone  Medication history resources (including written lists, pill bottles, clinic record): Sahil  Pharmacy: unknown    Changes made to PTA medication list:  Added: APAP, famotidine, Ketoconazole, multivitamin, methocarbamol, Nuplazis, Sennokot-S  Changed: none  Reported as Not Taking: Seroquel, Maxitrol eye oint, Robinul 1mg bid prn  Removed:  Maxitrol eye oint, Robinul 1mg bid prn    Actions taken by pharmacist (provider contacted, etc):None     Additional medication history information:None    Medication reconciliation/reorder completed by provider prior to medication history?  Y   (Y/N)       Prior to Admission medications    Medication Sig Last Dose Taking? Auth Provider Long Term End Date   acetaminophen (TYLENOL) 500 MG tablet Take 1,000 mg by mouth 3 times daily At 0800, 1400, 2000 11/5/2022 Yes Unknown, Entered By History     carbidopa-levodopa (SINEMET CR)  MG CR tablet Take 1 tablet by mouth daily at 11pm.  Indications: Parkinson's Disease 11/4/2022 at 2300 Yes Unknown, Entered By History Yes    carbidopa-levodopa (SINEMET)  MG tablet Take 1.5 tablets by mouth 2 times daily 0800 and 1600 11/5/2022 Yes Unknown, Entered By History Yes    carbidopa-levodopa (SINEMET)  MG tablet Take 2 tablets by mouth 2 times daily 1200 and 2000 11/5/2022 Yes Unknown, Entered By History Yes    cholecalciferol 50 MCG (2000 UT) tablet Take 2,000 Units by mouth daily 11/5/2022 at 0800 Yes Unknown, Entered By History     famotidine (PEPCID) 20 MG tablet Take 20 mg by mouth daily At 0800 11/5/2022 at 0800 Yes Unknown, Entered By History No    glycopyrrolate (ROBINUL) 1 MG tablet Take 1 tablet by mouth every morning 11/5/2022 at 0800 Yes  Unknown, Entered By History     ketoconazole (NIZORAL) 2 % external shampoo Apply topically every 7 days  at PRN Yes Unknown, Entered By History No    methocarbamol (ROBAXIN) 500 MG tablet Take 250 mg by mouth daily as needed  at PRN Yes Unknown, Entered By History No    Multiple Vitamins-Minerals (HM MULTIVITAMIN ADULT GUMMY PO) Take 2 chew tab by mouth daily 11/5/2022 at 0800 Yes Unknown, Entered By History     NUPLAZID 34 MG capsule Take 34 mg by mouth daily as needed (hallucinations)  at PRN Yes Unknown, Entered By History     polyvinyl alcohol (LIQUIFILM TEARS) 1.4 % ophthalmic solution Place 1 drop into both eyes every 4 hours as needed  at PRN Yes Unknown, Entered By History     senna-docusate (SENOKOT-S/PERICOLACE) 8.6-50 MG tablet Take 1 tablet by mouth daily as needed  at PRN Yes Unknown, Entered By History     QUEtiapine (SEROQUEL) 25 MG tablet Take 0.5 tablets (12.5 mg) by mouth At Bedtime  Patient not taking: Reported on 11/6/2022 Not Taking  Reyna Foster PA-C Yes

## 2022-11-06 NOTE — PLAN OF CARE
PRIMARY DIAGNOSIS: GENERALIZED WEAKNESS, falls    OUTPATIENT/OBSERVATION GOALS TO BE MET BEFORE DISCHARGE  1. Orthostatic performed: No    2. Tolerating PO medications: Yes    3. Return to near baseline physical activity: No    4. Cleared for discharge by consultants (if involved): No    Discharge Planner Nurse   Safe discharge environment identified: Yes  Barriers to discharge: Yes       Entered by: Hailey Angel RN 11/06/2022 5:35 AM  Pt A&O, forgetful at times. Sutures to r) face near eye, periorbital bruising. Staples to head x3 areas from recent falls. Scattered abrasions & bruising. Has been resting since admission. Plan for PT today, IV rocephin for possible UTI.  Discharge TBD, need PT recs.     Please review provider order for any additional goals.   Nurse to notify provider when observation goals have been met and patient is ready for discharge.

## 2022-11-06 NOTE — PROGRESS NOTES
[facility]  Internal Medicine  Progress Note    Date of Service: 11/6/2022    Patient: Pablo Baez  MRN: [id number]  Admission Date: 11/5/2022      Assessment & Plan: Pablo Baez is a 77 year old female w/PMH parkinsons disease, UTI, BPPV, cognitive impairment who presents from her shelter with recurrent falls.      Recurrent Falls  Close Head Injury  Hx Parkinson's Disease with Gait Instability - pt presents from [facility] with frequent falls.  Seen in the ED on 11/1 with laceration repair to the right scalp with staples and right eyebrow repair with sutures.  CT head with scalp hematomas and c/s was negative and discharge back to shelter.    Seen in the ED 11/5 am after a mechanical fall with laceration to her left parietal scalp with 2 staples placed and discharge back to her [facility].  CT head and C/S with no acute abnormality.  Patient returned to the ED again on 11/5 later in the day at her shelter with another fall with generalized weakness and found to have a low grade fever of 100.1.  A Laceration of the left posterior scalp was repaired with staples.  - PT consult  - infectious work up as below  - check orthostatics and monitor on telemetry    Generalized Weakness  Fever - temp on presentation 100.1. afebrile overnight.  Normal WBC.  Blood Cultures NGTD.  Influeza/RSV/COVID negative.  UA with no clear sign of infection, but strarted on Ceftriaxone empirically.    - pro calcitonin 0.27  - continue Ceftriaxone for now for possible UTI.  Follow up urine culture  - obtain CXR as part of infectious work up.    - pt also reports she recently received her covid and influenza vaccines so possible low grade fever is 2/2 vaccines as well.    Parkinson's Disease  Cognitive Impairment - parkinson's disease diagnosed 10/2018.  Followed by Neurology through .  Has baseline significant gait instability, orofacial dyskinesia and hallucinations.  SLUMS score 1/2022 was 21/30.  - continue pta Sinemet, Robinul and  Seroquel.      Troponin and CK elevation - -minimally elevated, likely related to falls.  Trop flat.  No chest pain.  CK now normal after IVF hydration.    Hx Chronic Neck Pain - h/o cervical spondylosis s/p cervical fusion.  CT c/s with no acute abnormalities.      CODE: full  DVT: scd  Diet/fluids: regular      Deborah De Leon MS DOS SANTOS  Hospitalist Physician Assistant  [facility]      Subjective & Interval Hx:    Patient reports three recent falls.  She denies any chest pain, shortness of breath, dizziness, lightheadedness prior to her falls.  She reports she uses her walker with ambulation.  She is unsure why she falls, but reports she does not lose consciousness.  She denies any new cough.  Reports she always has a mild cough.  Denies any abdominal pain, nausea, emesis, dysuria.  Lives in [facility] and gets help with meals and ADLs.  Reports she has intermittent hallucinations at baseline due to her Parkinsons and is on Seroquel to help with that.    Last 24 hr care team notes reviewed.   ROS:  4 point ROS including Respiratory, CV, GI and , other than that noted in the HPI, is negative.    Physical Exam:    Blood pressure (!) 164/81, pulse 89, temperature 97.9  F (36.6  C), temperature source Oral, resp. rate 20, weight 51.7 kg (114 lb), SpO2 93 %.  General: Alert, interactive, NAD  HEENT: sclera anicteric, PERRL, right eye ecchymosis, sutures in place above right eyebrow, Staples to head x3 areas from recent falls  Resp: clear to auscultation bilaterally, no crackles or wheezes  Cardiac: regular rate and rhythm, no murmur  Abdomen: Soft, nontender, nondistended. +BS.  No HSM or masses, no rebound or guarding.  Extremities: No LE edema  Skin: Warm and dry  Neuro: Alert & oriented x 3, moves all extremities equally    Labs & Images:  Reviewed in Epic   Medications:    Current Facility-Administered Medications   Medication     acetaminophen (TYLENOL) tablet 650 mg    Or     acetaminophen (TYLENOL)  Suppository 650 mg     carbidopa-levodopa (SINEMET CR)  MG per CR tablet 2 tablet     carbidopa-levodopa (SINEMET)  MG per tablet 1 tablet    And     carbidopa-levodopa half-tab 12.5-50 mg     carbidopa-levodopa (SINEMET)  MG per tablet 2 tablet     cefTRIAXone (ROCEPHIN) 1 g vial to attach to  mL bag for ADULTS or NS 50 mL bag for PEDS     glycopyrrolate (ROBINUL) tablet 1 mg     glycopyrrolate (ROBINUL) tablet 1 mg     melatonin tablet 1 mg     ondansetron (ZOFRAN ODT) ODT tab 4 mg    Or     ondansetron (ZOFRAN) injection 4 mg     sodium chloride 0.9% infusion

## 2022-11-06 NOTE — PLAN OF CARE
PRIMARY DIAGNOSIS: GENERALIZED WEAKNESS, falls     OUTPATIENT/OBSERVATION GOALS TO BE MET BEFORE DISCHARGE  1. Orthostatic performed: No     2. Tolerating PO medications: Yes     3. Return to near baseline physical activity: No, Ax1 with walker and gait belt     4. Cleared for discharge by consultants (if involved): No - PT to see tomorrow     Discharge Planner Nurse   Safe discharge environment identified: Yes  Barriers to discharge: Yes       Entered by: Marily Siegel     BP (!) 158/85 (BP Location: Left arm)   Pulse 99   Temp 100.2  F (37.9  C) (Axillary)   Resp 20   Wt 51.7 kg (114 lb)   SpO2 93%   BMI 21.54 kg/m      Vitals stable, except low grade fever. Pt alert and oriented x4, forgetful. Bed alarm on for increased safety. Moving Ax1 with walker and gait belt, mobility improving. PT to see tomorrow. IV rocephin scheduled for possible UTI. Continues to have low grade fever, tylenol given. Plan for CXR this evening. Pt resting comfortably. Will continue to monitor and provide supportive cares.        Please review provider order for any additional goals.   Nurse to notify provider when observation goals have been met and patient is ready for discharge.

## 2022-11-06 NOTE — ED PROVIDER NOTES
History   Chief Complaint:  Fall     History limited by: Parkinson's.      Pablo Baez is a 77 year old female with history of Parkinson's disease, hypertension, and multiple falls who presents via with fall. Per EMS, the patient has had multiple falls within the last 2 weeks, and per chart review, she was here this morning after a fall. She has bruising to her right eye from a fall that happened a couple weeks ago and staples in her scalp from a fall that happened on 11/1. She has a C-collar on here. The patient presents here tonight because she had a fall in her kitchen following an episode of dizziness and weakness. She is having neck pain here. No syncope. She lives in an assisted living facility and uses a walker for gait.     Review of Systems   Musculoskeletal: Positive for neck pain.   Neurological: Negative for syncope.   All other systems reviewed and are negative.      Allergies:  Lisinopril    Medications:  Seroquel  Sinemet   Cholecalciferol  Glycopyrrolate  Omeprazole  Famotidine    Past Medical History:     Psychotic disorder  Basal cell carcinoma  Parkinson's  Hypertension  Enterocolitis due to C. Difficile  Impaired mobility  Closed right femoral fracture  Osteoporosis  Mohs defect of cheek  Cervical spondylosis with myelopathy  Kidney stones  Adhesive capsulitis of right shoulder  Non allopathic lesion of sacroiliac region  Shoulder impingement syndrome  Degenerative joint disease  Osteoarthrosis  Arthropathy   Multiple falls    Past Surgical History:    Umbilical hernia repair  Ligate fallopian tubes  Face lift  Shoulder surgery  Hip surgery  Fusion anterior approach cervical spine with corpectomy     Family History:    Father- esophageal cancer, hypertension   Mother- hypertension, osteoporosis, stroke  Sister- breast cancer    Social History:  The patient presents to the ED via EMS  Living situation: Assisted Living facility   PCP: Uvaldo Sterling MD    Physical Exam     Patient  Vitals for the past 24 hrs:   BP Temp Pulse Resp SpO2 Weight   11/05/22 2302 -- -- -- -- 93 % --   11/05/22 2301 -- -- -- -- 97 % --   11/05/22 2300 (!) 166/75 -- 77 -- -- --   11/05/22 2251 -- -- -- -- 95 % --   11/05/22 2250 -- -- -- -- 95 % --   11/05/22 2249 -- -- -- -- 94 % --   11/05/22 2248 -- -- -- -- 95 % --   11/05/22 2247 -- -- -- -- 95 % --   11/05/22 2246 -- -- -- -- 94 % --   11/05/22 2245 (!) 168/75 -- 79 -- 94 % --   11/05/22 2225 -- -- -- -- 96 % --   11/05/22 2224 -- -- -- -- 95 % --   11/05/22 2223 -- -- -- -- 96 % --   11/05/22 2216 -- -- -- -- 96 % --   11/05/22 2211 -- -- 74 -- 96 % --   11/05/22 2206 (!) 161/81 -- -- -- -- --   11/05/22 2126 (!) 146/72 100.1  F (37.8  C) 81 18 95 % 51.7 kg (114 lb)       Physical Exam  GEN- alert, cooperative  HEENT- L occipital superficial laceration not involving the galea. R parietal scalp wound with staples from earlier today, PERRL, EOMI, MMM, oral pharynx without abnormalities, no dental injuries, midface stable, TM's clear bilaterally  NECK- ROM, soft, supple, upper midline C spine tenderness to palpation, no abrasions  RESP- CTAB, no w/r/r, chest wall nontender, no crepitus, symmetrical chest wall movement  CV- RRR, no m/r/g  ABD- soft, NT/ND, +BS  MSK- normal ROM in all extremities, pelvis stable to AP and lateral compression, no T and L spinal tenderness in the midline, 5/5 strength in all extremities  NEURO- GCS 15, speech normal, alert  SKIN- no rash, old R periorbital bruising  PSYCH- normal mood, normal behavior      Emergency Department Course   ECG  ECG results from 11/05/22   EKG 12-lead, tracing only     Value    Systolic Blood Pressure     Diastolic Blood Pressure     Ventricular Rate 81    Atrial Rate 81    TN Interval 138    QRS Duration 84        QTc 448    P Axis 71    R AXIS 27    T Axis 37    Interpretation ECG      Sinus rhythm  Normal ECG  No previous ECGs available         Imaging:  CT Cervical Spine w/o Contrast   Final  Result   IMPRESSION:   1.  No fracture or posttraumatic subluxation.         CT Head w/o Contrast   Final Result   IMPRESSION:   1.  No acute intracranial process.        Report per radiology    Laboratory:  Labs Ordered and Resulted from Time of ED Arrival to Time of ED Departure   BASIC METABOLIC PANEL - Abnormal       Result Value    Sodium 138      Potassium 3.6      Chloride 102      Carbon Dioxide (CO2) 27      Anion Gap 9      Urea Nitrogen 12.8      Creatinine 0.47 (*)     Calcium 9.4      Glucose 119 (*)     GFR Estimate >90     ROUTINE UA WITH MICROSCOPIC REFLEX TO CULTURE - Abnormal    Color Urine Light Yellow      Appearance Urine Clear      Glucose Urine Negative      Bilirubin Urine Negative      Ketones Urine 10 (*)     Specific Gravity Urine 1.014      Blood Urine Small (*)     pH Urine 6.5      Protein Albumin Urine 20 (*)     Urobilinogen Urine Normal      Nitrite Urine Negative      Leukocyte Esterase Urine Negative      RBC Urine 5 (*)     WBC Urine 3     CBC WITH PLATELETS AND DIFFERENTIAL - Abnormal    WBC Count 10.9      RBC Count 3.44 (*)     Hemoglobin 11.7      Hematocrit 36.8       (*)     MCH 34.0 (*)     MCHC 31.8      RDW 12.3      Platelet Count 274      % Neutrophils 83      % Lymphocytes 7      % Monocytes 10      % Eosinophils 0      % Basophils 0      % Immature Granulocytes 0      NRBCs per 100 WBC 0      Absolute Neutrophils 9.0 (*)     Absolute Lymphocytes 0.8      Absolute Monocytes 1.1      Absolute Eosinophils 0.0      Absolute Basophils 0.0      Absolute Immature Granulocytes 0.0      Absolute NRBCs 0.0     LACTIC ACID WHOLE BLOOD - Normal    Lactic Acid 0.8     INFLUENZA A/B & SARS-COV2 PCR MULTIPLEX - Normal    Influenza A PCR Negative      Influenza B PCR Negative      RSV PCR Negative      SARS CoV2 PCR Negative     BLOOD CULTURE   BLOOD CULTURE        Procedures:    Laceration Repair      Procedure: Laceration Repair    Indication: Laceration    Consent:  Verbal    Location:  Left posterior scalp    Length: 2.5 cm    Preparation: Irrigation with Sterile Saline.    Anesthesia/Sedation: Topical -LET      Treatment/Exploration: Wound explored, no foreign bodies found     Closure: The wound was closed with 2 staples.    Patient Status: The patient tolerated the procedure well: Yes. There were no complications.      Emergency Department Course:    Reviewed:  I reviewed nursing notes, vitals, past medical history and Care Everywhere    Assessments:  2129 I obtained history and examined the patient as noted above.   2317 I rechecked the patient.   2328 Laceration repair.    Consults:  0012 I spoke with Dr. Mon from the hospitalist service regarding the patient's presentation and workup.     Interventions:  2200: NS 1L IV Bolus     Disposition:  The patient was admitted to the hospital under the care of Dr. Mon.     Impression & Plan     CMS Diagnoses: None    Medical Decision Making:  Patient presents today for evaluation of falls.  She was just seen this morning after falling at her assisted living.  She has staples in place and they remain in place.  She has a newer left-sided occipital scalp wound.  That was stapled after we did the trauma work-up.  She had a low-grade temp 100.1 when she arrived.  She did not have a temperature this morning.  She describes some very nonspecific generalized weakness that developed throughout the day.  It certainly could be related to her low-grade temperature.  Infectious work-up was undertaken.  Currently there are no other sources I can find.  She is stable but is still feeling weak.  She has a nonfocal finding.  Due to her 2 falls in the last 12 hours, I do not feel she is safe to be discharged at this point.  She likely needs higher level care and more monitoring.  She tells me she does use her walker and is still falling.  She requires admission for further monitoring.  She is admitted  observation  unit.    Diagnosis:    ICD-10-CM    1. Falls frequently  R29.6       2. Generalized weakness  R53.1       3. Laceration of scalp, initial encounter  S01.01XA              Scribe Disclosure:  I, Eleanor Valle, am serving as a scribe at 9:23 PM on 11/5/2022 to document services personally performed by Mina Isidro MD based on my observations and the provider's statements to me.          Mina Isidro MD  11/06/22 0031

## 2022-11-07 PROBLEM — R29.6 FALLS FREQUENTLY: Status: ACTIVE | Noted: 2022-11-07

## 2022-11-07 PROBLEM — R53.1 GENERALIZED WEAKNESS: Status: ACTIVE | Noted: 2022-11-07

## 2022-11-07 LAB
ANION GAP SERPL CALCULATED.3IONS-SCNC: 8 MMOL/L (ref 7–15)
ATRIAL RATE - MUSE: 81 BPM
ATRIAL RATE - MUSE: 82 BPM
BUN SERPL-MCNC: 7.4 MG/DL (ref 8–23)
CALCIUM SERPL-MCNC: 8.7 MG/DL (ref 8.8–10.2)
CHLORIDE SERPL-SCNC: 103 MMOL/L (ref 98–107)
CREAT SERPL-MCNC: 0.44 MG/DL (ref 0.51–0.95)
DEPRECATED HCO3 PLAS-SCNC: 28 MMOL/L (ref 22–29)
DIASTOLIC BLOOD PRESSURE - MUSE: NORMAL MMHG
DIASTOLIC BLOOD PRESSURE - MUSE: NORMAL MMHG
ERYTHROCYTE [DISTWIDTH] IN BLOOD BY AUTOMATED COUNT: 12.7 % (ref 10–15)
GFR SERPL CREATININE-BSD FRML MDRD: >90 ML/MIN/1.73M2
GLUCOSE SERPL-MCNC: 109 MG/DL (ref 70–99)
HCT VFR BLD AUTO: 35.5 % (ref 35–47)
HGB BLD-MCNC: 11.4 G/DL (ref 11.7–15.7)
INTERPRETATION ECG - MUSE: NORMAL
INTERPRETATION ECG - MUSE: NORMAL
MAGNESIUM SERPL-MCNC: 1.6 MG/DL (ref 1.7–2.3)
MCH RBC QN AUTO: 34.2 PG (ref 26.5–33)
MCHC RBC AUTO-ENTMCNC: 32.1 G/DL (ref 31.5–36.5)
MCV RBC AUTO: 107 FL (ref 78–100)
P AXIS - MUSE: 67 DEGREES
P AXIS - MUSE: 71 DEGREES
PLATELET # BLD AUTO: 304 10E3/UL (ref 150–450)
POTASSIUM SERPL-SCNC: 3.3 MMOL/L (ref 3.4–5.3)
POTASSIUM SERPL-SCNC: 4.5 MMOL/L (ref 3.4–5.3)
PR INTERVAL - MUSE: 138 MS
PR INTERVAL - MUSE: 144 MS
PROCALCITONIN SERPL IA-MCNC: 0.17 NG/ML
QRS DURATION - MUSE: 82 MS
QRS DURATION - MUSE: 84 MS
QT - MUSE: 370 MS
QT - MUSE: 386 MS
QTC - MUSE: 432 MS
QTC - MUSE: 448 MS
R AXIS - MUSE: 22 DEGREES
R AXIS - MUSE: 27 DEGREES
RBC # BLD AUTO: 3.33 10E6/UL (ref 3.8–5.2)
SODIUM SERPL-SCNC: 139 MMOL/L (ref 136–145)
SYSTOLIC BLOOD PRESSURE - MUSE: NORMAL MMHG
SYSTOLIC BLOOD PRESSURE - MUSE: NORMAL MMHG
T AXIS - MUSE: 35 DEGREES
T AXIS - MUSE: 37 DEGREES
VANCOMYCIN SERPL-MCNC: 8.4 UG/ML
VENTRICULAR RATE- MUSE: 81 BPM
VENTRICULAR RATE- MUSE: 82 BPM
WBC # BLD AUTO: 6.7 10E3/UL (ref 4–11)

## 2022-11-07 PROCEDURE — 250N000013 HC RX MED GY IP 250 OP 250 PS 637: Performed by: HOSPITALIST

## 2022-11-07 PROCEDURE — 87040 BLOOD CULTURE FOR BACTERIA: CPT | Performed by: HOSPITALIST

## 2022-11-07 PROCEDURE — 250N000011 HC RX IP 250 OP 636: Performed by: PHYSICIAN ASSISTANT

## 2022-11-07 PROCEDURE — 250N000011 HC RX IP 250 OP 636: Performed by: HOSPITALIST

## 2022-11-07 PROCEDURE — 80048 BASIC METABOLIC PNL TOTAL CA: CPT | Performed by: PHYSICIAN ASSISTANT

## 2022-11-07 PROCEDURE — 250N000011 HC RX IP 250 OP 636: Performed by: EMERGENCY MEDICINE

## 2022-11-07 PROCEDURE — 84145 PROCALCITONIN (PCT): CPT | Performed by: PHYSICIAN ASSISTANT

## 2022-11-07 PROCEDURE — 96376 TX/PRO/DX INJ SAME DRUG ADON: CPT

## 2022-11-07 PROCEDURE — 83735 ASSAY OF MAGNESIUM: CPT | Performed by: PHYSICIAN ASSISTANT

## 2022-11-07 PROCEDURE — G0378 HOSPITAL OBSERVATION PER HR: HCPCS

## 2022-11-07 PROCEDURE — 85027 COMPLETE CBC AUTOMATED: CPT | Performed by: PHYSICIAN ASSISTANT

## 2022-11-07 PROCEDURE — 120N000001 HC R&B MED SURG/OB

## 2022-11-07 PROCEDURE — 99232 SBSQ HOSP IP/OBS MODERATE 35: CPT | Performed by: PHYSICIAN ASSISTANT

## 2022-11-07 PROCEDURE — 36415 COLL VENOUS BLD VENIPUNCTURE: CPT | Performed by: PHYSICIAN ASSISTANT

## 2022-11-07 PROCEDURE — 84132 ASSAY OF SERUM POTASSIUM: CPT | Performed by: PHYSICIAN ASSISTANT

## 2022-11-07 PROCEDURE — 36415 COLL VENOUS BLD VENIPUNCTURE: CPT | Performed by: EMERGENCY MEDICINE

## 2022-11-07 PROCEDURE — 80202 ASSAY OF VANCOMYCIN: CPT | Performed by: EMERGENCY MEDICINE

## 2022-11-07 PROCEDURE — 250N000013 HC RX MED GY IP 250 OP 250 PS 637: Performed by: PHYSICIAN ASSISTANT

## 2022-11-07 RX ORDER — POTASSIUM CHLORIDE 20MEQ/15ML
40 LIQUID (ML) ORAL ONCE
Status: COMPLETED | OUTPATIENT
Start: 2022-11-07 | End: 2022-11-07

## 2022-11-07 RX ORDER — ACETAMINOPHEN 500 MG
1000 TABLET ORAL 3 TIMES DAILY
Status: DISCONTINUED | OUTPATIENT
Start: 2022-11-07 | End: 2022-11-12 | Stop reason: HOSPADM

## 2022-11-07 RX ADMIN — CARBIDOPA AND LEVODOPA 1 HALF-TAB: 25; 100 TABLET ORAL at 08:46

## 2022-11-07 RX ADMIN — VANCOMYCIN HYDROCHLORIDE 1000 MG: 1 INJECTION, SOLUTION INTRAVENOUS at 21:12

## 2022-11-07 RX ADMIN — CEFEPIME HYDROCHLORIDE 2 G: 2 INJECTION, POWDER, FOR SOLUTION INTRAVENOUS at 20:38

## 2022-11-07 RX ADMIN — CARBIDOPA AND LEVODOPA 2 TABLET: 25; 100 TABLET ORAL at 12:21

## 2022-11-07 RX ADMIN — CARBIDOPA AND LEVODOPA 2 TABLET: 25; 100 TABLET, EXTENDED RELEASE ORAL at 00:10

## 2022-11-07 RX ADMIN — METHOCARBAMOL 250 MG: 500 TABLET ORAL at 13:24

## 2022-11-07 RX ADMIN — FAMOTIDINE 20 MG: 20 TABLET ORAL at 08:47

## 2022-11-07 RX ADMIN — CARBIDOPA AND LEVODOPA 1 TABLET: 25; 100 TABLET ORAL at 16:17

## 2022-11-07 RX ADMIN — CARBIDOPA AND LEVODOPA 2 TABLET: 25; 100 TABLET, EXTENDED RELEASE ORAL at 22:31

## 2022-11-07 RX ADMIN — CARBIDOPA AND LEVODOPA 2 TABLET: 25; 100 TABLET ORAL at 20:38

## 2022-11-07 RX ADMIN — VANCOMYCIN HYDROCHLORIDE 1000 MG: 1 INJECTION, SOLUTION INTRAVENOUS at 09:02

## 2022-11-07 RX ADMIN — ACETAMINOPHEN 650 MG: 325 TABLET, FILM COATED ORAL at 04:35

## 2022-11-07 RX ADMIN — POTASSIUM CHLORIDE 40 MEQ: 20 SOLUTION ORAL at 09:01

## 2022-11-07 RX ADMIN — ACETAMINOPHEN 1000 MG: 500 TABLET, FILM COATED ORAL at 18:28

## 2022-11-07 RX ADMIN — CARBIDOPA AND LEVODOPA 1 TABLET: 25; 100 TABLET ORAL at 08:46

## 2022-11-07 RX ADMIN — CEFTRIAXONE 1 G: 1 INJECTION, POWDER, FOR SOLUTION INTRAMUSCULAR; INTRAVENOUS at 00:10

## 2022-11-07 RX ADMIN — CARBIDOPA AND LEVODOPA 1 HALF-TAB: 25; 100 TABLET ORAL at 16:17

## 2022-11-07 RX ADMIN — GLYCOPYRROLATE 1 MG: 1 TABLET ORAL at 08:47

## 2022-11-07 RX ADMIN — ACETAMINOPHEN 650 MG: 325 TABLET, FILM COATED ORAL at 12:21

## 2022-11-07 RX ADMIN — CEFEPIME HYDROCHLORIDE 2 G: 2 INJECTION, POWDER, FOR SOLUTION INTRAVENOUS at 13:23

## 2022-11-07 ASSESSMENT — ACTIVITIES OF DAILY LIVING (ADL)
ADLS_ACUITY_SCORE: 44
ADLS_ACUITY_SCORE: 45
ADLS_ACUITY_SCORE: 45
ADLS_ACUITY_SCORE: 44
ADLS_ACUITY_SCORE: 45
ADLS_ACUITY_SCORE: 44
ADLS_ACUITY_SCORE: 45
ADLS_ACUITY_SCORE: 44

## 2022-11-07 NOTE — PHARMACY-ADMISSION MEDICATION HISTORY
Pharmacy Vancomycin Initial Note  Date of Service 2022  Patient's  1945  77 year old, female    Indication: Bacteremia    Current estimated CrCl = Estimated Creatinine Clearance: 83.1 mL/min (A) (based on SCr of 0.47 mg/dL (L)).    Creatinine for last 3 days  2022: 11:45 AM Creatinine 0.51 mg/dL;  9:54 PM Creatinine 0.47 mg/dL    Recent Vancomycin Level(s) for last 3 days  No results found for requested labs within last 72 hours.      Vancomycin IV Administrations (past 72 hours)      No vancomycin orders with administrations in past 72 hours.                Nephrotoxins and other renal medications (From now, onward)    Start     Dose/Rate Route Frequency Ordered Stop    22  vancomycin (VANCOCIN) 1000 mg in dextrose 5% 200 mL PREMIX         1,000 mg  200 mL/hr over 1 Hours Intravenous EVERY 12 HOURS 22            Contrast Orders - past 72 hours (72h ago, onward)    None          [employer] Prediction of Planned Initial Vancomycin Regimen  Loading dose: N/A  Regimen: 1000 mg IV every 12 hours.  Start time: 19:37 on 2022  Exposure target: AUC24 (range)400-600 mg/L.hr   AUC24,ss: 582 mg/L.hr  Probability of AUC24 > 400: 85 %  Ctrough,ss: 17.4 mg/L  Probability of Ctrough,ss > 20: 38 %  Probability of nephrotoxicity (Lodise KAI ): 13 %          Plan:  1. Start vancomycin  1000 mg IV q12h.   2. Vancomycin monitoring method: AUC  3. Vancomycin therapeutic monitoring goal: 400-600 mg*h/L  4. Pharmacy will check vancomycin levels as appropriate in 1-3 Days.    5. Serum creatinine levels will be ordered daily for the first week of therapy and at least twice weekly for subsequent weeks.      Adrien Rodriguez [facility]

## 2022-11-07 NOTE — PLAN OF CARE
"PRIMARY DIAGNOSIS: GENERALIZED WEAKNESS    OUTPATIENT/OBSERVATION GOALS TO BE MET BEFORE DISCHARGE  BP (!) 152/85 (BP Location: Right arm)   Pulse 77   Temp 98.1  F (36.7  C) (Oral)   Resp 16   Ht 1.549 m (5' 0.98\")   Wt 51.7 kg (114 lb)   SpO2 92%   BMI 21.55 kg/m    1. Orthostatic performed: No    2. Tolerating PO medications: Yes    3. Return to near baseline physical activity: Yes    4. Cleared for discharge by consultants (if involved): No    Discharge Planner Nurse   Safe discharge environment identified: Yes  Barriers to discharge: Yes       Entered by: Deidre Larson RN    Pt is A&O x 4, with intermittent confusion. Assist of 1-2. Pure wick in place. Tele: ST 's. Scheduled ceftriaxone and vanco. Will continue to monitor.  Please review provider order for any additional goals.   Nurse to notify provider when observation goals have been met and patient is ready for discharge.    "

## 2022-11-07 NOTE — PROGRESS NOTES
[facility]  Internal Medicine  Progress Note    Date of Service: 11/7/2022    Patient: Pablo Baez  MRN: [id number]  Admission Date: 11/5/2022      Assessment & Plan: Pablo Baez is a 77 year old female w/PMH parkinsons disease, UTI, BPPV, cognitive impairment who presents from her group home with recurrent falls.      Pseudomonas UTI  Gram Positive Bacteremia - pt presented to the ED with recurrent falls an noted to have a temp of 100.1.  Wbc on 11/5 13.9 then improved on 10.9 on repeat ED evaluation. Influeza/RSV/COVID negative.  CXR negative.  UA with no clear sign of infection on admission, but strarted on Ceftriaxone empirically.   - urine culture now growing Pseudomonas Aeruginosa  - will discontinue Ceftriaxone and start IV Cefepime  - 2/2 blood cultures now growing gram positive cocci  - started on Vancomycin on 11/6; continue  - repeat blood cultures daily  - will have ID consult      Recurrent Falls  Close Head Injury  Hx Parkinson's Disease with Gait Instability - pt presents from group home with frequent falls.  Likely due to acute infection.  Telemetry unremarkable.  Orthostatics negative, but checked after IVF hydration.    Seen in the ED on 11/1 with laceration repair to the right scalp with staples and right eyebrow repair with sutures.  CT head with scalp hematomas and c/s was negative and discharge back to group home.    Seen in the ED 11/5 am after a mechanical fall with laceration to her left parietal scalp with 2 staples placed and discharge back to her [facility].  CT head and C/S with no acute abnormality.  Patient returned to the ED again on 11/5 later in the day at her group home with another fall with generalized weakness and found to have a low grade fever of 100.1.  A Laceration of the left posterior scalp was repaired with staples.  - PT consult, will see on 11/8  - SW consult      Parkinson's Disease  Cognitive Impairment - parkinson's disease diagnosed 10/2018.  Followed by Neurology through  "[facility].  Has baseline significant gait instability, orofacial dyskinesia and hallucinations.  SLUMS score 1/2022 was 21/30.  - continue pta Sinemet, Robinul and prn Seroquel.      Troponin and CK elevation - -minimally elevated, likely related to falls.  Trop flat.  No chest pain.  CK now normal after IVF hydration.     Hx Chronic Neck Pain - h/o cervical spondylosis s/p cervical fusion.  CT c/s with no acute abnormalities.    - resume pta scheduled Tylenol and prn Robaxin    Hx Cdiff - hx of cdiff in 2021.  No recent diarrhea.  Will discuss starting prophylactic po Vancomycin with ID today.        CODE: full  DVT: scd  GI: pepcid  Diet/fluids: regular      Deborah FOSTERC  Hospitalist Physician Assistant  [facility]      Subjective & Interval Hx:    Patient reports she feels improved overall.  She has neck pain and upper back pain today.  She has limited ROM with her shoulders at baseline.  Denies any chest pain, shortness of breath, abdominal pain.  Tolerating po.  Denies any hallucinations.     Last 24 hr care team notes reviewed.   ROS:  4 point ROS including Respiratory, CV, GI and , other than that noted in the HPI, is negative.    Physical Exam:    Blood pressure (!) 158/88, pulse 76, temperature 98.9  F (37.2  C), temperature source Oral, resp. rate 16, height 1.549 m (5' 0.98\"), weight 51.7 kg (114 lb), SpO2 93 %.  General: Alert, interactive, NAD  HEENT: sclera anicteric, PERRL, right eye ecchymosis, sutures in place above right eyebrow, Staples to head x3 areas from recent falls  Resp: clear to auscultation bilaterally, no crackles or wheezes  Cardiac: regular rate and rhythm, no murmur  Abdomen: Soft, nontender, nondistended. +BS.  No HSM or masses, no rebound or guarding.  Extremities: No LE edema  Skin: Warm and dry  Neuro: Alert & oriented x 3, moves all extremities equally    Labs & Images:  Reviewed in Epic   Medications:    Current Facility-Administered Medications   Medication "     acetaminophen (TYLENOL) tablet 650 mg    Or     acetaminophen (TYLENOL) Suppository 650 mg     carbidopa-levodopa (SINEMET CR)  MG per CR tablet 2 tablet     carbidopa-levodopa (SINEMET)  MG per tablet 1 tablet    And     carbidopa-levodopa half-tab 12.5-50 mg     carbidopa-levodopa (SINEMET)  MG per tablet 2 tablet     cefTRIAXone (ROCEPHIN) 1 g vial to attach to  mL bag for ADULTS or NS 50 mL bag for PEDS     famotidine (PEPCID) tablet 20 mg     glycopyrrolate (ROBINUL) tablet 1 mg     glycopyrrolate (ROBINUL) tablet 1 mg     melatonin tablet 1 mg     methocarbamol (ROBAXIN) half-tab 250 mg     ondansetron (ZOFRAN ODT) ODT tab 4 mg    Or     ondansetron (ZOFRAN) injection 4 mg     QUEtiapine (SEROquel) half-tab 12.5 mg     senna-docusate (SENOKOT-S/PERICOLACE) 8.6-50 MG per tablet 1 tablet     vancomycin (VANCOCIN) 1000 mg in dextrose 5% 200 mL PREMIX

## 2022-11-07 NOTE — PLAN OF CARE
Lab just called to report Gram Positive Cocci. Provider updated via page. Pt is on Vancco currently.     Provider called back and updated this RN another lab draw to be done in the morning.

## 2022-11-07 NOTE — PLAN OF CARE
PRIMARY DIAGNOSIS: GENERALIZED WEAKNESS    OUTPATIENT/OBSERVATION GOALS TO BE MET BEFORE DISCHARGE  1. Orthostatic performed: No    2. Tolerating PO medications: Yes    3. Return to near baseline physical activity: Yes    4. Cleared for discharge by consultants (if involved): No    Discharge Planner Nurse   Safe discharge environment identified: Yes  Barriers to discharge: Yes       Entered by: Deidre Larson RN    Pt is A&O x 4, with intermittent confusion. Assist of 1-2. Pure wick in place. Tele: ST 's. Scheduled ceftriaxone and vanco. Will continue to monitor.  Please review provider order for any additional goals.   Nurse to notify provider when observation goals have been met and patient is ready for discharge.

## 2022-11-07 NOTE — PROGRESS NOTES
Paged re positive blood cultures. Gram positive cocci.  On ceftriaxone and treating for possible UTI.  Will add vanco. Repeat Blood cultures. Follow speciation.     Prashant Sharpe MD

## 2022-11-07 NOTE — PLAN OF CARE
Vitals: /70  Pulse 85  Temp 97.9  F  Resp 16  SpO2 95%     Shift Summary: Alert and oriented but forgetful with illogical speech at times. Patient reported pain in shoulders/arms/upper back; up to 10/10 at times. Tylenol & robaxin given with significant improvement in pain. Up in chair for breakfast. Ambulated halls this afternoon with Ax1, GB, walker. Antibiotics changed to Cefepime/Vanco. UC positive. BC positive. K+ 3.3, replaced, 4.5. Tele discontinued, was ST w/ PACs. Staples/sutures intact.     Plan: Continue IV Abx. ID to see. New BC pending. Monitor for fevers. Continue pain control with tylenol/robaxin. K+ and Mag rechecks in AM. PT to see tomorrow.

## 2022-11-07 NOTE — PLAN OF CARE
"Vitals:/70 (BP Location: Right arm)   Pulse 85   Temp 97.9  F (36.6  C) (Oral)   Resp 16   Ht 1.549 m (5' 0.98\")   Wt 51.7 kg (114 lb)   SpO2 95%   BMI 21.55 kg/m    Labs:Procacl improved from 0.27 to 0.17, urine culture positive for pseudomonas, blood culture positive for streptococcus mitis   Cardiac:WDL  Resp:WDL  Neuro: alert and oriented x4, mild cog impairment, auditory hallucinations at times, forgetful at times   GI:WDL  :WDL, purewick when in bed  Skin:Laceration above right eye -sutures intact, staples posterior scalp  Activity:Up with assist of 1 and walker  Diet:Reg  Pain:bilateral shoulders - scheduled tylenol and PRN robaxin   Plan:ID consulted, cefepime, vancomycin, PT/SW    "

## 2022-11-07 NOTE — PLAN OF CARE
PRIMARY DIAGNOSIS: Fall  OUTPATIENT/OBSERVATION GOALS TO BE MET BEFORE DISCHARGE:  1. Pain Status: Improved-controlled with oral pain medications.    2. Return to near baseline physical activity: No    3. Cleared for discharge by consultants (if involved): No    Discharge Planner Nurse   Safe discharge environment identified: No  Barriers to discharge: Yes       Entered by: Jennifer Mcclain RN 11/06/2022 9:11 PM     Please review provider order for any additional goals.   Nurse to notify provider when observation goals have been met and patient is ready for discharge.

## 2022-11-07 NOTE — PROGRESS NOTES
Care Management Follow Up    Length of Stay (days): 0    Expected Discharge Date: 11/09/2022     Concerns to be Addressed: care coordination/care conferences, discharge planning     Patient plan of care discussed at interdisciplinary rounds: Yes    Anticipated Discharge Disposition:  TBD     Additional Information:  Care management following for plan of care and discharge needs.   Left VM with [facility] [phone]. Awaiting call back to verify services and update on plan of care.  Received a voice message from [facility], [phone], stating that patient is not receiving home care services currently and confirmed that [facility] provides primary care, Dr. Lizzette Cortés.       Apryl Arboleda, RN      Apryl Arboleda RN Case Manager  Inpatient Care Coordination  [facility]   [phone]

## 2022-11-08 ENCOUNTER — APPOINTMENT (OUTPATIENT)
Dept: PHYSICAL THERAPY | Facility: CLINIC | Age: 77
DRG: 289 | End: 2022-11-08
Attending: HOSPITALIST
Payer: MEDICARE

## 2022-11-08 LAB
ANION GAP SERPL CALCULATED.3IONS-SCNC: 8 MMOL/L (ref 7–15)
BACTERIA BLD CULT: ABNORMAL
BACTERIA UR CULT: ABNORMAL
BUN SERPL-MCNC: 10.4 MG/DL (ref 8–23)
CALCIUM SERPL-MCNC: 9.5 MG/DL (ref 8.8–10.2)
CHLORIDE SERPL-SCNC: 103 MMOL/L (ref 98–107)
CREAT SERPL-MCNC: 0.53 MG/DL (ref 0.51–0.95)
DEPRECATED HCO3 PLAS-SCNC: 30 MMOL/L (ref 22–29)
ERYTHROCYTE [DISTWIDTH] IN BLOOD BY AUTOMATED COUNT: 12.5 % (ref 10–15)
GFR SERPL CREATININE-BSD FRML MDRD: >90 ML/MIN/1.73M2
GLUCOSE SERPL-MCNC: 110 MG/DL (ref 70–99)
HCT VFR BLD AUTO: 39.6 % (ref 35–47)
HGB BLD-MCNC: 12.5 G/DL (ref 11.7–15.7)
MAGNESIUM SERPL-MCNC: 1.7 MG/DL (ref 1.7–2.3)
MCH RBC QN AUTO: 33.8 PG (ref 26.5–33)
MCHC RBC AUTO-ENTMCNC: 31.6 G/DL (ref 31.5–36.5)
MCV RBC AUTO: 107 FL (ref 78–100)
PLATELET # BLD AUTO: 339 10E3/UL (ref 150–450)
POTASSIUM SERPL-SCNC: 4 MMOL/L (ref 3.4–5.3)
RBC # BLD AUTO: 3.7 10E6/UL (ref 3.8–5.2)
SODIUM SERPL-SCNC: 141 MMOL/L (ref 136–145)
WBC # BLD AUTO: 8.2 10E3/UL (ref 4–11)

## 2022-11-08 PROCEDURE — 250N000013 HC RX MED GY IP 250 OP 250 PS 637: Performed by: HOSPITALIST

## 2022-11-08 PROCEDURE — 36415 COLL VENOUS BLD VENIPUNCTURE: CPT | Performed by: PHYSICIAN ASSISTANT

## 2022-11-08 PROCEDURE — 250N000011 HC RX IP 250 OP 636: Performed by: PHYSICIAN ASSISTANT

## 2022-11-08 PROCEDURE — 97530 THERAPEUTIC ACTIVITIES: CPT | Mod: GP

## 2022-11-08 PROCEDURE — 80048 BASIC METABOLIC PNL TOTAL CA: CPT | Performed by: PHYSICIAN ASSISTANT

## 2022-11-08 PROCEDURE — 120N000001 HC R&B MED SURG/OB

## 2022-11-08 PROCEDURE — 250N000011 HC RX IP 250 OP 636: Performed by: EMERGENCY MEDICINE

## 2022-11-08 PROCEDURE — 99232 SBSQ HOSP IP/OBS MODERATE 35: CPT | Performed by: PHYSICIAN ASSISTANT

## 2022-11-08 PROCEDURE — 250N000013 HC RX MED GY IP 250 OP 250 PS 637: Performed by: PHYSICIAN ASSISTANT

## 2022-11-08 PROCEDURE — 87040 BLOOD CULTURE FOR BACTERIA: CPT | Performed by: PHYSICIAN ASSISTANT

## 2022-11-08 PROCEDURE — 97116 GAIT TRAINING THERAPY: CPT | Mod: GP

## 2022-11-08 PROCEDURE — 85027 COMPLETE CBC AUTOMATED: CPT | Performed by: PHYSICIAN ASSISTANT

## 2022-11-08 PROCEDURE — 83735 ASSAY OF MAGNESIUM: CPT | Performed by: PHYSICIAN ASSISTANT

## 2022-11-08 PROCEDURE — 97161 PT EVAL LOW COMPLEX 20 MIN: CPT | Mod: GP

## 2022-11-08 PROCEDURE — 99223 1ST HOSP IP/OBS HIGH 75: CPT | Performed by: INTERNAL MEDICINE

## 2022-11-08 RX ORDER — METHOCARBAMOL 500 MG/1
250 TABLET ORAL 3 TIMES DAILY PRN
Status: DISCONTINUED | OUTPATIENT
Start: 2022-11-08 | End: 2022-11-12 | Stop reason: HOSPADM

## 2022-11-08 RX ADMIN — GLYCOPYRROLATE 1 MG: 1 TABLET ORAL at 08:39

## 2022-11-08 RX ADMIN — ACETAMINOPHEN 1000 MG: 500 TABLET, FILM COATED ORAL at 17:43

## 2022-11-08 RX ADMIN — ACETAMINOPHEN 650 MG: 325 TABLET, FILM COATED ORAL at 21:10

## 2022-11-08 RX ADMIN — CARBIDOPA AND LEVODOPA 2 TABLET: 25; 100 TABLET ORAL at 12:57

## 2022-11-08 RX ADMIN — CEFEPIME HYDROCHLORIDE 2 G: 2 INJECTION, POWDER, FOR SOLUTION INTRAVENOUS at 05:45

## 2022-11-08 RX ADMIN — CARBIDOPA AND LEVODOPA 2 TABLET: 25; 100 TABLET ORAL at 20:47

## 2022-11-08 RX ADMIN — CARBIDOPA AND LEVODOPA 1 HALF-TAB: 25; 100 TABLET ORAL at 08:39

## 2022-11-08 RX ADMIN — VANCOMYCIN HYDROCHLORIDE 1000 MG: 1 INJECTION, SOLUTION INTRAVENOUS at 21:32

## 2022-11-08 RX ADMIN — FAMOTIDINE 20 MG: 20 TABLET ORAL at 08:39

## 2022-11-08 RX ADMIN — CARBIDOPA AND LEVODOPA 1 HALF-TAB: 25; 100 TABLET ORAL at 15:55

## 2022-11-08 RX ADMIN — CEFEPIME HYDROCHLORIDE 2 G: 2 INJECTION, POWDER, FOR SOLUTION INTRAVENOUS at 20:47

## 2022-11-08 RX ADMIN — CARBIDOPA AND LEVODOPA 2 TABLET: 25; 100 TABLET, EXTENDED RELEASE ORAL at 22:52

## 2022-11-08 RX ADMIN — VANCOMYCIN HYDROCHLORIDE 1000 MG: 1 INJECTION, SOLUTION INTRAVENOUS at 08:42

## 2022-11-08 RX ADMIN — CEFEPIME HYDROCHLORIDE 2 G: 2 INJECTION, POWDER, FOR SOLUTION INTRAVENOUS at 12:57

## 2022-11-08 RX ADMIN — ACETAMINOPHEN 1000 MG: 500 TABLET, FILM COATED ORAL at 05:45

## 2022-11-08 RX ADMIN — CARBIDOPA AND LEVODOPA 1 TABLET: 25; 100 TABLET ORAL at 15:55

## 2022-11-08 RX ADMIN — ACETAMINOPHEN 1000 MG: 500 TABLET, FILM COATED ORAL at 12:57

## 2022-11-08 RX ADMIN — CARBIDOPA AND LEVODOPA 1 TABLET: 25; 100 TABLET ORAL at 08:39

## 2022-11-08 ASSESSMENT — ACTIVITIES OF DAILY LIVING (ADL)
ADLS_ACUITY_SCORE: 45
ADLS_ACUITY_SCORE: 44
ADLS_ACUITY_SCORE: 48
ADLS_ACUITY_SCORE: 45
ADLS_ACUITY_SCORE: 48
ADLS_ACUITY_SCORE: 48
ADLS_ACUITY_SCORE: 46
ADLS_ACUITY_SCORE: 44
ADLS_ACUITY_SCORE: 45
ADLS_ACUITY_SCORE: 48
ADLS_ACUITY_SCORE: 44
ADLS_ACUITY_SCORE: 45

## 2022-11-08 NOTE — PLAN OF CARE
"Care from 0082-8335  BP (!) 155/76 (BP Location: Right arm)   Pulse 85   Temp 97.6  F (36.4  C) (Oral)   Resp 16   Ht 1.549 m (5' 0.98\")   Wt 51.7 kg (114 lb)   SpO2 95%   BMI 21.55 kg/m      Labs:urine culture positive for pseudomonas, blood culture positive for streptococcus mitis   Cardiac:WDL  Resp:WDL  Neuro:Alert and oriented but forgetful at times.   GI:WDL  :WDL purewick in place   Skin:Laceration above right eye -sutures intact, staples posterior scalp  Activity:A-1 walker/gait belt  Diet:Regular  Plan:ID consulted, IV abx, PT/SW  "

## 2022-11-08 NOTE — PHARMACY-VANCOMYCIN DOSING SERVICE
Pharmacy Vancomycin Note  Date of Service 2022  Patient's  1945   77 year old, female    Indication: Bacteremia  Day of Therapy: 2  Current vancomycin regimen:  1000 mg IV q12h  Current vancomycin monitoring method: AUC  Current vancomycin therapeutic monitoring goal: 400-600 mg*h/L    InsightRX Prediction of Current Vancomycin Regimen  Loading dose: N/A  Regimen: 1000 mg IV every 12 hours.  Start time: 23:20 on 2022  Exposure target: AUC24 (range)400-600 mg/L.hr   AUC24,ss: 489 mg/L.hr  Probability of AUC24 > 400: 80 %  Ctrough,ss: 13.5 mg/L  Probability of Ctrough,ss > 20: 13 %  Probability of nephrotoxicity (Lodise KAI ): 9 %      Current estimated CrCl = Estimated Creatinine Clearance: 88.8 mL/min (A) (based on SCr of 0.44 mg/dL (L)).    Creatinine for last 3 days  2022: 11:45 AM Creatinine 0.51 mg/dL;  9:54 PM Creatinine 0.47 mg/dL  2022:  5:41 AM Creatinine 0.44 mg/dL    Recent Vancomycin Levels (past 3 days)  2022:  6:04 PM Vancomycin 8.4 ug/mL    Vancomycin IV Administrations (past 72 hours)                   vancomycin (VANCOCIN) 1000 mg in dextrose 5% 200 mL PREMIX (mg) 1,000 mg New Bag 22     1,000 mg New Bag  0902     1,000 mg New Bag 22                Nephrotoxins and other renal medications (From now, onward)    Start     Dose/Rate Route Frequency Ordered Stop    22  vancomycin (VANCOCIN) 1000 mg in dextrose 5% 200 mL PREMIX         1,000 mg  200 mL/hr over 1 Hours Intravenous EVERY 12 HOURS 22 1933               Contrast Orders - past 72 hours (72h ago, onward)    None          Interpretation of levels and current regimen:  Vancomycin level is reflective of -600    Has serum creatinine changed greater than 50% in last 72 hours: No    Urine output:  good urine output    Renal Function: Stable    Plan:  1. Continue Current Dose  2. Vancomycin monitoring method: AUC  3. Vancomycin therapeutic monitoring goal:  400-600 mg*h/L  4. Pharmacy will check vancomycin levels as appropriate in 1-3 Days.  5. Serum creatinine levels will be ordered daily for the first week of therapy and at least twice weekly for subsequent weeks.    Billy Michelle RPH

## 2022-11-08 NOTE — CONSULTS
"NUTRITION ASSESSMENT      REASON FOR NUTRITION CONSULT:  Malnutrition risk screen total score of \"2\" with \"unsure\" answered to question of \"have you recently lost weight without trying?\".      ASSESSMENT:  Per review of available wt trends below, no wt loss and relatively consistent since 2021 as outlined:  Wt Readings from Last 10 Encounters:   11/05/22 51.7 kg (114 lb)   01/12/22 51.7 kg (114 lb)   09/22/21 52.9 kg (116 lb 9.6 oz)       FOLLOW UP:   Will follow at LOS per policy, unless formally consulted in the interim.        Haydee Montejo RDN, LD  Clinical Dietitian  3rd floor/ICU: [phone]  All other floors: [phone]  Weekend/holiday: [phone]  Office: [phone]  "

## 2022-11-08 NOTE — PROGRESS NOTES
11/08/22 0951   Appointment Info   Signing Clinician's Name / Credentials (PT) CYNDI Cody   Student Supervision On-site supervision provided;Direct supervision provided;Line of sight supervision provided;Direct Patient Contact Provided;Therapy services provided with the co-signing licensed therapist guiding and directing the services, and providing the skilled judgement and assessment throughout the session   Living Environment   People in Home alone   Current Living Arrangements assisted living   Home Accessibility no concerns   Self-Care   Usual Activity Tolerance moderate  (walks to 3 meals a day)   Current Activity Tolerance fair   Equipment Currently Used at Home walker, rolling;grab bar, tub/shower;grab bar, toilet   Fall history within last six months yes  (pt reports she falls often)   Number of times patient has fallen within last six months 10   Activity/Exercise/Self-Care Comment receives assist with all meals, cleaning, meds, bathing, dressing. Toilets indp. Has received home PT/OT previously.   General Information   Onset of Illness/Injury or Date of Surgery 11/05/22   Referring Physician Pee Mon,    Patient/Family Therapy Goals Statement (PT) return to penitentiary and get stronger; agreeable to TCU   Pertinent History of Current Problem (include personal factors and/or comorbidities that impact the POC) Pablo Baez is a 77 year old female w/PMH parkinsons disease, UTI, BPPV, cognitive impairment who [facility] her [facility]it[facility]iary with recurrent falls.   Existing Precautions/Restrictions fall   Cognition   Affect/Mental Status (Cognition) confused   Pain Assessment   Patient Currently in Pain Yes, see Vital Sign flowsheet   Posture    Posture Forward head position;Protracted shoulders;Kyphosis   Range of Motion (ROM)   Range of Motion ROM is WFL   Strength (Manual Muscle Testing)   Strength (Manual Muscle Testing) Deficits observed during functional mobility   Bed Mobility   Bed Mobility  scooting/bridging;sit-supine   Scooting/Bridging Chase (Bed Mobility) maximum assist (25% patient effort);set up;verbal cues;nonverbal cues (demo/gesture)   Sit-Supine Chase (Bed Mobility) minimum assist (75% patient effort);nonverbal cues (demo/gesture);verbal cues;set up   Impairments Contributing to Impaired Bed Mobility pain;decreased strength   Assistive Device (Bed Mobility) bed rails;draw sheet   Transfers   Transfers sit-stand transfer;toilet transfer   Impairments Contributing to Impaired Transfers impaired balance;pain;decreased strength   Sit-Stand Transfer   Sit-Stand Chase (Transfers) contact guard;minimum assist (75% patient effort);verbal cues;set up   Assistive Device (Sit-Stand Transfers) walker, standard   Toilet Transfer   Chase Level (Toilet Transfer) minimum assist (75% patient effort);verbal cues;set up;nonverbal cues (demo/gesture)   Assistive Device (Toilet Transfer) walker, standard;grab bars/safety frame   Type (Toilet Transfer) sit-stand;stand-sit   Gait/Stairs (Locomotion)   Chase Level (Gait) contact guard;nonverbal cues (demo/gesture);verbal cues;set up  (wheelchair follow)   Assistive Device (Gait) walker, standard   Distance in Feet (Required for LE Total Joints) 10+25   Distance in Feet (Gait) 10+25   Pattern (Gait) step-to   Deviations/Abnormal Patterns (Gait) yahaira decreased;gait speed decreased;stride length decreased   Balance   Balance Comments good seated balance, poor standing balance as seen by heavy UE reliance on walker, slow shuffling during turns, and hx of falls   Sensory Examination   Sensory Perception patient reports no sensory changes   Coordination   Coordination Comments pt requires multiple attempts to reach for grab bar, walker, and arm rests during transfers as well as when reaching for paper towel dispenser and sink handles in bathroom   Muscle Tone   Muscle Tone Comments patient reports muscle spasms in L shoulder and L  upper back   Clinical Impression   Criteria for Skilled Therapeutic Intervention Yes, treatment indicated   PT Diagnosis (PT) impaired functional mobility   Influenced by the following impairments pain, weakness, impaired balance, decreased activity tolerance   Functional limitations due to impairments impaired bed mob, transfers and amb, decreased indp with ADLs   Clinical Presentation (PT Evaluation Complexity) Stable/Uncomplicated   Clinical Presentation Rationale clinical judgement   Clinical Decision Making (Complexity) low complexity   Planned Therapy Interventions (PT) balance training;bed mobility training;gait training;patient/family education;strengthening;transfer training;progressive activity/exercise   Anticipated Equipment Needs at Discharge (PT)   (owns necessary equipment)   Risk & Benefits of therapy have been explained evaluation/treatment results reviewed;care plan/treatment goals reviewed;risks/benefits reviewed;participants voiced agreement with care plan;current/potential barriers reviewed;participants included;patient   PT Total Evaluation Time   PT Eval, Low Complexity Minutes (62438) 12   Physical Therapy Goals   PT Frequency Daily   PT Predicted Duration/Target Date for Goal Attainment 11/15/22   PT Goals Bed Mobility;Transfers;Gait   PT: Bed Mobility Modified independent;Supine to/from sit;Bridging   PT: Transfers Modified independent;Sit to/from stand;Bed to/from chair;Assistive device  (walker)   PT: Gait Supervision/stand-by assist;Assistive device;Standard walker;50 feet   Interventions   Interventions Quick Adds Therapeutic Activity;Gait Training   Therapeutic Activity   Therapeutic Activities: dynamic activities to improve functional performance Minutes (42984) 23   Symptoms Noted During/After Treatment Fatigue;Increased pain   Treatment Detail/Skilled Intervention Patient sitting in chair at time of PT arrival. Patient reports needing to use the bathroom and requires reminders that  "her purewick is not in place. She requires CGA-min A, VC, and FWW for STS transfers from chair level surfaces. Patient requires min A, grab bars, VC, and FWW for STS transfer to and from the lower toilet surface. She is able to demonstrate pericares indp when receiving min A assistance for balance at the gait belt. Patient requires max A for LE dressing d/t inability to stand without B UE support. She washes hands and face indp however requires VC to avoid rubbing her facial stitches. Patient requires min A and heavy VC and TC when demonstrating stand to sit transfer to wheelchair, is able to scoot back into chair indp with additional time required. Upon return to room, patient requires min A-max A to perform bed mobility including mod A to get legs into bed, min A to lay back into supine, and max A to scoot laterally and upwards in bed. Throughout the session, patient c/o muscle spasms in her LUE and L upper back, patient requests an ice pack. Bed alarm on, all needs met, and call light nearby.   Gait Training   Gait Training Minutes (31505) 8   Symptoms Noted During/After Treatment (Gait Training) fatigue   Treatment Detail/Skilled Intervention Patient amb 10ft in room to toilet with CGA d/t recent hx of falls. She demonstrates 0 LOB however has a decreased yahaira, decreased step length, and heavy UE reliance on walker. In room, she demonstrates poor obstacle navigation via walker running into bathroom door despite having enough room to get through the doorway. Patient declines opening R eye throughout session and c/o B eyes burning. She amb 25 ft in hallway, distance limited by fatigue, pain, and patient request to sit down. She states that this distance was \"exhausting\" however reports her walk to the cafeteria at home 3x/day for meals is longer than this.   Anson Level (Gait Training) contact guard   Physical Assistance Level (Gait Training) set-up required;verbal cues;nonverbal cues (demo/gestures);1 " person assist   PT Discharge Planning   PT Plan progress amb as tolerated, transfer training, bed mob training, balance training   PT Discharge Recommendation (DC Rec) Transitional Care Facility;home with assist;home with home care physical therapy   PT Rationale for DC Rec Patient presents significantly below baseline. She is currently requiring assist x1 for all transfers, bed mobility, toileting, and amb. Patient states she does not feel like she will be able to care for herself at her [facility]. Recommend d/c to TCU for cont PT for improving mobility, strength, and independence. If patient is unable to discharge to TCU, recommend d/c home to [facility] with increased services including AM/PM cares, Ax1 with transfers/gait, assist x1 with toileting/bathing, escort to meals, and home PT/OT.   PT Brief overview of current status FWW and Ax1 for bed mob, transfers, amb 25 ft, and toileting   Total Session Time   Timed Code Treatment Minutes 31   Total Session Time (sum of timed and untimed services) 43

## 2022-11-08 NOTE — PLAN OF CARE
Shift [id number]  Problem: Frequent falls     Alert and oriented x4 but forgetful. Patient states she is forgetting things often. Denies pain, reports chronic numbness/tingling on bilateral toes. Had a shower today. Up with 1 assist, gait belt, and walker. Abrasions to the right side of the face/eyebrow. Section of right arm outlined with pen, this area is red and warm. Left IV site saline locked. Patient appears very somnolent. Up in chair throughout shift.     Vital signs:  Temp: 97.9  F (36.6  C) Temp src: Oral BP: (!) 144/74 Pulse: 83   Resp: 20 SpO2: 96 % O2 Device: None (Room air)

## 2022-11-08 NOTE — PROGRESS NOTES
Care from [id number]:    Pt disoriented to time, vitally stable. C/o pain to headache and back of neck, denies any medication or interventions. Denies any auditory hallucinations. Assist x1 with walker. R scalp laceration intact. +BC and +UC, on IV cefepime and IV vanco. PT, SW and ID following. Tolerating regular diet. PT recommending TCU upon discharge. SW following for safe discharge.     Temp: 98  F (36.7  C) Temp src: Oral BP: 127/68 Pulse: 85   Resp: 20 SpO2: 95 % O2 Device: None (Room air)

## 2022-11-08 NOTE — PLAN OF CARE
"Pt alert and orientated but forgetful at times. Pt up 1 assist with walker and gait belt. Pt has purewick in place. Complains of minimal pain, managed with scheduled tylenol. Pt has laceration above right eye with sutures and staples. Pt slept most of the shift. Pt on IV vanco and cefepime. Pt saline locked in between. When going in to start the cefepime, writer noticed IV site red and warm with a bump, pt stated that area hurt - when flushing with NS, IV leaked and she stated it hurt more. Put new IV in other arm (left forearm), then took IV out from right AC and marked site. Will continue to monitor.      BP (!) 151/89 (BP Location: Left arm)   Pulse 76   Temp 98.2  F (36.8  C) (Oral)   Resp 18   Ht 1.549 m (5' 0.98\")   Wt 51.7 kg (114 lb)   SpO2 95%   BMI 21.55 kg/m      " Patient/Caregiver provided printed discharge information.

## 2022-11-08 NOTE — PROGRESS NOTES
[facility]  Internal Medicine  Progress Note    Date of Service: 11/8/2022    Patient: Pablo Baez  MRN: [id number]  Admission Date: 11/5/2022      Assessment & Plan: Pablo Baez is a 77 year old female w/PMH parkinsons disease, UTI, BPPV, cognitive impairment who presents from her detention with recurrent falls.        Pseudomonas UTI  Streptococcus Oralis Bacteremia - pt presented to the ED with recurrent falls and noted to have a temp of 100.1.  Wbc on 11/5 13.9 then improved on 10.9 on repeat ED evaluation. Influeza/RSV/COVID negative.  CXR negative.  UA with no clear sign of infection on admission, but started on Ceftriaxone empirically.   - urine culture now growing Pseudomonas Aeruginosa, awaiting sensitivities  - discontinued Ceftriaxone on 11/7 and started IV Cefepime (day 2)  - 11/6 blood cultures 2/2 now growing streptococcus oralis, awaiting sensitivities  - started on Vancomycin on 11/6; continue  - repeat blood cultures daily.  11/7, 11/8 NGTD    - awaiting ID consult placed on 11/7.     Recurrent Falls  Close Head Injury  Hx Parkinson's Disease with Gait Instability - pt presents from [facility] with frequent falls.  Likely due to acute infection.  Telemetry unremarkable.  Orthostatics negative, but checked after IVF hydration.    Seen in the ED on 11/1 with laceration repair to the right scalp with staples and right eyebrow repair with sutures.  CT head with scalp hematomas and c/s was negative and discharge back to detention.    Seen in the ED 11/5 am after a mechanical fall with laceration to her left parietal scalp with 2 staples placed and discharge back to her [facility].  CT head and C/S with no acute abnormality.  Patient returned to the ED again on 11/5 later in the day at her [facility] with another fall with generalized weakness and found to have a low grade fever of 100.1.  A Laceration of the left posterior scalp was repaired with staples.  - PT consulted and recommend TCU  - SW consult for  "assistance with placement     Parkinson's Disease  Cognitive Impairment - parkinson's disease diagnosed 10/2018.  Followed by Neurology through [facility].  Has baseline significant gait instability, orofacial dyskinesia and hallucinations.  SLUMS score 1/2022 was 21/30.  - continue pta Sinemet, Robinul and prn Seroquel.      Troponin and CK elevation - minimally elevated, likely related to falls.  Trop flat.  No chest pain.  CK now normal after IVF hydration.     Hx Chronic Neck Pain - h/o cervical spondylosis s/p cervical fusion.  CT c/s with no acute abnormalities.    - continue pta scheduled Tylenol and increase pta prn Robaxin     Hx Cdiff - hx of cdiff in 2021.  No recent diarrhea.  Will discuss starting prophylactic po Vancomycin with ID .        CODE: full  DVT: scd  GI: pepcid  Diet/fluids: regular    Deborah FOSTERC  Hospitalist Physician Assistant  [facility]      Subjective & Interval Hx:    Patient feels improved since admission.  Still has some neck pain and upper back pain today.  She has limited ROM with her shoulders at baseline.  Denies any chest pain, shortness of breath, abdominal pain.  Tolerating po.  Denies any hallucinations.     Last 24 hr care team notes reviewed.   ROS:  4 point ROS including Respiratory, CV, GI and , other than that noted in the HPI, is negative.    Physical Exam:    Blood pressure 127/68, pulse 85, temperature 98  F (36.7  C), temperature source Oral, resp. rate 20, height 1.549 m (5' 0.98\"), weight 51.7 kg (114 lb), SpO2 95 %.  General: Alert, interactive, NAD  HEENT: sclera anicteric, PERRL, right eye ecchymosis, sutures in place above right eyebrow, Staples to head x3 areas from recent falls  Resp: clear to auscultation bilaterally, no crackles or wheezes  Cardiac: regular rate and rhythm, no murmur  Abdomen: Soft, nontender, nondistended. +BS.  No HSM or masses, no rebound or guarding.  Extremities: No LE edema  Skin: Warm and dry  Neuro: Alert & " oriented x 3, moves all extremities equally    Labs & Images:  Reviewed in Epic   Medications:    Current Facility-Administered Medications   Medication     acetaminophen (TYLENOL) tablet 650 mg    Or     acetaminophen (TYLENOL) Suppository 650 mg     acetaminophen (TYLENOL) tablet 1,000 mg     carbidopa-levodopa (SINEMET CR)  MG per CR tablet 2 tablet     carbidopa-levodopa (SINEMET)  MG per tablet 1 tablet    And     carbidopa-levodopa half-tab 12.5-50 mg     carbidopa-levodopa (SINEMET)  MG per tablet 2 tablet     ceFEPIme (MAXIPIME) 2 g vial to attach to  ml bag for ADULTS or 50 ml bag for PEDS     famotidine (PEPCID) tablet 20 mg     glycopyrrolate (ROBINUL) tablet 1 mg     glycopyrrolate (ROBINUL) tablet 1 mg     melatonin tablet 1 mg     methocarbamol (ROBAXIN) half-tab 250 mg     ondansetron (ZOFRAN ODT) ODT tab 4 mg    Or     ondansetron (ZOFRAN) injection 4 mg     QUEtiapine (SEROquel) half-tab 12.5 mg     senna-docusate (SENOKOT-S/PERICOLACE) 8.6-50 MG per tablet 1 tablet     vancomycin (VANCOCIN) 1000 mg in dextrose 5% 200 mL PREMIX

## 2022-11-08 NOTE — CONSULTS
ID consult dictated IMP 1 76 yo female recent vague sxs and falls, now LGF has high grADE STREP ORALIS BACTERMIA WO CLEAR CAUSE, this is likely higly significant and not contaminant    recv cfepime for now but doubt UC is of any significance  Needs TTE at least, this is likely a tue bacteremia and high concern endocarditis

## 2022-11-09 ENCOUNTER — APPOINTMENT (OUTPATIENT)
Dept: CARDIOLOGY | Facility: CLINIC | Age: 77
DRG: 289 | End: 2022-11-09
Attending: HOSPITALIST
Payer: MEDICARE

## 2022-11-09 ENCOUNTER — APPOINTMENT (OUTPATIENT)
Dept: PHYSICAL THERAPY | Facility: CLINIC | Age: 77
DRG: 289 | End: 2022-11-09
Attending: PHYSICIAN ASSISTANT
Payer: MEDICARE

## 2022-11-09 LAB
LVEF ECHO: NORMAL
MAGNESIUM SERPL-MCNC: 1.8 MG/DL (ref 1.7–2.3)
POTASSIUM SERPL-SCNC: 4.1 MMOL/L (ref 3.4–5.3)

## 2022-11-09 PROCEDURE — 83735 ASSAY OF MAGNESIUM: CPT | Performed by: PHYSICIAN ASSISTANT

## 2022-11-09 PROCEDURE — 36415 COLL VENOUS BLD VENIPUNCTURE: CPT | Performed by: PHYSICIAN ASSISTANT

## 2022-11-09 PROCEDURE — 97116 GAIT TRAINING THERAPY: CPT | Mod: GP | Performed by: PHYSICAL THERAPIST

## 2022-11-09 PROCEDURE — 93306 TTE W/DOPPLER COMPLETE: CPT | Mod: 26 | Performed by: INTERNAL MEDICINE

## 2022-11-09 PROCEDURE — 99233 SBSQ HOSP IP/OBS HIGH 50: CPT | Performed by: INTERNAL MEDICINE

## 2022-11-09 PROCEDURE — 93306 TTE W/DOPPLER COMPLETE: CPT

## 2022-11-09 PROCEDURE — 99232 SBSQ HOSP IP/OBS MODERATE 35: CPT | Performed by: PHYSICIAN ASSISTANT

## 2022-11-09 PROCEDURE — 97530 THERAPEUTIC ACTIVITIES: CPT | Mod: GP | Performed by: PHYSICAL THERAPIST

## 2022-11-09 PROCEDURE — 120N000001 HC R&B MED SURG/OB

## 2022-11-09 PROCEDURE — 250N000011 HC RX IP 250 OP 636: Performed by: EMERGENCY MEDICINE

## 2022-11-09 PROCEDURE — 250N000013 HC RX MED GY IP 250 OP 250 PS 637: Performed by: HOSPITALIST

## 2022-11-09 PROCEDURE — 84132 ASSAY OF SERUM POTASSIUM: CPT | Performed by: PHYSICIAN ASSISTANT

## 2022-11-09 PROCEDURE — 250N000011 HC RX IP 250 OP 636: Performed by: PHYSICIAN ASSISTANT

## 2022-11-09 PROCEDURE — 250N000013 HC RX MED GY IP 250 OP 250 PS 637: Performed by: PHYSICIAN ASSISTANT

## 2022-11-09 RX ADMIN — CARBIDOPA AND LEVODOPA 2 TABLET: 25; 100 TABLET ORAL at 13:34

## 2022-11-09 RX ADMIN — CARBIDOPA AND LEVODOPA 1 HALF-TAB: 25; 100 TABLET ORAL at 16:57

## 2022-11-09 RX ADMIN — ACETAMINOPHEN 1000 MG: 500 TABLET, FILM COATED ORAL at 19:22

## 2022-11-09 RX ADMIN — CARBIDOPA AND LEVODOPA 1 TABLET: 25; 100 TABLET ORAL at 09:03

## 2022-11-09 RX ADMIN — CARBIDOPA AND LEVODOPA 2 TABLET: 25; 100 TABLET ORAL at 21:17

## 2022-11-09 RX ADMIN — CEFEPIME HYDROCHLORIDE 2 G: 2 INJECTION, POWDER, FOR SOLUTION INTRAVENOUS at 04:39

## 2022-11-09 RX ADMIN — FAMOTIDINE 20 MG: 20 TABLET ORAL at 09:03

## 2022-11-09 RX ADMIN — CARBIDOPA AND LEVODOPA 1 HALF-TAB: 25; 100 TABLET ORAL at 09:03

## 2022-11-09 RX ADMIN — VANCOMYCIN HYDROCHLORIDE 1000 MG: 1 INJECTION, SOLUTION INTRAVENOUS at 21:18

## 2022-11-09 RX ADMIN — ACETAMINOPHEN 1000 MG: 500 TABLET, FILM COATED ORAL at 05:51

## 2022-11-09 RX ADMIN — CEFEPIME HYDROCHLORIDE 2 G: 2 INJECTION, POWDER, FOR SOLUTION INTRAVENOUS at 16:42

## 2022-11-09 RX ADMIN — CARBIDOPA AND LEVODOPA 2 TABLET: 25; 100 TABLET, EXTENDED RELEASE ORAL at 22:48

## 2022-11-09 RX ADMIN — CEFEPIME HYDROCHLORIDE 2 G: 2 INJECTION, POWDER, FOR SOLUTION INTRAVENOUS at 21:17

## 2022-11-09 RX ADMIN — CARBIDOPA AND LEVODOPA 1 TABLET: 25; 100 TABLET ORAL at 16:57

## 2022-11-09 RX ADMIN — GLYCOPYRROLATE 1 MG: 1 TABLET ORAL at 16:56

## 2022-11-09 RX ADMIN — VANCOMYCIN HYDROCHLORIDE 1000 MG: 1 INJECTION, SOLUTION INTRAVENOUS at 09:30

## 2022-11-09 RX ADMIN — ACETAMINOPHEN 1000 MG: 500 TABLET, FILM COATED ORAL at 13:35

## 2022-11-09 ASSESSMENT — ACTIVITIES OF DAILY LIVING (ADL)
ADLS_ACUITY_SCORE: 46
ADLS_ACUITY_SCORE: 45
ADLS_ACUITY_SCORE: 45
ADLS_ACUITY_SCORE: 46
ADLS_ACUITY_SCORE: 46
ADLS_ACUITY_SCORE: 45
ADLS_ACUITY_SCORE: 45
ADLS_ACUITY_SCORE: 46

## 2022-11-09 NOTE — PROGRESS NOTES
[facility]  Internal Medicine  Progress Note    Date of Service: 11/9/2022    Patient: Pablo Baez  MRN: [id number]  Admission Date: 11/5/2022      Assessment & Plan: Pablo Baez is a 77 year old female w/PMH parkinsons disease, UTI, BPPV, cognitive impairment who presents from her detention with recurrent falls.        Pseudomonas UTI  Streptococcus Oralis Bacteremia - pt presented to the ED with recurrent falls and noted to have a temp of 100.1.  Wbc on 11/5 13.9 then improved on 10.9 on repeat ED evaluation. Influeza/RSV/COVID negative.  CXR negative.  UA with no clear sign of infection on admission, but started on Ceftriaxone empirically.   - urine culture now growing Pseudomonas Aeruginosa  - discontinued Ceftriaxone on 11/7 and started IV Cefepime (day 3)  - 11/6 blood cultures 2/2 now growing streptococcus oralis  - started on Vancomycin on 11/6  - repeat blood cultures daily have been NGTD  - ID consulted 11/8 and recommended echocardiogram to evaluate for endocarditis  - echocardiogram 11/9 revealed Valves are thickened with no obvious vegetations.  - LEN ordered for 11/10  - await ID follow up today, possible to streamline abx to Meropenem as both blood and urine cx susceptible     Recurrent Falls  Close Head Injury  Hx Parkinson's Disease with Gait Instability - pt presents from detention with frequent falls.  Likely due to acute infection.  Telemetry unremarkable.  Orthostatics negative, but checked after IVF hydration.    Seen in the ED on 11/1 with laceration repair to the right scalp with staples and right eyebrow repair with sutures.  CT head with scalp hematomas and c/s was negative and discharge back to detention.    Seen in the ED 11/5 am after a mechanical fall with laceration to her left parietal scalp with 2 staples placed and discharge back to her detention.  CT head and C/S with no acute abnormality.  Patient returned to the ED again on 11/5 later in the day at her [facility] with another fall  "with generalized weakness and found to have a low grade fever of 100.1.  A Laceration of the left posterior scalp was repaired with staples.  - PT consulted and recommend TCU  - SW consult for assistance with placement  - will remove right eyebrow sutures today  - right scalp staples to be removed around 11/11  - left scalp staples to be removed around 11/15     Parkinson's Disease  Cognitive Impairment - parkinson's disease diagnosed 10/2018.  Followed by Neurology through .  Has baseline significant gait instability, orofacial dyskinesia and hallucinations.  SLUMS score 1/2022 was 21/30.  - continue pta Sinemet, Robinul and prn Seroquel.      Troponin and CK elevation - minimally elevated, likely related to falls.  Trop flat.  No chest pain.  CK now normal after IVF hydration.     Hx Chronic Neck Pain - h/o cervical spondylosis s/p cervical fusion.  CT c/s with no acute abnormalities.    - continue pta scheduled Tylenol and pta Robaxin prn was increased to tid     Hx Cdiff - hx of cdiff in 2021.  No recent diarrhea.  Will discuss starting prophylactic po Vancomycin with ID .        CODE: full  DVT: scd  GI: pepcid  Diet/fluids: regular  Disposition:  TCU       Deborah LARA-C  Hospitalist Physician Assistant  [facility]      Subjective & Interval Hx:    Patient feels improved since admission.  Still has some neck pain and upper back pain today.  Denies any chest pain, shortness of breath, abdominal pain.  Tolerating po.  Denies any hallucinations    Last 24 hr care team notes reviewed.   ROS:  4 point ROS including Respiratory, CV, GI and , other than that noted in the HPI, is negative.    Physical Exam:    Blood pressure (!) 146/80, pulse 81, temperature 97.5  F (36.4  C), temperature source Oral, resp. rate 18, height 1.549 m (5' 0.98\"), weight 51.7 kg (114 lb), SpO2 95 %.  General: Alert, interactive, NAD  HEENT: sclera anicteric, PERRL, right eye ecchymosis, sutures in place above " right eyebrow, Staples to head x3 areas from recent falls  Resp: clear to auscultation bilaterally, no crackles or wheezes  Cardiac: regular rate and rhythm, no murmur  Abdomen: Soft, nontender, nondistended. +BS.  No HSM or masses, no rebound or guarding.  Extremities: No LE edema  Skin: Warm and dry  Neuro: Alert & oriented x 3, moves all extremities equally    Labs & Images:  Reviewed in Epic   Medications:    Current Facility-Administered Medications   Medication     acetaminophen (TYLENOL) tablet 650 mg    Or     acetaminophen (TYLENOL) Suppository 650 mg     acetaminophen (TYLENOL) tablet 1,000 mg     carbidopa-levodopa (SINEMET CR)  MG per CR tablet 2 tablet     carbidopa-levodopa (SINEMET)  MG per tablet 1 tablet    And     carbidopa-levodopa half-tab 12.5-50 mg     carbidopa-levodopa (SINEMET)  MG per tablet 2 tablet     ceFEPIme (MAXIPIME) 2 g vial to attach to  ml bag for ADULTS or 50 ml bag for PEDS     famotidine (PEPCID) tablet 20 mg     glycopyrrolate (ROBINUL) tablet 1 mg     glycopyrrolate (ROBINUL) tablet 1 mg     melatonin tablet 1 mg     methocarbamol (ROBAXIN) half-tab 250 mg     ondansetron (ZOFRAN ODT) ODT tab 4 mg    Or     ondansetron (ZOFRAN) injection 4 mg     QUEtiapine (SEROquel) half-tab 12.5 mg     senna-docusate (SENOKOT-S/PERICOLACE) 8.6-50 MG per tablet 1 tablet     vancomycin (VANCOCIN) 1000 mg in dextrose 5% 200 mL PREMIX

## 2022-11-09 NOTE — CONSULTS
Consult Date: 11/08/2022    INFECTIOUS DISEASE CONSULTATION    LOCATION:  Room 224, [facility].    REFERRING PHYSICIAN:  Deborah De Leon MD.    IMPRESSION:    1.  A 77-year-old female, vague deterioration at her acute living facility, she has underlying Parkinson's, mild cognitive dysfunction and has had recent falls, sense of not being well until, some low-grade fever at admission.  Etiology not entirely clear, but now #2 below possible defining this illness.   2.  2/2 blood cultures from admission positive for Strep viridans, oralis subspecies, this is unlikely to be a contaminant and when in blood cultures and this high-grade fashion, the most common diagnosis is subacute bacterial endocarditis, conceivably could occur with aspiration, but no obvious aspiration pneumonia present.  3.  Minimally abnormal UA.  Some symptoms when prompted, but not volunteered, positive urine culture for pseudomonas probably not significant.  4.  Parkinson's disease with chronic mentation issues.  5.  Some notation of possible aspiration, but no clinical evidence of active pneumonia.    RECOMMENDATIONS:    1.  This is an unequivocally significant blood culture, 2/2 positive for Strep oralis implies true bacteremia and as such, probable endocarditis.  I would start with getting a transthoracic echocardiogram to see if it can define this for us.  2.  Getting cefepime now.  This is covering this okay and short course of treatment for the unlikely to be significant pseudomonas urine colonization is probably a logical  3.  How to approach this will depend on clinical progress, echocardiogram results, etc., may and the need to get a transesophageal echocardiogram here to really define how to approach this.    HISTORY:  This 77-year-old female is seen in consultation.  She is a relatively poor historian.  She has underlying Parkinson's with some mentation issues.  She was referred in because of simply not doing well.  She had  several falls, general weakness and malaise.  She is describing some urinary symptoms, but only after being prompted.  Overall, the patient has had unclear reasons.  There is a subjective sense of fever.  It is unclear whether she is truly having fever at admission had low-grade temperature elevations.  She also had slight leukocytosis.  Admission urinalysis was not very impressive, but culture is growing pseudomonas and greater than 100,000 quantity.  The patient does not describe any other focal or localizing symptoms.  She has no major heart valve disease that is known.  Her admission blood cultures 2/2 are now growing Strep viridans, subspecies oralis.    PAST MEDICAL HISTORY:  No major history of hospitalizations and sepsis, but some urinary tract infections, history of Parkinson's disease with progressive cognitive impairment.    SOCIAL AND FAMILY HISTORY:  Lives at a facility.  No recent travels or other exposures.  No known resistant pathogens.  No family history of any note, but not a very good historian.    ALLERGIES:  NO ANTIBIOTIC ALLERGIES.    REVIEW OF SYSTEMS:  Largely as listed above, general malaise, fatigue.  The recent falls, is having some slight urinary tract symptoms.  Rest of the 10-point review of systems without obvious localizing symptoms.    PHYSICAL EXAMINATION:    GENERAL:  The patient appears her stated age.  She looks mildly ill, slightly more confused than that described baseline and a poor historian.  VITAL SIGNS:  Currently include being afebrile initially had a low-grade temperature elevation.  Not tachycardic.  HEENT:  No thrush or intraoral lesions.  Pupils reactive.  NECK:  Supple and nontender, without lymphadenopathy or thyromegaly.  HEART:  Regular rhythm, slight irregularity with missed beats, but no significant tachycardia.  Slight systolic murmur, but not impressive.  LUNGS:  Clear bilaterally.  ABDOMEN:  Soft and nontender.  EXTREMITIES:  No embolic lesions hands or feet.   No joint abnormalities of note.  Slight edema.    LABORATORY DATA:  Initial white count slightly elevated at 13,000.  Urinalysis with 3 white cells, negative leukocyte esterase.  Culture is growing pseudomonas.  Most significantly blood cultures 2/2 growing Strep oralis.    Thank you very much for the consultation.  I will follow the patient with you.    Dangelo Rojas MD        D: 2022   T: 2022   MT: [id number]    Name:     BINH JUAREZReed  MRN:      [id number]        Account:      [id number]   :      1945           Consult Date: 2022     Document: [id number]

## 2022-11-09 NOTE — CONSULTS
Care Management Follow Up    Length of Stay (days): 2    Expected Discharge Date: 11/09/2022     Concerns to be Addressed: discharge planning     Patient plan of care discussed at interdisciplinary rounds: Yes    Anticipated Discharge Disposition: Transitional Care     Anticipated Discharge Services: None  Anticipated Discharge DME: None    Patient/family educated on Medicare website which has current facility and service quality ratings: Yes  Education Provided on the Discharge Plan:  Yes  Patient/Family in Agreement with the Plan: yes    Referrals Placed by CM/SW: Post Acute Facilities  Private pay costs discussed: private room/amenity fees and transportation costs    Additional Information:  CM following for discharge planning, PT is recommending TCU at discharge. Met with pt at bedside to discuss, she is agreeable to TCU at discharge. She also requests that CM call adelaide Villavicencio to update as well. They would like referrals sent to 3-5 star rated facilities on Medicare.gov website in [facility]. Referrals sent to [facility], [facility], [facility], [facility] and [facility]. Referrals sent for a private room per pt request, awaiting responses.     Ride at discharge TBD at this time, family vs WC pending mobility and availability.     Update 1550: Pt has been accepted to [facility]CU in a private room. Per provider, anticipate discharge on 11/11. Updated pt at bedside, will coordinate transportation closer to discharge. Attempted to update adelaide Villavicencio, no answer and unable to LM.     Kendra Hernandez RN BSN   Inpatient Care Coordination  [facility]   Phone [phone]

## 2022-11-09 NOTE — PROGRESS NOTES
[facility]  Infectious Disease Progress Note          Assessment and Plan:   IMPRESSION:    1.  A 77-year-old female, vague deterioration at her acute living facility, she has underlying Parkinson's, mild cognitive dysfunction and has had recent falls, sense of not being well until, some low-grade fever at admission.  Etiology not entirely clear, but now #2 below possible defining this illness.   2.  2/2 blood cultures from admission positive for Strep viridans, oralis subspecies, this is unlikely to be a contaminant and when in blood cultures and this high-grade fashion, the most common diagnosis is subacute bacterial endocarditis, conceivably could occur with aspiration, but no obvious aspiration pneumonia present.  3.  Minimally abnormal UA.  Some symptoms when prompted, but not volunteered, positive urine culture for pseudomonas probably not significant.  4.  Parkinson's disease with chronic mentation issues.  5.  Some notation of possible aspiration, but no clinical evidence of active pneumonia.     RECOMMENDATIONS:    1.  This is an unequivocally significant blood culture, 2/2 positive for Strep oralis implies true bacteremia and as such, probable endocarditis.    Difficult to interpret transthoracic echocardiogram with proceed to transesophageal echocardiogram to define the on the  2.  Getting cefepime now.  This is covering this okay and short course of treatment for the unlikely to be significant pseudomonas urine colonization is probably a logical  3.    If LEN shows signs of endocarditis plan 4-week course of ceftriaxone converting to that as soon as tomorrow, if no endocarditis possibly oral antibiotic course with close follow-up as an outpatient        Interval History:   no new complaints and doing well; no cp, sob, n/v/d, or abd pain.  Feels significantly better, no focal pain symptoms no ongoing urinary symptoms, temp down culture same as previously pansensitive organism        "       Medications:       acetaminophen  1,000 mg Oral TID     carbidopa-levodopa  2 tablet Oral At Bedtime     carbidopa-levodopa  1 tablet Oral BID    And     carbidopa-levodopa  1 half-tab Oral BID     carbidopa-levodopa  2 tablet Oral BID     ceFEPIme  2 g Intravenous Q8H     famotidine  20 mg Oral Daily     glycopyrrolate  1 mg Oral QAM     sodium chloride (PF)  3 mL Intravenous Q8H     vancomycin  1,000 mg Intravenous Q12H                  Physical Exam:   Blood pressure (!) 146/80, pulse 81, temperature 97.5  F (36.4  C), temperature source Oral, resp. rate 18, height 1.549 m (5' 0.98\"), weight 51.7 kg (114 lb), SpO2 95 %.  Wt Readings from Last 2 Encounters:   11/05/22 51.7 kg (114 lb)   01/12/22 51.7 kg (114 lb)     Vital Signs with Ranges  Temp:  [97.4  F (36.3  C)-98.3  F (36.8  C)] 97.5  F (36.4  C)  Pulse:  [78-88] 81  Resp:  [16-20] 18  BP: (146-168)/(72-87) 146/80  SpO2:  [93 %-97 %] 95 %    Constitutional: Awake, alert, cooperative, no apparent distress   Lungs: Clear to auscultation bilaterally, no crackles or wheezing   Cardiovascular: Regular rate and rhythm, normal S1 and S2, and no murmur noted   Abdomen: Normal bowel sounds, soft, non-distended, non-tender   Skin: No rashes, no cyanosis, no edema   Other:           Data:   All microbiology laboratory data reviewed.  Recent Labs   Lab Test 11/08/22  0705 11/07/22  0541 11/05/22  2154   WBC 8.2 6.7 10.9   HGB 12.5 11.4* 11.7   HCT 39.6 35.5 36.8   * 107* 107*    304 274     Recent Labs   Lab Test 11/08/22  0705 11/07/22  0541 11/05/22  2154   CR 0.53 0.44* 0.47*     No lab results found.  No lab results found.    Invalid input(s):     "

## 2022-11-09 NOTE — PLAN OF CARE
Pt A/Ox4, confused and forgetful at times. VSS, SBP elevated. Complains of upper back/upper arm pain. PRN Tylenol given. Assist of 1 and walker. Tolerating regular diet. PT recommends TCU upon discharge.

## 2022-11-10 ENCOUNTER — APPOINTMENT (OUTPATIENT)
Dept: CARDIOLOGY | Facility: CLINIC | Age: 77
DRG: 289 | End: 2022-11-10
Attending: PHYSICIAN ASSISTANT
Payer: MEDICARE

## 2022-11-10 LAB
GLUCOSE BLDC GLUCOMTR-MCNC: 134 MG/DL (ref 70–99)
LVEF ECHO: NORMAL
MAGNESIUM SERPL-MCNC: 1.9 MG/DL (ref 1.7–2.3)
POTASSIUM SERPL-SCNC: 3.9 MMOL/L (ref 3.4–5.3)

## 2022-11-10 PROCEDURE — 120N000001 HC R&B MED SURG/OB

## 2022-11-10 PROCEDURE — 93325 DOPPLER ECHO COLOR FLOW MAPG: CPT

## 2022-11-10 PROCEDURE — 250N000011 HC RX IP 250 OP 636

## 2022-11-10 PROCEDURE — 99232 SBSQ HOSP IP/OBS MODERATE 35: CPT | Performed by: INTERNAL MEDICINE

## 2022-11-10 PROCEDURE — 250N000011 HC RX IP 250 OP 636: Performed by: NURSE PRACTITIONER

## 2022-11-10 PROCEDURE — 93312 ECHO TRANSESOPHAGEAL: CPT | Mod: 26 | Performed by: INTERNAL MEDICINE

## 2022-11-10 PROCEDURE — 84132 ASSAY OF SERUM POTASSIUM: CPT | Performed by: NURSE PRACTITIONER

## 2022-11-10 PROCEDURE — 250N000009 HC RX 250: Performed by: EMERGENCY MEDICINE

## 2022-11-10 PROCEDURE — 93325 DOPPLER ECHO COLOR FLOW MAPG: CPT | Mod: 26 | Performed by: INTERNAL MEDICINE

## 2022-11-10 PROCEDURE — 93320 DOPPLER ECHO COMPLETE: CPT | Mod: 26 | Performed by: INTERNAL MEDICINE

## 2022-11-10 PROCEDURE — 250N000009 HC RX 250

## 2022-11-10 PROCEDURE — 250N000011 HC RX IP 250 OP 636: Performed by: PHYSICIAN ASSISTANT

## 2022-11-10 PROCEDURE — 99152 MOD SED SAME PHYS/QHP 5/>YRS: CPT | Performed by: INTERNAL MEDICINE

## 2022-11-10 PROCEDURE — 36415 COLL VENOUS BLD VENIPUNCTURE: CPT | Performed by: NURSE PRACTITIONER

## 2022-11-10 PROCEDURE — 250N000011 HC RX IP 250 OP 636: Performed by: EMERGENCY MEDICINE

## 2022-11-10 PROCEDURE — 250N000013 HC RX MED GY IP 250 OP 250 PS 637: Performed by: PHYSICIAN ASSISTANT

## 2022-11-10 PROCEDURE — 250N000013 HC RX MED GY IP 250 OP 250 PS 637: Performed by: HOSPITALIST

## 2022-11-10 PROCEDURE — 83735 ASSAY OF MAGNESIUM: CPT | Performed by: NURSE PRACTITIONER

## 2022-11-10 RX ORDER — LIDOCAINE 40 MG/G
CREAM TOPICAL
Status: DISCONTINUED | OUTPATIENT
Start: 2022-11-10 | End: 2022-11-12 | Stop reason: HOSPADM

## 2022-11-10 RX ORDER — GLYCOPYRROLATE 0.2 MG/ML
INJECTION INTRAMUSCULAR; INTRAVENOUS
Status: COMPLETED
Start: 2022-11-10 | End: 2022-11-10

## 2022-11-10 RX ORDER — SODIUM CHLORIDE 9 MG/ML
INJECTION, SOLUTION INTRAVENOUS CONTINUOUS PRN
Status: DISCONTINUED | OUTPATIENT
Start: 2022-11-10 | End: 2022-11-12 | Stop reason: HOSPADM

## 2022-11-10 RX ORDER — LIDOCAINE HYDROCHLORIDE 40 MG/ML
1.5 SOLUTION TOPICAL ONCE
Status: DISCONTINUED | OUTPATIENT
Start: 2022-11-10 | End: 2022-11-10

## 2022-11-10 RX ORDER — ENOXAPARIN SODIUM 100 MG/ML
40 INJECTION SUBCUTANEOUS EVERY 24 HOURS
Status: DISCONTINUED | OUTPATIENT
Start: 2022-11-10 | End: 2022-11-12 | Stop reason: HOSPADM

## 2022-11-10 RX ORDER — FENTANYL CITRATE 50 UG/ML
INJECTION, SOLUTION INTRAMUSCULAR; INTRAVENOUS
Status: COMPLETED
Start: 2022-11-10 | End: 2022-11-10

## 2022-11-10 RX ORDER — FENTANYL CITRATE 50 UG/ML
25 INJECTION, SOLUTION INTRAMUSCULAR; INTRAVENOUS
Status: DISCONTINUED | OUTPATIENT
Start: 2022-11-10 | End: 2022-11-10

## 2022-11-10 RX ORDER — NALOXONE HYDROCHLORIDE 0.4 MG/ML
0.4 INJECTION, SOLUTION INTRAMUSCULAR; INTRAVENOUS; SUBCUTANEOUS
Status: DISCONTINUED | OUTPATIENT
Start: 2022-11-10 | End: 2022-11-10

## 2022-11-10 RX ORDER — LIDOCAINE HYDROCHLORIDE 20 MG/ML
15 SOLUTION OROPHARYNGEAL ONCE
Status: DISCONTINUED | OUTPATIENT
Start: 2022-11-10 | End: 2022-11-10

## 2022-11-10 RX ORDER — DEXTROSE MONOHYDRATE 25 G/50ML
9.5 INJECTION, SOLUTION INTRAVENOUS
Status: DISCONTINUED | OUTPATIENT
Start: 2022-11-10 | End: 2022-11-10

## 2022-11-10 RX ORDER — GLYCOPYRROLATE 0.2 MG/ML
0.1 INJECTION, SOLUTION INTRAMUSCULAR; INTRAVENOUS ONCE
Status: DISCONTINUED | OUTPATIENT
Start: 2022-11-10 | End: 2022-11-10

## 2022-11-10 RX ORDER — NALOXONE HYDROCHLORIDE 0.4 MG/ML
0.2 INJECTION, SOLUTION INTRAMUSCULAR; INTRAVENOUS; SUBCUTANEOUS
Status: DISCONTINUED | OUTPATIENT
Start: 2022-11-10 | End: 2022-11-10

## 2022-11-10 RX ORDER — FLUMAZENIL 0.1 MG/ML
0.2 INJECTION, SOLUTION INTRAVENOUS
Status: DISCONTINUED | OUTPATIENT
Start: 2022-11-10 | End: 2022-11-10

## 2022-11-10 RX ORDER — LIDOCAINE 50 MG/G
OINTMENT TOPICAL ONCE
Status: DISCONTINUED | OUTPATIENT
Start: 2022-11-10 | End: 2022-11-10

## 2022-11-10 RX ORDER — LIDOCAINE HYDROCHLORIDE 20 MG/ML
SOLUTION OROPHARYNGEAL
Status: COMPLETED
Start: 2022-11-10 | End: 2022-11-10

## 2022-11-10 RX ADMIN — CARBIDOPA AND LEVODOPA 2 TABLET: 25; 100 TABLET, EXTENDED RELEASE ORAL at 23:04

## 2022-11-10 RX ADMIN — ACETAMINOPHEN 1000 MG: 500 TABLET, FILM COATED ORAL at 05:05

## 2022-11-10 RX ADMIN — MIDAZOLAM HYDROCHLORIDE 1 MG: 1 INJECTION, SOLUTION INTRAMUSCULAR; INTRAVENOUS at 14:12

## 2022-11-10 RX ADMIN — LIDOCAINE HYDROCHLORIDE 30 ML: 20 SOLUTION ORAL at 13:53

## 2022-11-10 RX ADMIN — CARBIDOPA AND LEVODOPA 1 TABLET: 25; 100 TABLET ORAL at 17:11

## 2022-11-10 RX ADMIN — ENOXAPARIN SODIUM 40 MG: 40 INJECTION SUBCUTANEOUS at 17:39

## 2022-11-10 RX ADMIN — ACETAMINOPHEN 650 MG: 325 TABLET, FILM COATED ORAL at 23:04

## 2022-11-10 RX ADMIN — GLYCOPYRROLATE 1 MG: 1 TABLET ORAL at 08:33

## 2022-11-10 RX ADMIN — CARBIDOPA AND LEVODOPA 2 TABLET: 25; 100 TABLET ORAL at 20:35

## 2022-11-10 RX ADMIN — FENTANYL CITRATE 25 MCG: 50 INJECTION, SOLUTION INTRAMUSCULAR; INTRAVENOUS at 14:10

## 2022-11-10 RX ADMIN — CARBIDOPA AND LEVODOPA 1 HALF-TAB: 25; 100 TABLET ORAL at 08:32

## 2022-11-10 RX ADMIN — ACETAMINOPHEN 1000 MG: 500 TABLET, FILM COATED ORAL at 12:21

## 2022-11-10 RX ADMIN — CEFEPIME HYDROCHLORIDE 2 G: 2 INJECTION, POWDER, FOR SOLUTION INTRAVENOUS at 05:06

## 2022-11-10 RX ADMIN — MIDAZOLAM HYDROCHLORIDE 1 MG: 1 INJECTION, SOLUTION INTRAMUSCULAR; INTRAVENOUS at 14:10

## 2022-11-10 RX ADMIN — TOPICAL ANESTHETIC 1.5 ML: 200 SPRAY DENTAL; PERIODONTAL at 13:58

## 2022-11-10 RX ADMIN — MIDAZOLAM HYDROCHLORIDE 1 MG: 1 INJECTION, SOLUTION INTRAMUSCULAR; INTRAVENOUS at 14:20

## 2022-11-10 RX ADMIN — VANCOMYCIN HYDROCHLORIDE 1000 MG: 1 INJECTION, SOLUTION INTRAVENOUS at 21:40

## 2022-11-10 RX ADMIN — ACETAMINOPHEN 1000 MG: 500 TABLET, FILM COATED ORAL at 17:10

## 2022-11-10 RX ADMIN — CARBIDOPA AND LEVODOPA 2 TABLET: 25; 100 TABLET ORAL at 12:21

## 2022-11-10 RX ADMIN — CEFEPIME HYDROCHLORIDE 2 G: 2 INJECTION, POWDER, FOR SOLUTION INTRAVENOUS at 20:35

## 2022-11-10 RX ADMIN — VANCOMYCIN HYDROCHLORIDE 1000 MG: 1 INJECTION, SOLUTION INTRAVENOUS at 08:23

## 2022-11-10 RX ADMIN — FAMOTIDINE 20 MG: 20 TABLET ORAL at 08:32

## 2022-11-10 RX ADMIN — GLYCOPYRROLATE 0.1 MG: 0.2 INJECTION, SOLUTION INTRAMUSCULAR; INTRAVENOUS at 13:53

## 2022-11-10 RX ADMIN — CARBIDOPA AND LEVODOPA 1 TABLET: 25; 100 TABLET ORAL at 08:32

## 2022-11-10 RX ADMIN — CARBIDOPA AND LEVODOPA 1 HALF-TAB: 25; 100 TABLET ORAL at 17:11

## 2022-11-10 RX ADMIN — CEFEPIME HYDROCHLORIDE 2 G: 2 INJECTION, POWDER, FOR SOLUTION INTRAVENOUS at 12:23

## 2022-11-10 ASSESSMENT — ACTIVITIES OF DAILY LIVING (ADL)
ADLS_ACUITY_SCORE: 42
ADLS_ACUITY_SCORE: 46
ADLS_ACUITY_SCORE: 42
ADLS_ACUITY_SCORE: 46
ADLS_ACUITY_SCORE: 46

## 2022-11-10 NOTE — PROGRESS NOTES
"Vitals: BP (!) 164/90 (BP Location: Right arm)   Pulse 82   Temp 98.1  F (36.7  C) (Oral)   Resp 18   Ht 1.549 m (5' 0.98\")   Wt 51.7 kg (114 lb)   SpO2 93%   BMI 21.55 kg/m       [id number]  Admitted Diagnosis- Recurrent Falls/ UTI.   Neuro: alert and oriented x3. Disoriented to time Forgetful.   Cardiac: wdl  Lungs: wdl  GI: wdl  : wdl ex for urinary incontinence/ frequency   Pain: chronic neck and back pain. Tylenol in use  IV: Cefepime for UTI and Vanco for positive blood cultures. Saline locked in between abx   Labs/Imaging: BC from 11/7 negative so far. Kcl 4.1. Mg 1.8.  Diet: regular diet. NPO after midnight for LEN  Activity: Extensive assist of x1 with a walker and galt belt. Pt has a shuffling gait.  Skin: Laceration of the left posterior scalp. Right scalp staples to be removed around 11/11  and left scalp staples to be removed around 11/15  Consult: ID/ PT/ SW  Plan: LEN today to rule out endocarditis. Pt has been accepted to [facility] . Anticipated discharge 11/11    Will continue to monitor and provide supportive care.      "

## 2022-11-10 NOTE — PROGRESS NOTES
"INPATIENT NOTE: [id number]     PRIMARY PROBLEM: Generalized weakness/falls     Vital Signs: BP (!) 148/85 (BP Location: Right arm)   Pulse 71   Temp 98.4  F (36.9  C) (Oral)   Resp 18   Ht 1.549 m (5' 0.98\")   Wt 51.7 kg (114 lb)   SpO2 95%   BMI 21.55 kg/m         Orientation: A/O x4   Pain status: Denies  Resp: Lung sounds clear, denies any SOB  Cardiac: WDL, Tele NO:   GI: Bowel sounds Normoactive.   : Occasional incontinence   Skin:Laceration to right eyebrow. Staples posterior scalp   LDA: Peripheral IV to Right arm, saline locked   Pertinent Labs/Imaging: LEN   Diet: NPO for LEN, advanced to cold liquids (tolerating), tried warm liquids (not tolerating/coughing), regular try ordered and held at nurses station for when appropriate.   Activity: A1 walker/gait belt   Alarms/Safety: All alarms on and audible   Consults: ID, SW, PT, OT   Discharge Plan: [place] once medically cleared   Discharge Date: 11/11/22 (possibly)        Answers A&O questions appropriately, but has occasional confusion. Keeps eyes closed during conversations, opens intermittently. Denies pain. LEN today, back on floor at 16:00. Staples on posterior scalp intact. A1 with walker and gait belt (moves very slowly). Peripheral IV saline locked. From assisted living.    Casi Wise RN   "

## 2022-11-10 NOTE — PROGRESS NOTES
[facility]    Progress Note- Hospitalist Service       Date of Admission:  11/5/2022  HD # 5    Assessment & Plan      Pablo Baez is a 77 year old female admitted on 11/5/2022. She 77-year-old female past medical history pertinent for Parkinson's disease, UTI, BPPV, and cognitive impairment who presents from her assisted living facility with recurrent falls.      Acute medical issues:  #Pseudomonas UTI  #Streptococcus oralis bacteremia  She presented to the emergency department with recurrent falls and was noted to have a temperature of 100.1 degree.  Mild leukocytosis with a white blood cell count of 13.9 on 5 November which improved to 10.9 on repeat evaluation.  Her viral panel was negative including influenza RSV and COVID.  Chest x-ray was negative.  UA without clear signs of infection at time of admission but was treated empirically with ceftriaxone.  Urine culture now growing Pseudomonas.  Ceftriaxone was discontinued on 7 November and started on IV cefepime (day #4) on 11/6 and blood cultures were obtained which are now growing Streptococcus oralis.  Repeat blood cultures have been negative.  Vancomycin was started on 11/6/2022.  Infectious disease was consulted on 11 8 2 and recommended an echocardiogram to evaluate for endocarditis.  Transthoracic echocardiogram on 9 November revealed valves to be thickened without obvious vegetation.  A transesophageal echocardiogram was obtained obtained on 10/11/2022.  The transesophageal echo shows a tiny mobile area on the distal portion of the anterior leaflet of the atrial side.  This is concerning for possible early vegetation.  Likewise, on the 2 chamber view there appears to be at least 2 tiny mobile areas on the atrial side of the mitral valve which could also represent small vegetations.  Per infectious disease notes, will be planning for 4-week course of ceftriaxone.    -- Appreciate cardiology and infectious disease  consult.  --Continue IV antibiotics.  Antibiotics for now include cefepime and vancomycin.  --Blood cultures from 11/7 and 11/8 remain no growth to date.  -- Attempted to contact family but was unable to touch base.    #Recurrent falls  #Closed head injury  #Parkinson's disease with gait instability  She presents from assisted living facility with frequent falls.  Likely due to acute infection.  Telemetry has been unremarkable.  Orthostatics were negative but checked after IV fluid hydration.  Since she was seen in the emergency department on 11 1 with laceration repair to the right scalp with staples in right eyebrow repair with sutures.  CT head with scalp hematomas and cervical spine was without acute abnormality.  She returned to the emergency department again on 11 5 after having a fall in her assisted living facility.  At that time she was found to have a low-grade fever of 100.1.  She also sustained a posterior scalp laceration on the left side which was repaired with staples.  She has been seen by physical therapy who recommends TCU.  Case management is on the case and is assisting with placement.  Right eyebrow sutures were removed on 11/9/2022.  Right scalp staples to be removed around 1111 or 1112.  And left scalp staples will be removed on or around 11/15.    Chronic medical issues:  #Parkinson's disease  #Cognitive impairment  Parkinson's disease was diagnosed in October 2018.  She is followed through neurology with [facility] neurology.  She is known to have baseline gait instability, orofacial dyskinesia, and hallucinations.  In January 2022 her slums score was 21 out of 30.  She is currently on Sinemet, Robinul, as needed Seroquel.    --Continue above meds.    #Detectable troponin - resolved.  Most likely demand ischemia.  #CK elevation  -resolved  Both were minimally elevated and thought to be likely related to the falls.  Her troponin remains flat.  No chest pain.  CK normalized after IV fluid  hydration.    #History of chronic pain: (Chronic neck pain/cervical spondylosis).  She is status post cervical fusion.  CT of the C-spine with no acute abnormalities.    --Continue scheduled Tylenol and Robaxin    #History of C. difficile colitis.  History of C. difficile colitis in 2021.  No recent diarrhea.    --No acute issues.       Diet: Advance Diet as Tolerated: Regular Diet Adult; Regular Diet Adult    DVT Prophylaxis: Enoxaparin (Lovenox) SQ  11/10/2022   Sales Catheter: Not present  Central Lines: None  Cardiac Monitoring: None  Code Status: Full Code        Disposition Plan      Expected Discharge Date: 11/11/2022      Destination: inpatient rehabilitation facility          The patient's care was discussed with the Bedside Nurse, Patient and ID and Cardiology Consultant.    OSCAR Dunne [facility]  Hospitalist Service  [facility]  Securely message with the Vocera Web Console (learn more here)  Text page via [employer] Paging/Directory         ______________________________________________________________________    Interval events:  Ms. Baez was seen and examined late this afternoon after returning from LEN.  Resting comfortably.  No significant distress.  Attempted to update family on results.  Unable to enact with family today.  LEN concerning for possible early vegetation.  Need protracted course of IV antibiotics.      Review of Systems    Denies any chest pain, shortness of breath, fever, chills, or other significant constitutional symptoms.      Physical Exam   Vital Signs: Temp: 98.4  F (36.9  C) Temp src: Oral BP: 128/67 Pulse: 71   Resp: 17 SpO2: 93 % O2 Device: None (Room air) Oxygen Delivery: 2 LPM  Weight: 114 lbs 0 oz    General: Sedated NAD  HEENT: sclera anicteric, PERRL, right eye ecchymosis, sutures in place above right eyebrow, Staples to head from recent falls  Resp: clear to auscultation bilaterally, no crackles or wheezes  Cardiac: regular rate and rhythm, no  murmur  Abdomen: Soft, nontender, nondistended. +BS.  No HSM or masses, no rebound or guarding.  Extremities: No LE edema  Skin: Warm and dry  Neuro: Alert & oriented x 3, moves all extremities equally     Data   Data reviewed today: I reviewed all medications, new labs and imaging results over the last 24 hours. I personally reviewed    Recent Labs   Lab 11/10/22  1024 22  0643 22  0705 22  1331 22  0541 22  2154 22  1145   WBC  --   --  8.2  --  6.7 10.9 13.9*   HGB  --   --  12.5  --  11.4* 11.7 13.1   MCV  --   --  107*  --  107* 107* 106*   PLT  --   --  339  --  304 274 300   INR  --   --   --   --   --   --  1.21*   NA  --   --  141  --  139 138 138   POTASSIUM 3.9 4.1 4.0   < > 3.3* 3.6 4.0   CHLORIDE  --   --  103  --  103 102 99   CO2  --   --  30*  --  28 27 27   BUN  --   --  10.4  --  7.4* 12.8 15.3   CR  --   --  0.53  --  0.44* 0.47* 0.51   ANIONGAP  --   --  8  --  8 9 12   GIOVANI  --   --  9.5  --  8.7* 9.4 9.8   GLC  --   --  110*  --  109* 119* 118*    < > = values in this interval not displayed.     Recent Results (from the past 24 hour(s))   Transesophageal Echocardiogram   Result Value    LVEF  75-80%    Narrative    [id number]  [id number]  [id number]  [id number]^JESE^S     [facility]  Echocardiography Laboratory  [address] [place] [address]     Name: BINH JUAREZ  MRN: [id number]  : 1945  Study Date: 11/10/2022 02:01 PM  Age: 77 yrs  Gender: Female  Patient Location: [facility]  Reason For Study: Endocarditis  Ordering Physician: JESE WELCH  Performed By: Ericka Wiseman     BSA: 1.5 m2  Height: 60 in  Weight: 114 lb  HR: 93  BP: 132/68 mmHg  ______________________________________________________________________________  Procedure  Complete LEN Adult. LEN Probe serial #[id number] (R) was used during the  procedure. The heart rate, respiratory rate and response to care were  monitored throughout the procedure with the  assistance of the nurse.  ______________________________________________________________________________  Interpretation Summary     There is a tiny mobile area on the distal portion of the anterior leaflet on  the atrial side. This could represent an early vegetation. On the two chamber  view there appears to be at least two tiny mobile areas on the atrial side of  the mitral valve which may represent small vegetations.  ______________________________________________________________________________  LEN  I determined this patient to be an appropriate candidate for the planned  sedation and procedure and have reassessed the patient immediately prior to  sedation and procedure. Total sedation time: 20 minutes of continuous bedside  1:1 monitoring. Versed (3mg) was given intravenously. Fentanyl (25mcg) was  given intravenously. Consent to the procedure was obtained prior to sedation.  Prior to the exam, the oral cavity was checked and no overcrowding was noted.  The transesophageal probe was passed without difficulty. There were no  complications associated with this procedure.     Left Ventricle  The left ventricle is normal in size. Hyperdynamic left ventricular function.  The visual ejection fraction is estimated at 75-80%.     Right Ventricle  The right ventricle is normal in size and function.     Atria  The left atrium is mildly dilated. Spontaneous contrast in left atrium. No  left atrial mass or thrombus visualized. Right atrial size is normal. No  evidence of right atrial mass/thrombus. There is no color Doppler evidence of  an atrial shunt. A contrast injection (Bubble Study) was performed that was  mildly positive for flow across the interatrial septum. A contrast injection  (Bubble Study) was performed that was positive late after the injection. No  evidence of PFO but occasional bubbles appear late consistent with a small  peripheral A-V connection. The left atrial appendage was well visualized and  free  of thrombus.     Mitral Valve  There is a tiny mobile area on the distal portion of the anterior leaflet on  the atrial side. This could represent an early vegetation. On the two chamber  view there appears to be at least tow tiny mobile areas on the atrial side of  the mitral valve which may represent small vegetations. There is trace mitral  regurgitation.     Tricuspid Valve  There is no vegetation on the tricuspid valve. There is trace tricuspid  regurgitation.     Aortic Valve  The aortic valve is trileaflet. There is trivial trileaflet aortic sclerosis.  Lambdl's excrescences noted. There is no vegetation on the aortic valve. No  aortic regurgitation is present. No hemodynamically significant valvular  aortic stenosis.     Pulmonic Valve  There is no vegetation on the pulmonic valve. There is trace pulmonic valvular  regurgitation. Normal pulmonic valve velocity.     Vessels  The aortic root is normal size. (3.5 cm). Normal size ascending aorta. (3.3  cm). Mild atherosclerotic plaque(s) in the descending aorta. Normal pulmonary  venous drainage.     Pericardial/Pleural  There is no pericardial effusion.     Rhythm  Sinus rhythm was noted.  ______________________________________________________________________________  ______________________________________________________________________________  Tiny mass on atrial side of the mitral valve.     Report approved by: Matt Chandra 11/10/2022 03:02 PM           Medications reviewed in epic.

## 2022-11-10 NOTE — PRE-PROCEDURE
GENERAL PRE-PROCEDURE:   Procedure:  LEN  Date/Time:  11/10/2022 2:03 PM    Written consent obtained?: Yes    Risks and benefits: Risks, benefits and alternatives were discussed    Consent given by:  Patient  Patient states understanding of procedure being performed: Yes    Patient's understanding of procedure matches consent: Yes    Procedure consent matches procedure scheduled: Yes    Expected level of sedation:  Moderate  Appropriately NPO:  Yes  Mallampati  :  Grade 2- soft palate, base of uvula, tonsillar pillars, and portion of posterior pharyngeal wall visible  Lungs:  Lungs clear with good breath sounds bilaterally  Heart:  Normal heart sounds and rate  History & Physical reviewed:  History and physical reviewed and no updates needed  Statement of review:  I have reviewed the lab findings, diagnostic data, medications, and the plan for sedation

## 2022-11-10 NOTE — PROGRESS NOTES
"Vitals: BP (!) 149/78 (BP Location: Left arm)   Pulse 82   Temp 97.9  F (36.6  C) (Oral)   Resp 22   Ht 1.549 m (5' 0.98\")   Wt 51.7 kg (114 lb)   SpO2 95%   BMI 21.55 kg/m       [id number]  Admitted Diagnosis- Recurrent Falls.   Neuro: alert and oriented x4. Forgetful.   Cardiac: wdl  Lungs: wdl  GI: wdl  : wdl ex for urinary incontinence/ frequency   Pain: chronic back pain. Tylenol in use  IV: Cefepime for UTI and Vanco for pos BC. Saline locked between abx   Labs/Imaging: BC from 11/7 negative so far. Kcl 4.1. Mg 1.8.  Diet: regular diet. NPO after midnight for LEN  Activity: assist of x1 with a walker and galt belt  Consult: ID/ PT/ SW  Plan: LEN tomorrow to rule out pericarditis. Continue IV abx. discharge to TCU when medically clear.    Will continue to monitor and provide supportive care.      "

## 2022-11-10 NOTE — PLAN OF CARE
Goal Outcome Evaluation:         Pt returned to #[id number] and report given to bedside RN, VSS, awake and alert, advance diet instructions given.

## 2022-11-10 NOTE — PROGRESS NOTES
[facility]  Infectious Disease Progress Note          Assessment and Plan:   IMPRESSION:    1.  A 77-year-old female, vague deterioration at her acute living facility, she has underlying Parkinson's, mild cognitive dysfunction and has had recent falls, sense of not being well until, some low-grade fever at admission.  Etiology not entirely clear, but now #2 below possible defining this illness.   2.  2/2 blood cultures from admission positive for Strep viridans, oralis subspecies, this is unlikely to be a contaminant and when in blood cultures and this high-grade fashion, the most common diagnosis is subacute bacterial endocarditis, conceivably could occur with aspiration, but no obvious aspiration pneumonia present.  3.  Minimally abnormal UA.  Some symptoms when prompted, but not volunteered, positive urine culture for pseudomonas probably not significant.  4.  Parkinson's disease with chronic mentation issues.  5.  Some notation of possible aspiration, but no clinical evidence of active pneumonia.     RECOMMENDATIONS:    1.  This is a significant blood culture, 2/2 positive for Strep oralis implies true bacteremia and as such, probable endocarditis.    Difficult to interpret transthoracic echocardiogram with proceed to transesophageal echocardiogram to define the on the  2.  Getting cefepime now.  This is covering this okay and short course of treatment for the unlikely to be significant pseudomonas urine colonization is probably a logical  3.    If LEN shows signs of endocarditis plan 4-week course of ceftriaxone converting to that as soon as now, if no endocarditis possibly oral antibiotic course with close follow-up as an outpatient tomorrow        Interval History:   no new complaints and doing well; no cp, sob, n/v/d, or abd pain.  Feels significantly better, no focal pain symptoms no ongoing urinary symptoms, temp down culture same as previously pansensitive organismTEE pending        "       Medications:       glycopyrrolate         lidocaine (viscous)         acetaminophen  1,000 mg Oral TID     benzocaine 20%  1-4 spray Mouth/Throat Once     carbidopa-levodopa  2 tablet Oral At Bedtime     carbidopa-levodopa  1 tablet Oral BID    And     carbidopa-levodopa  1 half-tab Oral BID     carbidopa-levodopa  2 tablet Oral BID     ceFEPIme  2 g Intravenous Q8H     famotidine  20 mg Oral Daily     glycopyrrolate  0.1 mg Intravenous Once     glycopyrrolate  1 mg Oral QAM     lidocaine (viscous)  15 mL Mouth/Throat Once     lidocaine   Topical Once    Or     lidocaine  1.5 mL Topical Once     sodium chloride (PF)  3 mL Intracatheter Q8H     sodium chloride (PF)  3 mL Intravenous Q8H     vancomycin  1,000 mg Intravenous Q12H                  Physical Exam:   Blood pressure (!) 146/68, pulse 81, temperature 98.4  F (36.9  C), temperature source Oral, resp. rate 15, height 1.549 m (5' 0.98\"), weight 51.7 kg (114 lb), SpO2 94 %.  Wt Readings from Last 2 Encounters:   11/05/22 51.7 kg (114 lb)   01/12/22 51.7 kg (114 lb)     Vital Signs with Ranges  Temp:  [97.8  F (36.6  C)-98.4  F (36.9  C)] 98.4  F (36.9  C)  Pulse:  [77-82] 81  Resp:  [15-22] 15  BP: (141-165)/(68-90) 146/68  SpO2:  [93 %-95 %] 94 %    Constitutional: Awake, alert, cooperative, no apparent distress   Lungs: Clear to auscultation bilaterally, no crackles or wheezing   Cardiovascular: Regular rate and rhythm, normal S1 and S2, and no murmur noted   Abdomen: Normal bowel sounds, soft, non-distended, non-tender   Skin: No rashes, no cyanosis, no edema   Other:           Data:   All microbiology laboratory data reviewed.  Recent Labs   Lab Test 11/08/22  0705 11/07/22  0541 11/05/22  2154   WBC 8.2 6.7 10.9   HGB 12.5 11.4* 11.7   HCT 39.6 35.5 36.8   * 107* 107*    304 274     Recent Labs   Lab Test 11/08/22  0705 11/07/22  0541 11/05/22  2154   CR 0.53 0.44* 0.47*     No lab results found.  No lab results found.    Invalid " input(s): KYLE

## 2022-11-11 LAB
CREAT SERPL-MCNC: 0.51 MG/DL (ref 0.51–0.95)
GFR SERPL CREATININE-BSD FRML MDRD: >90 ML/MIN/1.73M2
MAGNESIUM SERPL-MCNC: 2 MG/DL (ref 1.7–2.3)
POTASSIUM SERPL-SCNC: 3.8 MMOL/L (ref 3.4–5.3)

## 2022-11-11 PROCEDURE — 36415 COLL VENOUS BLD VENIPUNCTURE: CPT | Performed by: EMERGENCY MEDICINE

## 2022-11-11 PROCEDURE — 250N000011 HC RX IP 250 OP 636: Performed by: NURSE PRACTITIONER

## 2022-11-11 PROCEDURE — 250N000011 HC RX IP 250 OP 636: Performed by: INTERNAL MEDICINE

## 2022-11-11 PROCEDURE — 250N000013 HC RX MED GY IP 250 OP 250 PS 637: Performed by: HOSPITALIST

## 2022-11-11 PROCEDURE — 272N000748 HC KIT, CATH 3FR OR 4FR SINGLE LUMEN POWERMIDLINE

## 2022-11-11 PROCEDURE — 36569 INSJ PICC 5 YR+ W/O IMAGING: CPT

## 2022-11-11 PROCEDURE — 250N000011 HC RX IP 250 OP 636: Performed by: PHYSICIAN ASSISTANT

## 2022-11-11 PROCEDURE — 83735 ASSAY OF MAGNESIUM: CPT | Performed by: NURSE PRACTITIONER

## 2022-11-11 PROCEDURE — 99222 1ST HOSP IP/OBS MODERATE 55: CPT | Mod: FS | Performed by: NURSE PRACTITIONER

## 2022-11-11 PROCEDURE — 250N000013 HC RX MED GY IP 250 OP 250 PS 637: Performed by: PHYSICIAN ASSISTANT

## 2022-11-11 PROCEDURE — 120N000001 HC R&B MED SURG/OB

## 2022-11-11 PROCEDURE — 99232 SBSQ HOSP IP/OBS MODERATE 35: CPT | Performed by: PHYSICIAN ASSISTANT

## 2022-11-11 PROCEDURE — 99233 SBSQ HOSP IP/OBS HIGH 50: CPT | Performed by: INTERNAL MEDICINE

## 2022-11-11 PROCEDURE — 84132 ASSAY OF SERUM POTASSIUM: CPT | Performed by: NURSE PRACTITIONER

## 2022-11-11 PROCEDURE — 82565 ASSAY OF CREATININE: CPT | Performed by: EMERGENCY MEDICINE

## 2022-11-11 PROCEDURE — 250N000011 HC RX IP 250 OP 636: Performed by: EMERGENCY MEDICINE

## 2022-11-11 RX ORDER — CEFTRIAXONE 2 G/1
2 INJECTION, POWDER, FOR SOLUTION INTRAMUSCULAR; INTRAVENOUS
Status: DISCONTINUED | OUTPATIENT
Start: 2022-11-11 | End: 2022-11-12 | Stop reason: HOSPADM

## 2022-11-11 RX ORDER — CEFTRIAXONE 1 G/1
2000 INJECTION, POWDER, FOR SOLUTION INTRAMUSCULAR; INTRAVENOUS DAILY
Qty: 600 ML | Refills: 0 | Status: SHIPPED | OUTPATIENT
Start: 2022-11-11 | End: 2022-12-02

## 2022-11-11 RX ADMIN — CARBIDOPA AND LEVODOPA 1 HALF-TAB: 25; 100 TABLET ORAL at 17:31

## 2022-11-11 RX ADMIN — CARBIDOPA AND LEVODOPA 1 HALF-TAB: 25; 100 TABLET ORAL at 08:41

## 2022-11-11 RX ADMIN — GLYCOPYRROLATE 1 MG: 1 TABLET ORAL at 08:42

## 2022-11-11 RX ADMIN — VANCOMYCIN HYDROCHLORIDE 1000 MG: 1 INJECTION, SOLUTION INTRAVENOUS at 11:01

## 2022-11-11 RX ADMIN — CARBIDOPA AND LEVODOPA 2 TABLET: 25; 100 TABLET ORAL at 11:29

## 2022-11-11 RX ADMIN — CEFTRIAXONE 2 G: 2 INJECTION, POWDER, FOR SOLUTION INTRAMUSCULAR; INTRAVENOUS at 14:52

## 2022-11-11 RX ADMIN — CARBIDOPA AND LEVODOPA 1 TABLET: 25; 100 TABLET ORAL at 08:43

## 2022-11-11 RX ADMIN — CARBIDOPA AND LEVODOPA 2 TABLET: 25; 100 TABLET ORAL at 20:59

## 2022-11-11 RX ADMIN — ACETAMINOPHEN 650 MG: 325 TABLET, FILM COATED ORAL at 23:32

## 2022-11-11 RX ADMIN — ACETAMINOPHEN 1000 MG: 500 TABLET, FILM COATED ORAL at 11:30

## 2022-11-11 RX ADMIN — ACETAMINOPHEN 1000 MG: 500 TABLET, FILM COATED ORAL at 05:22

## 2022-11-11 RX ADMIN — CEFEPIME HYDROCHLORIDE 2 G: 2 INJECTION, POWDER, FOR SOLUTION INTRAVENOUS at 05:22

## 2022-11-11 RX ADMIN — CARBIDOPA AND LEVODOPA 1 TABLET: 25; 100 TABLET ORAL at 17:31

## 2022-11-11 RX ADMIN — CARBIDOPA AND LEVODOPA 2 TABLET: 25; 100 TABLET, EXTENDED RELEASE ORAL at 23:28

## 2022-11-11 RX ADMIN — ENOXAPARIN SODIUM 40 MG: 40 INJECTION SUBCUTANEOUS at 17:30

## 2022-11-11 RX ADMIN — ACETAMINOPHEN 1000 MG: 500 TABLET, FILM COATED ORAL at 17:30

## 2022-11-11 RX ADMIN — FAMOTIDINE 20 MG: 20 TABLET ORAL at 08:42

## 2022-11-11 ASSESSMENT — ACTIVITIES OF DAILY LIVING (ADL)
ADLS_ACUITY_SCORE: 43
ADLS_ACUITY_SCORE: 41
ADLS_ACUITY_SCORE: 42
ADLS_ACUITY_SCORE: 43
ADLS_ACUITY_SCORE: 42
ADLS_ACUITY_SCORE: 42
ADLS_ACUITY_SCORE: 43
ADLS_ACUITY_SCORE: 42

## 2022-11-11 NOTE — CONSULTS
[facility]    Cardiology Consultation     Date of Admission:  11/5/2022    Assessment & Plan   Pablo Baez is a 77 year old female who was admitted on 11/5/2022.    1. Bacterial Endocarditis  - Blood Cultures positive for streptococcus Oralis   - Echocardiogram 11/9 showed normal EF 60-65%, no wall motion abnormalities and no obvious vegetation.   - LEN today showed a tiny mobile area of the distal portion of the anterior mitral leaflet on the atrial side, representing early vegetation. On the two chamber view there appears to be at least two tiny mobile areas on the atrial side of the mitral valve which may represent small vegetations.    2. Parkinsons w/ recent fall   3. Mild troponin elevation, likely demand ischemia in the setting of #2  4. Mild cognitive impairment - Resolved.   5. BPPV    Plan   1.  LEN confirmed endocarditis of the anterior mitral leaflet.   ID following, plans for 4 week course of ceftriaxone.   2. Follow up TTE after completion of antibiotic treatment   3. Follow up with cardiology in 4-6 weeks.       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  She carries a past medical history significant for Parkinson's, BPPV, UTI and cognitive impairment.     In the days prior to her admission she had become more weak and fallen on 3 separate occasions.  She was brought to the emergency room by her assisted living facility for further evaluation.  She was noted to have a low-grade fever, mild leukocytosis, concerning for UTI.  ID was consulted and she was started on ceftriaxone and later cefepime.  Concerning for endocarditis, she underwent a transthoracic echocardiogram that showed no obvious vegetation.  She subsequently  underwent a transesophageal echocardiogram today that showed a tiny mobile area of the distal portion of the anterior mitral leaflet on the atrial side, representing early vegetation. On the two chamber view there appears to be at least two tiny mobile areas on the atrial side of the mitral valve which may represent small vegetations.  Per ID recommendations, she will undergo a 4-week course of ceftriaxone.  Cardiology has been consulted to assist with further management.    Upon my visit, she is alert and oriented.  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  She denies any chest pain, shortness of breath, palpitations, PND, orthopnea, presyncope, or lower extremity edema.  Her primary complaint is back and shoulder pain.  Upon exam, lungs are clear bilaterally, heart rate rhythm regular, no elevated JVD or leg edema.    She is afebrile, CBC and BMP are unremarkable.  Blood pressure is mildly elevated.    Past Medical History   Parkinson's, BPPV, UTI and cognitive impairment.       Prior to Admission Medications   Prior to Admission Medications   Prescriptions Last Dose Informant Patient Reported? Taking?   Multiple Vitamins-Minerals (HM MULTIVITAMIN ADULT GUMMY PO) 11/5/2022 at 0800  Yes Yes   Sig: Take 2 chew tab by mouth daily   NUPLAZID 34 MG capsule  at PRN  Yes Yes   Sig: Take 34 mg by mouth daily as needed (hallucinations)   QUEtiapine (SEROQUEL) 25 MG tablet Not Taking  No No   Sig: Take 0.5 tablets (12.5 mg) by mouth At Bedtime   Patient not taking: Reported on 11/6/2022   acetaminophen (TYLENOL) 500 MG tablet 11/5/2022  Yes Yes   Sig: Take 1,000 mg by mouth 3 times daily At 0800, 1400, 2000   carbidopa-levodopa (SINEMET CR)  MG CR tablet 11/4/2022 at 2300  Yes Yes   Sig: Take 1 tablet by mouth daily at 11pm.  Indications: Parkinson's Disease   carbidopa-levodopa (SINEMET)  MG tablet 11/5/2022  Yes Yes   Sig: Take 1.5 tablets by mouth 2 times daily  0800 and 1600   carbidopa-levodopa (SINEMET)  MG tablet 11/5/2022  Yes Yes   Sig: Take 2 tablets by mouth 2 times daily 1200 and 2000   cholecalciferol 50 MCG (2000 UT) tablet 11/5/2022 at 0800  Yes Yes   Sig: Take 2,000 Units by mouth daily   famotidine (PEPCID) 20 MG tablet 11/5/2022 at 0800  Yes Yes   Sig: Take 20 mg by mouth daily At 0800   glycopyrrolate (ROBINUL) 1 MG tablet 11/5/2022 at 0800  Yes Yes   Sig: Take 1 tablet by mouth every morning   ketoconazole (NIZORAL) 2 % external shampoo  at PRN  Yes Yes   Sig: Apply topically every 7 days   methocarbamol (ROBAXIN) 500 MG tablet  at PRN  Yes Yes   Sig: Take 250 mg by mouth daily as needed   polyvinyl alcohol (LIQUIFILM TEARS) 1.4 % ophthalmic solution  at PRN  Yes Yes   Sig: Place 1 drop into both eyes every 4 hours as needed   senna-docusate (SENOKOT-S/PERICOLACE) 8.6-50 MG tablet  at PRN  Yes Yes   Sig: Take 1 tablet by mouth daily as needed      Facility-Administered Medications: None     Current Facility-Administered Medications   Medication Dose Route Frequency     acetaminophen  1,000 mg Oral TID     carbidopa-levodopa  2 tablet Oral At Bedtime     carbidopa-levodopa  1 tablet Oral BID    And     carbidopa-levodopa  1 half-tab Oral BID     carbidopa-levodopa  2 tablet Oral BID     ceFEPIme  2 g Intravenous Q8H     enoxaparin ANTICOAGULANT  40 mg Subcutaneous Q24H     famotidine  20 mg Oral Daily     glycopyrrolate  1 mg Oral QAM     sodium chloride (PF)  3 mL Intracatheter Q8H     sodium chloride (PF)  3 mL Intravenous Q8H     vancomycin  1,000 mg Intravenous Q12H     Current Facility-Administered Medications   Medication Last Rate     - MEDICATION INSTRUCTIONS -       - MEDICATION INSTRUCTIONS -       sodium chloride       Allergies   Allergies   Allergen Reactions     Lisinopril Cough       Social History    reports that she has never smoked. She has never used smokeless tobacco.      Family History            Review of Systems   A  "comprehensive review of system was performed and is negative other than that noted in the HPI or here.     Physical Exam   Vital Signs with Ranges  Temp:  [97.7  F (36.5  C)-98.2  F (36.8  C)] 97.8  F (36.6  C)  Pulse:  [] 82  Resp:  [11-22] 16  BP: (119-174)/(67-89) 147/74  SpO2:  [92 %-100 %] 94 %  Wt Readings from Last 4 Encounters:   11/05/22 51.7 kg (114 lb)   01/12/22 51.7 kg (114 lb)   09/22/21 52.9 kg (116 lb 9.6 oz)     No intake/output data recorded.      Vitals: BP (!) 147/74 (BP Location: Left arm)   Pulse 82   Temp 97.8  F (36.6  C) (Oral)   Resp 16   Ht 1.549 m (5' 0.98\")   Wt 51.7 kg (114 lb)   SpO2 94%   BMI 21.55 kg/m      Physical Exam:   General - Alert and oriented to time place and person in no acute distress  Eyes - No scleral icterus  HEENT - Neck supple, moist mucous membranes  Cardiovascular -regular rate and rhythm, no audible murmur  Extremities - There is no edema  Respiratory -clear bilaterally  Skin - No pallor or cyanosis  Gastrointestinal - Non tender and non distended without rebound or guarding  Psych - Appropriate affect   Neurological - No gross motor neurological focal deficits.  Fatigued      Recent Labs   Lab 11/11/22  0539 11/10/22  2243 11/10/22  1024 11/09/22  0643 11/08/22  0705 11/07/22  1331 11/07/22  0541 11/05/22  2154 11/05/22  1145   WBC  --   --   --   --  8.2  --  6.7 10.9 13.9*   HGB  --   --   --   --  12.5  --  11.4* 11.7 13.1   MCV  --   --   --   --  107*  --  107* 107* 106*   PLT  --   --   --   --  339  --  304 274 300   INR  --   --   --   --   --   --   --   --  1.21*   NA  --   --   --   --  141  --  139 138 138   POTASSIUM 3.8  --  3.9 4.1 4.0   < > 3.3* 3.6 4.0   CHLORIDE  --   --   --   --  103  --  103 102 99   CO2  --   --   --   --  30*  --  28 27 27   BUN  --   --   --   --  10.4  --  7.4* 12.8 15.3   CR 0.51  --   --   --  0.53  --  0.44* 0.47* 0.51   GFRESTIMATED >90  --   --   --  >90  --  >90 >90 >90   ANIONGAP  --   --   --   --  8 "  --  8 9 12   GIOVANI  --   --   --   --  9.5  --  8.7* 9.4 9.8   GLC  --  134*  --   --  110*  --  109* 119* 118*    < > = values in this interval not displayed.       Recent Labs   Lab 22  2154   WBC 8.2 6.7 10.9   HGB 12.5 11.4* 11.7   HCT 39.6 35.5 36.8   * 107* 107*    304 274     Recent Labs   Lab 22  2154    304 274     No results for input(s): TSH in the last 168 hours.  Recent Labs   Lab 22   COLOR Light Yellow   APPEARANCE Clear   URINEGLC Negative   URINEBILI Negative   URINEKETONE 10*   SG 1.014   UBLD Small*   URINEPH 6.5   PROTEIN 20*   NITRITE Negative   LEUKEST Negative   RBCU 5*   WBCU 3       Imaging:  Recent Results (from the past 48 hour(s))   Transesophageal Echocardiogram   Result Value    LVEF  75-80%    [facility]    [id number]  [id number]  [id number]  [id number]^JESE^S     [facility]  Echocardiography Laboratory  [address] [place] [address]     Name: BINH JUAREZ  MRN: [id number]  : 1945  Study Date: 11/10/2022 02:01 PM  Age: 77 yrs  Gender: Female  Patient Location: [facility]  Reason For Study: Endocarditis  Ordering Physician: JESE WELCH  Performed By: Ericka Wiseman     BSA: 1.5 m2  Height: 60 in  Weight: 114 lb  HR: 93  BP: 132/68 mmHg  ______________________________________________________________________________  Procedure  Complete LEN Adult. LEN Probe serial #[id number] ([id number]) was used during the  procedure. The heart rate, respiratory rate and response to care were  monitored throughout the procedure with the assistance of the nurse.  ______________________________________________________________________________  Interpretation Summary     There is a tiny mobile area on the distal portion of the anterior leaflet on  the atrial side. This could represent an early vegetation. On the two chamber  view there appears to be at least two tiny mobile  areas on the atrial side of  the mitral valve which may represent small vegetations.  ______________________________________________________________________________  LEN  I determined this patient to be an appropriate candidate for the planned  sedation and procedure and have reassessed the patient immediately prior to  sedation and procedure. Total sedation time: 20 minutes of continuous bedside  1:1 monitoring. Versed (3mg) was given intravenously. Fentanyl (25mcg) was  given intravenously. Consent to the procedure was obtained prior to sedation.  Prior to the exam, the oral cavity was checked and no overcrowding was noted.  The transesophageal probe was passed without difficulty. There were no  complications associated with this procedure.     Left Ventricle  The left ventricle is normal in size. Hyperdynamic left ventricular function.  The visual ejection fraction is estimated at 75-80%.     Right Ventricle  The right ventricle is normal in size and function.     Atria  The left atrium is mildly dilated. Spontaneous contrast in left atrium. No  left atrial mass or thrombus visualized. Right atrial size is normal. No  evidence of right atrial mass/thrombus. There is no color Doppler evidence of  an atrial shunt. A contrast injection (Bubble Study) was performed that was  mildly positive for flow across the interatrial septum. A contrast injection  (Bubble Study) was performed that was positive late after the injection. No  evidence of PFO but occasional bubbles appear late consistent with a small  peripheral A-V connection. The left atrial appendage was well visualized and  free of thrombus.     Mitral Valve  There is a tiny mobile area on the distal portion of the anterior leaflet on  the atrial side. This could represent an early vegetation. On the two chamber  view there appears to be at least tow tiny mobile areas on the atrial side of  the mitral valve which may represent small vegetations. There is trace  mitral  regurgitation.     Tricuspid Valve  There is no vegetation on the tricuspid valve. There is trace tricuspid  regurgitation.     Aortic Valve  The aortic valve is trileaflet. There is trivial trileaflet aortic sclerosis.  Lambdl's excrescences noted. There is no vegetation on the aortic valve. No  aortic regurgitation is present. No hemodynamically significant valvular  aortic stenosis.     Pulmonic Valve  There is no vegetation on the pulmonic valve. There is trace pulmonic valvular  regurgitation. Normal pulmonic valve velocity.     Vessels  The aortic root is normal size. (3.5 cm). Normal size ascending aorta. (3.3  cm). Mild atherosclerotic plaque(s) in the descending aorta. Normal pulmonary  venous drainage.     Pericardial/Pleural  There is no pericardial effusion.     Rhythm  Sinus rhythm was noted.  ______________________________________________________________________________  ______________________________________________________________________________  Tiny mass on atrial side of the mitral valve.     Report approved by: Matt Chandra 11/10/2022 03:02 PM               Clinically Significant Risk Factors Present on Admission                 Neurology: Dementia: Unspecified dementia without behavioral disturbance

## 2022-11-11 NOTE — PROCEDURES
[facility]    Single Lumen Midline Placement    Date/Time: 11/11/2022 3:12 PM  Performed by: Mamie Peralta RN  Authorized by: Dangelo Rojas MD   Indications: vascular access      UNIVERSAL PROTOCOL   Site Marked: Yes  Prior Images Obtained and Reviewed:  Yes  Required items: Required blood products, implants, devices and special equipment available    Patient identity confirmed:  Verbally with patient  NA - No sedation, light sedation, or local anesthesia  Confirmation Checklist:  Patient's identity using two indicators, relevant allergies, procedure was appropriate and matched the consent or emergent situation and correct equipment/implants were available  Time out: Immediately prior to the procedure a time out was called    Universal Protocol: the Joint Commission Universal Protocol was followed    Preparation: Patient was prepped and draped in usual sterile fashion       ANESTHESIA    Anesthesia: Local infiltration  Local Anesthetic:  Lidocaine 1% without epinephrine  Anesthetic Total (mL):  3      SEDATION    Patient Sedated: No        Preparation: skin prepped with ChloraPrep  Skin prep agent: skin prep agent completely dried prior to procedure  Sterile barriers: maximum sterile barriers were used: cap, mask, sterile gown, sterile gloves, and large sterile sheet  Hand hygiene: hand hygiene performed prior to central venous catheter insertion  Type of line used: Midline  Catheter type: single lumen  Lumen type: non-valved  Catheter size: 4 Fr  Brand: Bard  Lot number: [id number]  Placement method: venipuncture and ultrasound  Number of attempts: 1  Difficulty threading catheter: no  Successful placement: yes  Orientation: right    Location: basilic vein (.65 cm diameter)  Tip Location: distal to axillary vein  Arm circumference: adults 15 cm  Extremity circumference: 23  Visible catheter length: 0  Internal length: 10 cm  Total catheter length: 10  Dressing and securement:  adhesive securement device, blood cleaned with CHG, chlorhexidine disc applied and sterile dressing applied  Post procedure assessment: blood return through all ports and free fluid flow  PROCEDURE Describe Procedure: Okay to use Midline. Joanna NORIEGA advised.

## 2022-11-11 NOTE — CONSULTS
Care Management Discharge Note    Discharge Date: 11/11/2022     Discharge Disposition: Transitional Care    Discharge Services: None    Discharge DME: None    Discharge Transportation:  [facility] at 10am on 11/12    Private pay costs discussed: private room/amenity fees and transportation costs    PAS Confirmation Code:  ([id number])  Patient/family educated on Medicare website which has current facility and service quality ratings:  Yes    Education Provided on the Discharge Plan:  Yes   Persons Notified of Discharge Plans: Pt at bedside and dtr Maye via phone   Patient/Family in Agreement with the Plan: yes    Additional Information:  CM following discharging planning, per discussion with provider pt will be medically ready for discharge to TCU on 11/12. [facility] was unable to hold bed for pt. [facility] is able to accept to a private room with a $46/day fee.     Reviewed out of pocket cost for [facility] transport, $81.80 for base rate and $5.26 per mile to the destination. Spoke with pt and dtr, they expressed understanding and are agreeable to this. WC ride arranged for 10 am on 11/12.    LM updating TCU. Updated provider, bedside RN, pt and dtr Maye.     Kendra Hernandez RN BSN   Inpatient Care Coordination  [facility]   Phone [phone]

## 2022-11-11 NOTE — PROGRESS NOTES
[facility]  Infectious Disease Progress Note          Assessment and Plan:   IMPRESSION:    1.  A 77-year-old female, vague deterioration at her acute living facility, she has underlying Parkinson's, mild cognitive dysfunction and has had recent falls, sense of not being well until, some low-grade fever at admission.  Etiology not entirely clear, but now #2 below possible defining this illness.   2.  2/2 blood cultures from admission positive for Strep viridans, oralis subspecies, this is unlikely to be a contaminant and when in blood cultures and this high-grade fashion, the most common diagnosis is subacute bacterial endocarditis, conceivably could occur with aspiration, but no obvious aspiration pneumonia present.  3.  Minimally abnormal UA.  Some symptoms when prompted, but not volunteered, positive urine culture for pseudomonas probably not significant.  4.  Parkinson's disease with chronic mentation issues.  5.  Some notation of possible aspiration, but no clinical evidence of active pneumonia.     RECOMMENDATIONS:    1.  This is a significant blood culture, 2/2 positive for Strep oralis implies true bacteremia and as such, probable endocarditis.    Difficult to interpret transthoracic echocardiogram , transesophageal echocardiogram while minor almost certainly has SBE combing clinical/micro and LEN  2.  midline, 3 more weeks ceftriaxone , all orders in, soc sx work on disposition        Interval History:   no new complaints and doing well; no cp, sob, n/v/d, or abd pain.  Feels significantly better, no focal pain symptoms no ongoing urinary symptoms, temp down culture same as previously pansensitive organismTEE MV noted fct OK              Medications:       acetaminophen  1,000 mg Oral TID     carbidopa-levodopa  2 tablet Oral At Bedtime     carbidopa-levodopa  1 tablet Oral BID    And     carbidopa-levodopa  1 half-tab Oral BID     carbidopa-levodopa  2 tablet Oral BID      "cefTRIAXone  2 g Intravenous Q24H     enoxaparin ANTICOAGULANT  40 mg Subcutaneous Q24H     famotidine  20 mg Oral Daily     glycopyrrolate  1 mg Oral QAM     sodium chloride (PF)  3 mL Intracatheter Q8H     sodium chloride (PF)  3 mL Intravenous Q8H                  Physical Exam:   Blood pressure (!) 147/74, pulse 82, temperature 97.8  F (36.6  C), temperature source Oral, resp. rate 16, height 1.549 m (5' 0.98\"), weight 51.7 kg (114 lb), SpO2 94 %.  Wt Readings from Last 2 Encounters:   11/05/22 51.7 kg (114 lb)   01/12/22 51.7 kg (114 lb)     Vital Signs with Ranges  Temp:  [97.7  F (36.5  C)-98.2  F (36.8  C)] 97.8  F (36.6  C)  Pulse:  [] 82  Resp:  [11-22] 16  BP: (119-174)/(67-89) 147/74  SpO2:  [92 %-100 %] 94 %    Constitutional: Awake, alert, cooperative, no apparent distress   Lungs: Clear to auscultation bilaterally, no crackles or wheezing   Cardiovascular: Regular rate and rhythm, normal S1 and S2, and no murmur noted   Abdomen: Normal bowel sounds, soft, non-distended, non-tender   Skin: No rashes, no cyanosis, no edema   Other:           Data:   All microbiology laboratory data reviewed.  Recent Labs   Lab Test 11/08/22  0705 11/07/22  0541 11/05/22  2154   WBC 8.2 6.7 10.9   HGB 12.5 11.4* 11.7   HCT 39.6 35.5 36.8   * 107* 107*    304 274     Recent Labs   Lab Test 11/11/22  0539 11/08/22  0705 11/07/22  0541   CR 0.51 0.53 0.44*     No lab results found.  No lab results found.    Invalid input(s):     "

## 2022-11-11 NOTE — PLAN OF CARE
"Care from [id number]    Inpatient Progress Note:  For complete assessment see flow sheet documentation.    /79 (BP Location: Left arm)   Pulse 78   Temp 98.2  F (36.8  C) (Oral)   Resp 16   Ht 1.549 m (5' 0.98\")   Wt 51.7 kg (114 lb)   SpO2 94%   BMI 21.55 kg/m         Orientation: Alert & Oriented x3  Neuro: Some confusion  Pain status: Denies  Activity: Ax1 with walker/gait belt  Peripheral edema:   Resp: Lung sounds clear.  Cardiac: WDL  GI: BS active  :  Occasional incontinence  Skin:Staples  posterior scalp. Laceration on R eye   LDA:PIV SL   Infusions:cefepime for UTI. Vanco for + BC  Pertinent Labs:Mag+K+   Diet: reglular  Consults: SW,PT,OT,ID  Discharge Plan: [facility] 11/11 ( possibly)  Pt. Alert  and oriented x3 with some confusion. VSS. Denies pain. On Vanco and Cefemine,IV saline locked when not in use. Tolerated warm liquids, however declined dinner sating \" Aditya not hungry\". BS checks 134 .Plans to discharge to  TCU in progress.    Will continue to monitor and provide cares.     Asmita Guy RN  "

## 2022-11-11 NOTE — CARE PLAN
"  Inpatient Progress Note: 0700-1900    Primary diagnosis:  Recurrent Falls    Vitals: BP (!) 147/74 (BP Location: Left arm)   Pulse 82   Temp 97.8  F (36.6  C) (Oral)   Resp 16   Ht 1.549 m (5' 0.98\")   Wt 51.7 kg (114 lb)   SpO2 94%   BMI 21.55 kg/m      Neuro: Alert& Oriented, forgetful   Card: Regular rate and rhyme   Resp: Denies SBO, lungs are clear on asculation  GI: Pt had BM, active bowel sounds  :  Dribble, void without difficulty   Skin: Head/ face laceration  Activity: X1 with walker/GB  Diet: Regular   PIV: SL  Pain: Back pain, shoulder pt received cruz. Tylenol   Plan: IV antibiotic. Medline is placed, ID is following. PT rec TCU. Pt is discharging to [facility] 11/12.       "

## 2022-11-12 VITALS
TEMPERATURE: 98.6 F | SYSTOLIC BLOOD PRESSURE: 135 MMHG | WEIGHT: 114 LBS | HEART RATE: 83 BPM | HEIGHT: 61 IN | BODY MASS INDEX: 21.52 KG/M2 | DIASTOLIC BLOOD PRESSURE: 74 MMHG | RESPIRATION RATE: 18 BRPM | OXYGEN SATURATION: 95 %

## 2022-11-12 LAB
BACTERIA BLD CULT: NO GROWTH
BACTERIA BLD CULT: NO GROWTH
PLATELET # BLD AUTO: 502 10E3/UL (ref 150–450)

## 2022-11-12 PROCEDURE — 99239 HOSP IP/OBS DSCHRG MGMT >30: CPT | Performed by: PHYSICIAN ASSISTANT

## 2022-11-12 PROCEDURE — 250N000013 HC RX MED GY IP 250 OP 250 PS 637: Performed by: HOSPITALIST

## 2022-11-12 PROCEDURE — 85049 AUTOMATED PLATELET COUNT: CPT | Performed by: EMERGENCY MEDICINE

## 2022-11-12 PROCEDURE — 36415 COLL VENOUS BLD VENIPUNCTURE: CPT | Performed by: EMERGENCY MEDICINE

## 2022-11-12 PROCEDURE — 250N000013 HC RX MED GY IP 250 OP 250 PS 637: Performed by: PHYSICIAN ASSISTANT

## 2022-11-12 PROCEDURE — 250N000011 HC RX IP 250 OP 636: Performed by: INTERNAL MEDICINE

## 2022-11-12 RX ORDER — QUETIAPINE FUMARATE 25 MG/1
12.5 TABLET, FILM COATED ORAL
DISCHARGE
Start: 2022-11-12 | End: 2022-12-07

## 2022-11-12 RX ORDER — CARBIDOPA AND LEVODOPA 25; 100 MG/1; MG/1
2 TABLET, EXTENDED RELEASE ORAL AT BEDTIME
DISCHARGE
Start: 2022-11-12

## 2022-11-12 RX ORDER — CARBIDOPA AND LEVODOPA 25; 100 MG/1; MG/1
2 TABLET ORAL 2 TIMES DAILY
DISCHARGE
Start: 2022-11-12

## 2022-11-12 RX ORDER — POLYVINYL ALCOHOL 14 MG/ML
1 SOLUTION/ DROPS OPHTHALMIC EVERY 4 HOURS PRN
DISCHARGE
Start: 2022-11-12

## 2022-11-12 RX ORDER — AMOXICILLIN 250 MG
1 CAPSULE ORAL DAILY PRN
DISCHARGE
Start: 2022-11-12

## 2022-11-12 RX ORDER — CARBIDOPA AND LEVODOPA 25; 100 MG/1; MG/1
1.5 TABLET ORAL 2 TIMES DAILY
DISCHARGE
Start: 2022-11-12

## 2022-11-12 RX ORDER — GLYCOPYRROLATE 1 MG/1
1 TABLET ORAL EVERY MORNING
DISCHARGE
Start: 2022-11-12

## 2022-11-12 RX ORDER — FAMOTIDINE 20 MG/1
20 TABLET, FILM COATED ORAL DAILY
DISCHARGE
Start: 2022-11-12

## 2022-11-12 RX ORDER — METHOCARBAMOL 500 MG/1
250 TABLET, FILM COATED ORAL 3 TIMES DAILY PRN
DISCHARGE
Start: 2022-11-12

## 2022-11-12 RX ORDER — ACETAMINOPHEN 500 MG
1000 TABLET ORAL 3 TIMES DAILY
DISCHARGE
Start: 2022-11-12

## 2022-11-12 RX ORDER — ENOXAPARIN SODIUM 100 MG/ML
40 INJECTION SUBCUTANEOUS EVERY 24 HOURS
DISCHARGE
Start: 2022-11-12 | End: 2022-11-23

## 2022-11-12 RX ADMIN — CARBIDOPA AND LEVODOPA 1 TABLET: 25; 100 TABLET ORAL at 08:48

## 2022-11-12 RX ADMIN — CEFTRIAXONE 2 G: 2 INJECTION, POWDER, FOR SOLUTION INTRAMUSCULAR; INTRAVENOUS at 08:39

## 2022-11-12 RX ADMIN — CARBIDOPA AND LEVODOPA 1 HALF-TAB: 25; 100 TABLET ORAL at 08:48

## 2022-11-12 RX ADMIN — ACETAMINOPHEN 1000 MG: 500 TABLET, FILM COATED ORAL at 06:01

## 2022-11-12 RX ADMIN — GLYCOPYRROLATE 1 MG: 1 TABLET ORAL at 08:51

## 2022-11-12 RX ADMIN — FAMOTIDINE 20 MG: 20 TABLET ORAL at 08:48

## 2022-11-12 ASSESSMENT — ACTIVITIES OF DAILY LIVING (ADL)
ADLS_ACUITY_SCORE: 41
ADLS_ACUITY_SCORE: 41
ADLS_ACUITY_SCORE: 42

## 2022-11-12 NOTE — DISCHARGE SUMMARY
[facility] Discharge Summary          Pablo Baez MRN# [id number]   Age: 77 year old YOB: 1945     Date of Admission:  11/5/2022  Date of Discharge::  11/12/2022  Admitting Physician:  Nino Valdes MD  Discharge Physician:  Deborah De Leon PA-C  Primary Physician: Lizzette Cortés     Primary Discharge Diagnoses:   Pseudomonas UTI  Streptococcus Oralis Bacteremia  Endocarditis     Hospital Course:   For detail history, please refer to H & P from 11/5/2022. In brief, this is a 77 year old female w/PMH parkinsons disease, UTI, BPPV, cognitive impairment who presents from her long-term with recurrent falls.        Pseudomonas UTI  Streptococcus Oralis Bacteremia  Endocarditis - pt presented to the ED with recurrent falls and noted to have a temp of 100.1.  Wbc on 11/5 13.9 then improved on 10.9 on repeat ED evaluation. Influeza/RSV/COVID negative.  CXR negative.  UA with no clear sign of infection on admission, but started on Ceftriaxone empirically.   - urine culture growing Pseudomonas Aeruginosa, treated with IV Cefepime while hospitalized  - 11/6 blood cultures 2/2 growing streptococcus oralis  - started on Vancomycin on 11/6, discontinued on 11/11  - repeat blood cultures have been NGTD  - echocardiogram 11/9 revealed Valves are thickened with no obvious vegetations.  - LEN performed on 11/10 revealed mitral valve with likely small vegetations  - ID consulted and a midline was placed and patient was discharged to complete a 3 week course of IV Ceftriaxone  - Cardiology was consulted and agreed with IV abx and patient instructed follow up with echocardiogram after antibiotic course is completed and follow up in Cardiology clinic in 4-6 weeks      Recurrent Falls  Close Head Injury  Hx Parkinson's Disease with Gait Instability - pt presents from [facility] with frequent falls.  Likely due to acute infection.  Telemetry unremarkable.  Orthostatics negative, but checked after IVF  hydration.    Seen in the ED on 11/1 with laceration repair to the right scalp with staples and right eyebrow repair with sutures.  CT head with scalp hematomas and c/s was negative and discharge back to nursing home.    Seen in the ED 11/5 am after a mechanical fall with laceration to her left parietal scalp with 2 staples placed and discharge back to her nursing home.  CT head and C/S with no acute abnormality.  Patient returned to the ED again on 11/5 later in the day at her nursing home with another fall with generalized weakness and found to have a low grade fever of 100.1.  A Laceration of the left posterior scalp was repaired with staples.  - PT consulted and recommend TCU  - SW consult for assistance and patient was discharged to [facility]   - right eyebrow sutures removed on 11/9   - right scalp staples were removed on 11/11  - left scalp staples to be removed around 11/15     Parkinson's Disease  Cognitive Impairment - parkinson's disease diagnosed 10/2018.  Followed by Neurology through .  Has baseline significant gait instability, orofacial dyskinesia and hallucinations.  SLUMS score 1/2022 was 21/30.  - continue pta Sinemet, Robinul and prn Seroquel.      Troponin and CK elevation - minimally elevated, likely related to falls.  Trop flat.  No chest pain.  CK normalized after IVF hydration.     Hx Chronic Neck Pain - h/o cervical spondylosis s/p cervical fusion.  CT c/s with no acute abnormalities.    - continue pta scheduled Tylenol and pta Robaxin prn was increased to tid for suspected muscle strain related to her falls     Hx Cdiff - hx of cdiff in 2021.  No recent diarrhea.  Discussed with ID and no prophylactic vancomycin is needed.       Procedures/Imaging:     Results for orders placed or performed during the hospital encounter of 11/05/22   CT Head w/o Contrast    Narrative    EXAM: CT HEAD W/O CONTRAST  LOCATION: [facility]  DATE/TIME: 11/5/2022 10:42 PM    INDICATION: Fall, head injury,  neck injury.  COMPARISON: CT head 11/05/2022 12:43 PM  TECHNIQUE: Routine CT Head without IV contrast. Multiplanar reformats. Dose reduction techniques were used.    FINDINGS:  INTRACRANIAL CONTENTS: No intracranial hemorrhage, extraaxial collection, or mass effect.  No CT evidence of acute infarct. Mild presumed chronic small vessel ischemic changes. Mild generalized volume loss. No hydrocephalus.     VISUALIZED ORBITS/SINUSES/MASTOIDS: No intraorbital abnormality. No paranasal sinus mucosal disease. No middle ear or mastoid effusion.    BONES/SOFT TISSUES: Surgical staples right parietal region.      Impression    IMPRESSION:  1.  No acute intracranial process.   CT Cervical Spine w/o Contrast    Narrative    EXAM: CT CERVICAL SPINE W/O CONTRAST  LOCATION: [facility] [place]  DATE/TIME: 11/5/2022 10:43 PM    INDICATION: Fall, head injury, neck injury, neck pain  COMPARISON: 11/05/2020 02/12/1944.  TECHNIQUE: Routine CT Cervical Spine without IV contrast. Multiplanar reformats. Dose reduction techniques were used.    FINDINGS:  VERTEBRA: Normal vertebral body heights and alignment. No fracture or posttraumatic subluxation. Status post ACDF C3-C6 with corpectomy of C4. No evidence of hardware failure.    CANAL/FORAMINA: Moderate to severe central canal stenosis at C2-C3 with moderate severe bilateral foraminal stenosis. At C6-C7 there is moderate central canal stenosis.    PARASPINAL: Slightly prominent prevertebral soft issues at C6-C7 unchanged from prior CT done earlier today.      Impression    IMPRESSION:  1.  No fracture or posttraumatic subluxation.     XR Chest 2 Views    Narrative    EXAM: XR CHEST 2 VIEWS  LOCATION: [facility]  DATE/TIME: 11/6/2022 4:24 PM    INDICATION: fever work up  COMPARISON: 1/12/2022      Impression    IMPRESSION: No pneumothorax or pleural effusion. No focal consolidation. Cardiac silhouette within normal tortuous atherosclerotic aorta.    Echocardiogram Complete     Value    LVEF  60-65%    Located within [facility]    [id number]  [id number]  [id number]  [id number]^JOANNA^SAPPHIRE^CRISTIAN     [facility]  Echocardiography Laboratory  [address] [place] [address]     Name: BINH JUAREZ  MRN: [id number]  : 1945  Study Date: 2022 10:02 AM  Age: 77 yrs  Gender: Female  Patient Location: [facility]  Reason For Study: Endocarditis  Ordering Physician: SAPPHIRE MARSHALL  Referring Physician: Lizzette Cortés  Performed By: Reynaldo Ortega RDCS     BSA: 1.5 m2  Height: 61 in  Weight: 114 lb  HR: 87  BP: 127/68 mmHg  ______________________________________________________________________________  Procedure  Complete Portable Echo Adult.  ______________________________________________________________________________  Interpretation Summary     Left ventricular systolic function is normal.  The visual ejection fraction is 60-65%.  No regional wall motion abnormalities noted.  No obvious vegetations noted within limitations of a transthoracic study.  Valves are thickened. Consider LEN if clinical suspicion of endocarditis is  high. The study was technically difficult. There is no comparison study  available.  ______________________________________________________________________________  Left Ventricle  The left ventricle is normal in size. There is normal left ventricular wall  thickness. Left ventricular systolic function is normal. The visual ejection  fraction is 60-65%. Grade I or early diastolic dysfunction. No regional wall  motion abnormalities noted.     Right Ventricle  The right ventricle is normal size. The right ventricular systolic function is  normal.     Atria  The left atrium is moderately dilated. Right atrial size is normal.     Mitral Valve  There is mild mitral annular calcification. There is trace mitral  regurgitation.     Tricuspid Valve  Tricuspid leaflets are thickened. There is mild (1+) tricuspid regurgitation.  The right  ventricular systolic pressure is approximated at 32.8 mmHg plus the  right atrial pressure. IVC diameter <2.1 cm collapsing >50% with sniff  suggests a normal RA pressure of 3 mmHg.     Aortic Valve  There is mild trileaflet aortic sclerosis. No aortic stenosis is present.     Pulmonic Valve  The pulmonic valve is not well visualized. There is mild (1+) pulmonic  valvular regurgitation.     Vessels  The aortic root is normal size.     Pericardium  Trivial pericardial effusion.     Rhythm  Sinus rhythm was noted.  ______________________________________________________________________________  MMode/2D Measurements & Calculations  IVSd: 0.88 cm     LVIDd: 4.5 cm  LVIDs: 2.6 cm  LVPWd: 1.0 cm  FS: 42.4 %  LV mass(C)d: 141.1 grams  LV mass(C)dI: 94.9 grams/m2  Ao root diam: 3.2 cm  asc Aorta Diam: 3.2 cm  LVOT diam: 1.8 cm  LVOT area: 2.5 cm2  LA Volume (BP): 65.1 ml  LA Volume Index (BP): 43.7 ml/m2  RWT: 0.46     Doppler Measurements & Calculations  MV E max dixie: 73.9 cm/sec  MV A max dixie: 102.0 cm/sec  MV E/A: 0.72  MV max P.6 mmHg  MV mean P.2 mmHg  MV V2 VTI: 23.6 cm  MVA(VTI): 2.7 cm2  MV dec time: 0.23 sec  LV V1 max P.6 mmHg  LV V1 max: 155.0 cm/sec  LV V1 VTI: 26.3 cm  SV(LVOT): 64.6 ml  SI(LVOT): 43.4 ml/m2  TR max dixie: 286.4 cm/sec  TR max P.8 mmHg  E/E' av.5  Lateral E/e': 8.6  Medial E/e': 10.5     ______________________________________________________________________________  Report approved by: Matt Duvall 2022 12:03 PM         Transesophageal Echocardiogram     Value    LVEF  75-80%    Narrative    [id number]  [id number]  [id number]  [id number]^REBEKAH^JESE^S     [facility]  Echocardiography Laboratory  [address] [place] [address]     Name: BINH JUAREZ  MRN: [id number]  : 1945  Study Date: 11/10/2022 02:01 PM  Age: 77 yrs  Gender: Female  Patient Location: [facility]  Reason For Study: Endocarditis  Ordering Physician: JESE WELCH  S  Performed By: Ericka Wiseman     BSA: 1.5 m2  Height: 60 in  Weight: 114 lb  HR: 93  BP: 132/68 mmHg  ______________________________________________________________________________  Procedure  Complete LEN Adult. LEN Probe serial #[id number] (R) was used during the  procedure. The heart rate, respiratory rate and response to care were  monitored throughout the procedure with the assistance of the nurse.  ______________________________________________________________________________  Interpretation Summary     There is a tiny mobile area on the distal portion of the anterior leaflet on  the atrial side. This could represent an early vegetation. On the two chamber  view there appears to be at least two tiny mobile areas on the atrial side of  the mitral valve which may represent small vegetations.  ______________________________________________________________________________  LEN  I determined this patient to be an appropriate candidate for the planned  sedation and procedure and have reassessed the patient immediately prior to  sedation and procedure. Total sedation time: 20 minutes of continuous bedside  1:1 monitoring. Versed (3mg) was given intravenously. Fentanyl (25mcg) was  given intravenously. Consent to the procedure was obtained prior to sedation.  Prior to the exam, the oral cavity was checked and no overcrowding was noted.  The transesophageal probe was passed without difficulty. There were no  complications associated with this procedure.     Left Ventricle  The left ventricle is normal in size. Hyperdynamic left ventricular function.  The visual ejection fraction is estimated at 75-80%.     Right Ventricle  The right ventricle is normal in size and function.     Atria  The left atrium is mildly dilated. Spontaneous contrast in left atrium. No  left atrial mass or thrombus visualized. Right atrial size is normal. No  evidence of right atrial mass/thrombus. There is no color Doppler evidence of  an atrial  shunt. A contrast injection (Bubble Study) was performed that was  mildly positive for flow across the interatrial septum. A contrast injection  (Bubble Study) was performed that was positive late after the injection. No  evidence of PFO but occasional bubbles appear late consistent with a small  peripheral A-V connection. The left atrial appendage was well visualized and  free of thrombus.     Mitral Valve  There is a tiny mobile area on the distal portion of the anterior leaflet on  the atrial side. This could represent an early vegetation. On the two chamber  view there appears to be at least tow tiny mobile areas on the atrial side of  the mitral valve which may represent small vegetations. There is trace mitral  regurgitation.     Tricuspid Valve  There is no vegetation on the tricuspid valve. There is trace tricuspid  regurgitation.     Aortic Valve  The aortic valve is trileaflet. There is trivial trileaflet aortic sclerosis.  Lambdl's excrescences noted. There is no vegetation on the aortic valve. No  aortic regurgitation is present. No hemodynamically significant valvular  aortic stenosis.     Pulmonic Valve  There is no vegetation on the pulmonic valve. There is trace pulmonic valvular  regurgitation. Normal pulmonic valve velocity.     Vessels  The aortic root is normal size. (3.5 cm). Normal size ascending aorta. (3.3  cm). Mild atherosclerotic plaque(s) in the descending aorta. Normal pulmonary  venous drainage.     Pericardial/Pleural  There is no pericardial effusion.     Rhythm  Sinus rhythm was noted.  ______________________________________________________________________________  ______________________________________________________________________________  Tiny mass on atrial side of the mitral valve.     Report approved by: Matt Chandra 11/10/2022 03:02 PM           Allergies:     Allergies   Allergen Reactions     Lisinopril Cough        Subjective:   Patient reports she feels  "improved since admission.  Neck pain and upper back pain have improved daily..  Denies any chest pain, shortness of breath, abdominal pain.  Tolerating po.  Denies any hallucinations      Physical Exam:   Blood pressure 129/65, pulse 79, temperature 97.4  F (36.3  C), temperature source Oral, resp. rate 16, height 1.549 m (5' 0.98\"), weight 51.7 kg (114 lb), SpO2 96 %.  General: Alert, interactive, NAD  HEENT: sclera anicteric, PERRL, right eye ecchymosis almost nearly resolved, right eyebrow injury is well healed.  Right scalp injury is well healed. Staples to head x2 areas on the left scalp from recent falls  Resp: clear to auscultation bilaterally, no crackles or wheezes  Cardiac: regular rate and rhythm, no murmur  Abdomen: Soft, nontender, nondistended. +BS.  No HSM or masses, no rebound or guarding.  Extremities: No LE edema  Skin: Warm and dry  Neuro: Alert & oriented x 3, moves all extremities equally   Discharge Medicatios:        Discharge Medication List as of 11/12/2022  9:44 AM      START taking these medications    Details   cefTRIAXone (ROCEPHIN) 1 GM vial Inject 2 g (2,000 mg) into the vein daily for 21 days ESR,CRP,CBC with differential, creatinine, SGOT weekly while on this medication to be faxed to Dr. Rojas office., Disp-600 mL, R-0, Infusion Therapy      enoxaparin ANTICOAGULANT (LOVENOX) 40 MG/0.4ML syringe Inject 0.4 mLs (40 mg) Subcutaneous every 24 hours TCU provider to discontinue when mobility improves, Transitional         CONTINUE these medications which have CHANGED    Details   acetaminophen (TYLENOL) 500 MG tablet Take 2 tablets (1,000 mg) by mouth 3 times daily At 0800, 1400, 2000, Transitional      carbidopa-levodopa (SINEMET CR)  MG CR tablet Take 2 tablets by mouth At Bedtime, Transitional      !! carbidopa-levodopa (SINEMET)  MG tablet Take 2 tablets by mouth 2 times daily At 12pm and 8pm, Transitional      !! carbidopa-levodopa (SINEMET)  MG tablet Take 1.5 " tablets by mouth 2 times daily 0800 and 1600, Transitional      cholecalciferol 50 MCG (2000 UT) tablet Take 1 tablet (50 mcg) by mouth daily, Transitional      famotidine (PEPCID) 20 MG tablet Take 1 tablet (20 mg) by mouth daily At 0800, Transitional      glycopyrrolate (ROBINUL) 1 MG tablet Take 1 tablet (1 mg) by mouth every morning, Transitional      methocarbamol (ROBAXIN) 500 MG tablet Take 0.5 tablets (250 mg) by mouth 3 times daily as needed for muscle spasms, Transitional      Multiple Vitamins-Minerals (HM MULTIVITAMIN ADULT GUMMY) CHEW Take 1 chew tab by mouth daily, Transitional      polyvinyl alcohol (LIQUIFILM TEARS) 1.4 % ophthalmic solution Place 1 drop into both eyes every 4 hours as needed for dry eyes, Transitional      QUEtiapine (SEROQUEL) 25 MG tablet Take 0.5 tablets (12.5 mg) by mouth nightly as needed (agitation), Transitional      senna-docusate (SENOKOT-S/PERICOLACE) 8.6-50 MG tablet Take 1 tablet by mouth daily as needed, Transitional       !! - Potential duplicate medications found. Please discuss with provider.      STOP taking these medications       carbidopa-levodopa (SINEMET CR)  MG CR tablet Comments:   Reason for Stopping:         ketoconazole (NIZORAL) 2 % external shampoo Comments:   Reason for Stopping:         NUPLAZID 34 MG capsule Comments:   Reason for Stopping:               Instructions Given to Patient as Discharge:     Discharge Procedure Orders   Follow-Up with Cardiology HIREN   Standing Status: Future   Referral Priority: Routine: Next available opening Referral Type: Consultation   Requested Specialty: Cardiovascular Disease   Number of Visits Requested: 1     General info for SNF   Order Comments: Length of Stay Estimate: Short Term Care: Estimated # of Days <30  Condition at Discharge: Stable  Level of care:skilled   Rehabilitation Potential: Good  Admission H&P remains valid and up-to-date: Yes  Recent Chemotherapy: N/A  Use Nursing Home Standing Orders: Yes      Mantoux instructions   Order Comments: Give two-step Mantoux (PPD) Per Facility Policy Yes     Reason for your hospital stay   Order Comments: You were hospitalized due to a urinary tract infection and endocarditis with bacteremia     Activity - Up with nursing assistance     Order Specific Question Answer Comments   Is discharge order? Yes      Follow Up and recommended labs and tests   Order Comments: Continue your antibiotic course and follow up with Infectious Disease in clinic.    Follow up echocardiogram after completion of antibiotic treatment.  Follow up with cardiology in 4-6 weeks    The staples on the left side of your scalp need to be removed on 11/15/2022     Physical Therapy Adult Consult   Order Comments: Evaluate and treat as clinically indicated.    Reason:  frequent falls     Occupational Therapy Adult Consult   Order Comments: Evaluate and treat as clinically indicated.    Reason:  frequent falls     Echocardiogram Complete   Standing Status: Future Standing Exp. Date: 23   Order Comments: Administration of IV contrast will be tailored to this examination per the appropriate written protocol listed in the Echocardiography department Protocol Book, or by the supervising Cardiologist. This may result in an order change.    Use of contrast is at the discretion of the supervising Cardiologist.   Scheduling Instructions: Please call your clinic of choice to schedule this procedure:    [facility]:   [phone]  [facility]:   [phone]  [facility]:  [phone]  [facility]): [phone]  [facility]:  [phone]  [facility]): [phone]  [facility] [phone]     Order Specific Question Answer Comments   Preferred procedure? Echo Complete    Procedure to be scheduled at [facility] [8]      Fall precautions     Diet   Order Comments: Follow this diet upon discharge: Orders Placed This Encounter      Regular Diet Adult      Order Specific Question Answer Comments   Is discharge order? Yes        Pending Tests at Discharge:   none    Discharge Disposition:     Discharged to short-term care facility     Deborah De Leon MS, PA-C  Hospitalist Service  Pager [phone]    >30 minutes was spent in discharge planning, care coordination, physical examination and medication reconciliation on the date of discharge, 11/12/2022

## 2022-11-12 NOTE — CARE PLAN
"  Inpatient Progress Note: [id number]    Primary diagnosis:  Recurrent Falls    Vitals: /74 (BP Location: Left arm, Patient Position: Chair, Cuff Size: Adult Regular)   Pulse 83   Temp 98.6  F (37  C) (Oral)   Resp 18   Ht 1.549 m (5' 0.98\")   Wt 51.7 kg (114 lb)   SpO2 95%   BMI 21.55 kg/m      Neuro: Alert& Oriented, forgetful   Card: Regular rate and rhyme   Resp: Denies SBO, lungs are clear on asculation  GI: Pt had BM, active bowel sounds  :  Dribble, void without difficulty   Skin: Head/ face laceration  Activity: X1 with walker/GB  Diet: Regular   PIV: SL  Pain: Back pain, shoulder pt received cruz. Tylenol   Plan: IV antibiotic.Pt is discharging to [facility] @1000       "

## 2022-11-12 NOTE — PLAN OF CARE
Physical Therapy Discharge Summary    Reason for therapy discharge:    Discharged to transitional care facility.    Progress towards therapy goal(s). See goals on Care Plan in [facility] electronic health record for goal details.  Goals not met.  Barriers to achieving goals:   discharge from facility.    Therapy recommendation(s):    Continued therapy is recommended.  Rationale/Recommendations:  PT as indicated at TCU.      Note: Pt not seen by documenting PT on this date. Information obtained from chart review.

## 2022-11-12 NOTE — PLAN OF CARE
Patient's After Visit Summary was reviewed with patient. Package send to facility    Patient verbalized understanding of After Visit Summary, recommended follow up and was given an opportunity to ask questions.   Discharge medications sent home with patient/family: No   Discharged with transport tech      OBSERVATION patient END time: 10:15

## 2022-11-12 NOTE — PLAN OF CARE
"Goal Outcome Evaluation:  A & O X 4 with forgetfulness/ confusions. Lung sound clear bilaterally. Denies shortness of breath, dizziness, or lightheadedness. A X 1 with cares. Single lumen midline saline lock. Ambulated to the bathroom with a walker and gait belt.  Patient on potassium and magnesium  RN managed protocol. Expected to discharge to [facility] on 11/12 at 10 AM. SW/PT/OT following.    BP (!) 149/65 (BP Location: Left arm)   Pulse 75   Temp 97.6  F (36.4  C) (Oral)   Resp 16   Ht 1.549 m (5' 0.98\")   Wt 51.7 kg (114 lb)   SpO2 93%   BMI 21.55 kg/m     Will continue to provide care and support.    "

## 2022-11-13 LAB
BACTERIA BLD CULT: NO GROWTH
BACTERIA BLD CULT: NO GROWTH

## 2022-11-14 ENCOUNTER — TRANSITIONAL CARE UNIT VISIT (OUTPATIENT)
Dept: GERIATRICS | Facility: CLINIC | Age: 77
End: 2022-11-14
Payer: MEDICARE

## 2022-11-14 ENCOUNTER — PATIENT OUTREACH (OUTPATIENT)
Dept: CARE COORDINATION | Facility: CLINIC | Age: 77
End: 2022-11-14

## 2022-11-14 VITALS
RESPIRATION RATE: 18 BRPM | HEIGHT: 61 IN | HEART RATE: 77 BPM | OXYGEN SATURATION: 94 % | SYSTOLIC BLOOD PRESSURE: 152 MMHG | TEMPERATURE: 97 F | WEIGHT: 116.8 LBS | BODY MASS INDEX: 22.05 KG/M2 | DIASTOLIC BLOOD PRESSURE: 74 MMHG

## 2022-11-14 DIAGNOSIS — M62.81 GENERALIZED MUSCLE WEAKNESS: ICD-10-CM

## 2022-11-14 DIAGNOSIS — R78.81 BACTEREMIA: ICD-10-CM

## 2022-11-14 DIAGNOSIS — S01.01XD LACERATION OF SCALP, SUBSEQUENT ENCOUNTER: ICD-10-CM

## 2022-11-14 DIAGNOSIS — R29.6 FALLS FREQUENTLY: ICD-10-CM

## 2022-11-14 DIAGNOSIS — N39.0 URINARY TRACT INFECTION WITHOUT HEMATURIA, SITE UNSPECIFIED: Primary | ICD-10-CM

## 2022-11-14 DIAGNOSIS — G20.A1 PARKINSON DISEASE (H): ICD-10-CM

## 2022-11-14 PROCEDURE — 99310 SBSQ NF CARE HIGH MDM 45: CPT | Performed by: NURSE PRACTITIONER

## 2022-11-14 NOTE — LETTER
11/14/2022        RE: [place]  [address]  [place] [address]        [place] [facility]  PRIMARY CARE PROVIDER AND CLINIC:  OSCAR Manuel CNP, [employer] [facility] [address]  / [facility] [address]  Chief Complaint   Patient presents with     [facility] Medical Record Number:  [id number]  Place of Service where encounter took place:  [facility] (U) [[id number]]    Pablo Baez  is a 77 year old  (1945), admitted to the above facility from  [facility]. Hospital stay 11/5/22 through 11/12/22..  Admitted to this facility for  rehab, medical management and nursing care.    HPI:    HPI information obtained from: facility chart records, facility staff and patient report.   Brief Summary of Hospital Course:   Updates on Status Since Skilled nursing Admission:     Patient Pablo Baez is a 77 yr old female admitted to [facility] for rehabilitation s/p hospitalization [facility] 11/5-11/12/22 for pseudomonas urinary tract infection, streptococcus oralis bacteremia, endocarditis and falls    PMHX parkinsons disease, UTI, BPPV, cognitive impairment  Lives in [place]      TODAY  Patient denies gastrointestinal or urinary signs and symptoms   Reports regular elimination  Denies pain, shortness of breath or chest pain  States she is eating meals, states she coughs at times when she eats  Reports sleeping   Reports goal to return to long-term  Wishes to be FULL code         CODE STATUS/ADVANCE DIRECTIVES DISCUSSION:   CPR/Full code   Patient's living condition: lives in an assisted living facility  ALLERGIES: Lisinopril  PAST MEDICAL HISTORY:  has no past medical history on file.  PAST SURGICAL HISTORY:   has no past surgical history on file.  FAMILY HISTORY: family history is not on file.  SOCIAL HISTORY:   reports that she has never smoked. She has never used smokeless  tobacco.    Post Discharge Medication Reconciliation Status: discharge medications reconciled and changed, per note/orders    Current Outpatient Medications   Medication Sig Dispense Refill     acetaminophen (TYLENOL) 500 MG tablet Take 2 tablets (1,000 mg) by mouth 3 times daily At 0800, 1400, 2000       carbidopa-levodopa (SINEMET CR)  MG CR tablet Take 2 tablets by mouth At Bedtime       carbidopa-levodopa (SINEMET)  MG tablet Take 2 tablets by mouth 2 times daily At 12pm and 8pm       carbidopa-levodopa (SINEMET)  MG tablet Take 1.5 tablets by mouth 2 times daily 0800 and 1600       cefTRIAXone (ROCEPHIN) 1 GM vial Inject 2 g (2,000 mg) into the vein daily for 21 days ESR,CRP,CBC with differential, creatinine, SGOT weekly while on this medication to be faxed to Dr. Rojas office. 600 mL 0     cholecalciferol 50 MCG (2000 UT) tablet Take 1 tablet (50 mcg) by mouth daily       enoxaparin ANTICOAGULANT (LOVENOX) 40 MG/0.4ML syringe Inject 0.4 mLs (40 mg) Subcutaneous every 24 hours TCU provider to discontinue when mobility improves       famotidine (PEPCID) 20 MG tablet Take 1 tablet (20 mg) by mouth daily At 0800       glycopyrrolate (ROBINUL) 1 MG tablet Take 1 tablet (1 mg) by mouth every morning       methocarbamol (ROBAXIN) 500 MG tablet Take 0.5 tablets (250 mg) by mouth 3 times daily as needed for muscle spasms       Multiple Vitamins-Minerals (HM MULTIVITAMIN ADULT GUMMY) CHEW Take 1 chew tab by mouth daily       polyvinyl alcohol (LIQUIFILM TEARS) 1.4 % ophthalmic solution Place 1 drop into both eyes every 4 hours as needed for dry eyes       QUEtiapine (SEROQUEL) 25 MG tablet Take 0.5 tablets (12.5 mg) by mouth nightly as needed (agitation)       senna-docusate (SENOKOT-S/PERICOLACE) 8.6-50 MG tablet Take 1 tablet by mouth daily as needed         ROS:  10 point ROS of systems including Constitutional, Eyes, Respiratory, Cardiovascular, Gastroenterology, Genitourinary, Integumentary,  "Musculoskeletal, Psychiatric were all negative except for pertinent positives noted in my HPI.    Vitals:  BP (!) 152/74   Pulse 77   Temp 97  F (36.1  C)   Resp 18   Ht 1.549 m (5' 1\")   Wt 53 kg (116 lb 12.8 oz)   SpO2 94%   BMI 22.07 kg/m    Exam:  GENERAL APPEARANCE:  Alert, in no distress  ENT:  Mouth and posterior oropharynx normal, moist mucous membranes  EYES:  EOM, conjunctivae, lids, pupils and irises normal  NECK:  No adenopathy,masses or thyromegaly  RESP:  respiratory effort and palpation of chest normal, lungs clear to auscultation , no respiratory distress  CV:  Palpation and auscultation of heart done , regular rate and rhythm, no murmur, rub, or gallop  ABDOMEN:  normal bowel sounds, soft, nontender, no hepatosplenomegaly or other masses  M/S:   sitting in WC, kyphosis   SKIN:  Inspection of skin and subcutaneous tissue baseline  NEURO:   Cranial nerves 2-12 are normal tested and grossly at patient's baseline  PSYCH:  oriented X 3    Lab/Diagnostic data:  Labs done in SNF are in [place] EPIC. Please refer to them using [employer] Everywhere.     Last Comprehensive Metabolic Panel:  Sodium   Date Value Ref Range Status   11/08/2022 141 136 - 145 mmol/L Final     Potassium   Date Value Ref Range Status   11/11/2022 3.8 3.4 - 5.3 mmol/L Final   01/12/2022 4.2 3.4 - 5.3 mmol/L Final     Comment:     Specimen slightly hemolyzed, potassium may be falsely elevated.     Chloride   Date Value Ref Range Status   11/08/2022 103 98 - 107 mmol/L Final   01/12/2022 108 94 - 109 mmol/L Final     Carbon Dioxide (CO2)   Date Value Ref Range Status   11/08/2022 30 (H) 22 - 29 mmol/L Final   01/12/2022 26 20 - 32 mmol/L Final     Anion Gap   Date Value Ref Range Status   11/08/2022 8 7 - 15 mmol/L Final   01/12/2022 5 3 - 14 mmol/L Final     Glucose   Date Value Ref Range Status   01/12/2022 106 (H) 70 - 99 mg/dL Final     GLUCOSE BY METER POCT   Date Value Ref Range Status   11/10/2022 134 (H) 70 - 99 mg/dL " Final     Urea Nitrogen   Date Value Ref Range Status   11/08/2022 10.4 8.0 - 23.0 mg/dL Final   01/12/2022 12 7 - 30 mg/dL Final     Creatinine   Date Value Ref Range Status   11/11/2022 0.51 0.51 - 0.95 mg/dL Final     GFR Estimate   Date Value Ref Range Status   11/11/2022 >90 >60 mL/min/1.73m2 Final     Comment:     Effective December 21, 2021 eGFRcr in adults is calculated using the 2021 CKD-EPI creatinine equation which includes age and gender (Antonio et al., NE, DOI: 10.1056/IJIElu4715338)     Calcium   Date Value Ref Range Status   11/08/2022 9.5 8.8 - 10.2 mg/dL Final       Lab Results   Component Value Date    WBC 8.2 11/08/2022     Lab Results   Component Value Date    RBC 3.70 11/08/2022     Lab Results   Component Value Date    HGB 12.5 11/08/2022     Lab Results   Component Value Date    HCT 39.6 11/08/2022     No components found for: MCT  Lab Results   Component Value Date     11/08/2022     Lab Results   Component Value Date    MCH 33.8 11/08/2022     Lab Results   Component Value Date    MCHC 31.6 11/08/2022     Lab Results   Component Value Date    RDW 12.5 11/08/2022     Lab Results   Component Value Date     11/12/2022         ASSESSMENT/PLAN:    Pseudomonas UTI  Streptococcus Oralis Bacteremia  Endocarditis   repeat blood cultures have been NGTD  LEN performed on 11/10 revealed mitral valve with likely small vegetations  Continue 3 week course IV Ceftriaxone per infectious disease and cardiology    Continue weekly labs:   ESR,CRP,CBC with differential, creatinine, SGOT weekly while on this medication to be faxed to Dr. Rojas office.  Follow up echocardiogram after antibiotic course and Cardiology clinic in 4-6 weeks   Continue Lovenox until more mobile     Recurrent Falls  Close Head Injury  Hx Parkinson's Disease with Gait Instability   Recurrent falls per hospital report:   -ED on 11/1 with laceration repair to the right scalp with staples and right eyebrow repair with  sutures.  CT head with scalp hematomas and c/s was negative and discharge back to senior living.    -ED 11/5 am after a mechanical fall with laceration to her left parietal scalp with 2 staples placed and discharge back to her [facility].  CT head and C/S with no acute abnormality.  -ED 11/5 later in the day at her senior living with another fall with generalized weakness and found to have a low grade fever of 100.1.  A Laceration of the left posterior scalp was repaired with staples.  - right eyebrow sutures removed on 11/9   - right scalp staples were removed on 11/11  - left scalp staples to be removed around 11/15     Parkinson's Disease  Cognitive Impairment  parkinson's disease diagnosed 10/2018.    baseline gait instability, orofacial dyskinesia and hallucinations    SLUMS 21/30  Continue Sinemet, Robinul and prn Seroquel.   Nuplazid stopped in hospital  Follow up neurologist Dr. Hinojosa as advised    Dysphagia  Reports difficulty swallowing at times  speech therapy referral      Hx Chronic Neck Pain   h/o cervical spondylosis s/p cervical fusion.  CT c/s with no acute abnormalities  Reports pain managed  Continue Tylenol and Robaxin prn      Hx Cdiff   hx of cdiff in 2021  No recent diarrhea  Per ID and no prophylactic vancomycin is needed    Advanced care planning  Wishes to be FULL code    Total time spent with patient visit at the skilled nursing facility was 36 min including patient visit and review of past records. Greater than 50% of total time spent with counseling and coordinating care due to discussion on urinary tract infection management, functional goals, goals of care and discharge planning.     Electronically signed by:  OSCAR Garduno CNP                           Sincerely,        OSCAR Garduno CNP

## 2022-11-14 NOTE — PROGRESS NOTES
[facility]    Background: Transitional Care Management program identified per system criteria and reviewed by [facility] Resource [place] team for possible outreach.    Assessment: Upon chart review, CCR Team member will not proceed with patient outreach related to this episode of Transitional Care Management program due to reason below:    Non-[facility] TCU: Patient is not established within [facility] Primary Care and CCRC team member noted patient discharged to TCU/ARU/LTACH.    Plan: Transitional Care Management episode addressed appropriately per reason noted above.      Loyda Pope RN  [facility] [place], [facility]    *Connected Care Resource Team does NOT follow patient ongoing. Referrals are identified based on internal discharge reports and the outreach is to ensure patient has an understanding of their discharge instructions.

## 2022-11-14 NOTE — PROGRESS NOTES
[place] [facility]  PRIMARY CARE PROVIDER AND CLINIC:  OSCAR Manuel CNP, [place] [address] / [facility] [address]  Chief Complaint   Patient presents with     [facility] Medical Record Number:  [id number]  Place of Service where encounter took place:  [facility] - [facility] (U) [[id number]]    Pablo Baez  is a 77 year old  (1945), admitted to the above facility from  [facility]. Hospital stay 11/5/22 through 11/12/22..  Admitted to this facility for  rehab, medical management and nursing care.    HPI:    HPI information obtained from: facility chart records, facility staff and patient report.   Brief Summary of Hospital Course:   Updates on Status Since Skilled nursing Admission:     Patient Pablo Baez is a 77 yr old female admitted to [facility] for rehabilitation s/p hospitalization [facility] 11/5-11/12/22 for pseudomonas urinary tract infection, streptococcus oralis bacteremia, endocarditis and falls    PMHX parkinsons disease, UTI, BPPV, cognitive impairment  Lives in [facility]      TODAY  Patient denies gastrointestinal or urinary signs and symptoms   Reports regular elimination  Denies pain, shortness of breath or chest pain  States she is eating meals, states she coughs at times when she eats  Reports sleeping   Reports goal to return to [facility]  Wishes to be FULL code         CODE STATUS/ADVANCE DIRECTIVES DISCUSSION:   CPR/Full code   Patient's living condition: lives in an assisted living facility  ALLERGIES: Lisinopril  PAST MEDICAL HISTORY:  has no past medical history on file.  PAST SURGICAL HISTORY:   has no past surgical history on file.  FAMILY HISTORY: family history is not on file.  SOCIAL HISTORY:   reports that she has never smoked. She has never used smokeless tobacco.    Post Discharge Medication Reconciliation Status: discharge medications reconciled and changed, per  note/orders    Current Outpatient Medications   Medication Sig Dispense Refill     acetaminophen (TYLENOL) 500 MG tablet Take 2 tablets (1,000 mg) by mouth 3 times daily At 0800, 1400, 2000       carbidopa-levodopa (SINEMET CR)  MG CR tablet Take 2 tablets by mouth At Bedtime       carbidopa-levodopa (SINEMET)  MG tablet Take 2 tablets by mouth 2 times daily At 12pm and 8pm       carbidopa-levodopa (SINEMET)  MG tablet Take 1.5 tablets by mouth 2 times daily 0800 and 1600       cefTRIAXone (ROCEPHIN) 1 GM vial Inject 2 g (2,000 mg) into the vein daily for 21 days ESR,CRP,CBC with differential, creatinine, SGOT weekly while on this medication to be faxed to Dr. Rojas office. 600 mL 0     cholecalciferol 50 MCG (2000 UT) tablet Take 1 tablet (50 mcg) by mouth daily       enoxaparin ANTICOAGULANT (LOVENOX) 40 MG/0.4ML syringe Inject 0.4 mLs (40 mg) Subcutaneous every 24 hours TCU provider to discontinue when mobility improves       famotidine (PEPCID) 20 MG tablet Take 1 tablet (20 mg) by mouth daily At 0800       glycopyrrolate (ROBINUL) 1 MG tablet Take 1 tablet (1 mg) by mouth every morning       methocarbamol (ROBAXIN) 500 MG tablet Take 0.5 tablets (250 mg) by mouth 3 times daily as needed for muscle spasms       Multiple Vitamins-Minerals (HM MULTIVITAMIN ADULT GUMMY) CHEW Take 1 chew tab by mouth daily       polyvinyl alcohol (LIQUIFILM TEARS) 1.4 % ophthalmic solution Place 1 drop into both eyes every 4 hours as needed for dry eyes       QUEtiapine (SEROQUEL) 25 MG tablet Take 0.5 tablets (12.5 mg) by mouth nightly as needed (agitation)       senna-docusate (SENOKOT-S/PERICOLACE) 8.6-50 MG tablet Take 1 tablet by mouth daily as needed         ROS:  10 point ROS of systems including Constitutional, Eyes, Respiratory, Cardiovascular, Gastroenterology, Genitourinary, Integumentary, Musculoskeletal, Psychiatric were all negative except for pertinent positives noted in my HPI.    Vitals:  BP (!)  "152/74   Pulse 77   Temp 97  F (36.1  C)   Resp 18   Ht 1.549 m (5' 1\")   Wt 53 kg (116 lb 12.8 oz)   SpO2 94%   BMI 22.07 kg/m    Exam:  GENERAL APPEARANCE:  Alert, in no distress  ENT:  Mouth and posterior oropharynx normal, moist mucous membranes  EYES:  EOM, conjunctivae, lids, pupils and irises normal  NECK:  No adenopathy,masses or thyromegaly  RESP:  respiratory effort and palpation of chest normal, lungs clear to auscultation , no respiratory distress  CV:  Palpation and auscultation of heart done , regular rate and rhythm, no murmur, rub, or gallop  ABDOMEN:  normal bowel sounds, soft, nontender, no hepatosplenomegaly or other masses  M/S:   sitting in WC, kyphosis   SKIN:  Inspection of skin and subcutaneous tissue baseline  NEURO:   Cranial nerves 2-12 are normal tested and grossly at patient's baseline  PSYCH:  oriented X 3    Lab/Diagnostic data:  Labs done in SNF are in [facility]. Please refer to them using [place]/Care Everywhere.     Last Comprehensive Metabolic Panel:  Sodium   Date Value Ref Range Status   11/08/2022 141 136 - 145 mmol/L Final     Potassium   Date Value Ref Range Status   11/11/2022 3.8 3.4 - 5.3 mmol/L Final   01/12/2022 4.2 3.4 - 5.3 mmol/L Final     Comment:     Specimen slightly hemolyzed, potassium may be falsely elevated.     Chloride   Date Value Ref Range Status   11/08/2022 103 98 - 107 mmol/L Final   01/12/2022 108 94 - 109 mmol/L Final     Carbon Dioxide (CO2)   Date Value Ref Range Status   11/08/2022 30 (H) 22 - 29 mmol/L Final   01/12/2022 26 20 - 32 mmol/L Final     Anion Gap   Date Value Ref Range Status   11/08/2022 8 7 - 15 mmol/L Final   01/12/2022 5 3 - 14 mmol/L Final     Glucose   Date Value Ref Range Status   01/12/2022 106 (H) 70 - 99 mg/dL Final     GLUCOSE BY METER POCT   Date Value Ref Range Status   11/10/2022 134 (H) 70 - 99 mg/dL Final     Urea Nitrogen   Date Value Ref Range Status   11/08/2022 10.4 8.0 - 23.0 mg/dL Final   01/12/2022 12 7 - " 30 mg/dL Final     Creatinine   Date Value Ref Range Status   11/11/2022 0.51 0.51 - 0.95 mg/dL Final     GFR Estimate   Date Value Ref Range Status   11/11/2022 >90 >60 mL/min/1.73m2 Final     Comment:     Effective December 21, 2021 eGFRcr in adults is calculated using the 2021 CKD-EPI creatinine equation which includes age and gender (Antonio et al., NE, DOI: 10.[facility]6/DOOStp1442534)     Calcium   Date Value Ref Range Status   11/08/2022 9.5 8.8 - 10.2 mg/dL Final       Lab Results   Component Value Date    WBC 8.2 11/08/2022     Lab Results   Component Value Date    RBC 3.70 11/08/2022     Lab Results   Component Value Date    HGB 12.5 11/08/2022     Lab Results   Component Value Date    HCT 39.6 11/08/2022     No components found for: MCT  Lab Results   Component Value Date     11/08/2022     Lab Results   Component Value Date    MCH 33.8 11/08/2022     Lab Results   Component Value Date    MCHC 31.6 11/08/2022     Lab Results   Component Value Date    RDW 12.5 11/08/2022     Lab Results   Component Value Date     11/12/2022         ASSESSMENT/PLAN:    Pseudomonas UTI  Streptococcus Oralis Bacteremia  Endocarditis   repeat blood cultures have been NGTD  LEN performed on 11/10 revealed mitral valve with likely small vegetations  Continue 3 week course IV Ceftriaxone per infectious disease and cardiology    Continue weekly labs:   ESR,CRP,CBC with differential, creatinine, SGOT weekly while on this medication to be faxed to Dr. Rojas office.  Follow up echocardiogram after antibiotic course and Cardiology clinic in 4-6 weeks   Continue Lovenox until more mobile     Recurrent Falls  Close Head Injury  Hx Parkinson's Disease with Gait Instability   Recurrent falls per hospital report:   -ED on 11/1 with laceration repair to the right scalp with staples and right eyebrow repair with sutures.  CT head with scalp hematomas and c/s was negative and discharge back to [facility].    -ED 11/5 am after a mechanical  fall with laceration to her left parietal scalp with 2 staples placed and discharge back to her [facility].  CT head and C/S with no acute abnormality.  -ED 11/5 later in the day at her intermediate with another fall with generalized weakness and found to have a low grade fever of 100.1.  A Laceration of the left posterior scalp was repaired with staples.  - right eyebrow sutures removed on 11/9   - right scalp staples were removed on 11/11  - left scalp staples to be removed around 11/15     Parkinson's Disease  Cognitive Impairment  parkinson's disease diagnosed 10/2018.    baseline gait instability, orofacial dyskinesia and hallucinations    SLUMS 21/30  Continue Sinemet, Robinul and prn Seroquel.   Nuplazid stopped in hospital  Follow up neurologist Dr. Hinojosa as advised    Dysphagia  Reports difficulty swallowing at times  speech therapy referral      Hx Chronic Neck Pain   h/o cervical spondylosis s/p cervical fusion.  CT c/s with no acute abnormalities  Reports pain managed  Continue Tylenol and Robaxin prn      Hx Cdiff   hx of cdiff in 2021  No recent diarrhea  Per ID and no prophylactic vancomycin is needed    Advanced care planning  Wishes to be FULL code    Total time spent with patient visit at the skilled nursing facility was 36 min including patient visit and review of past records. Greater than 50% of total time spent with counseling and coordinating care due to discussion on urinary tract infection management, functional goals, goals of care and discharge planning.     Electronically signed by:  OSCAR Garduno CNP

## 2022-11-15 ENCOUNTER — LAB REQUISITION (OUTPATIENT)
Dept: LAB | Facility: CLINIC | Age: 77
End: 2022-11-15
Payer: MEDICARE

## 2022-11-15 ENCOUNTER — DOCUMENTATION ONLY (OUTPATIENT)
Dept: OTHER | Facility: CLINIC | Age: 77
End: 2022-11-15

## 2022-11-15 DIAGNOSIS — B99.9 UNSPECIFIED INFECTIOUS DISEASE: ICD-10-CM

## 2022-11-15 DIAGNOSIS — Z11.1 ENCOUNTER FOR SCREENING FOR RESPIRATORY TUBERCULOSIS: ICD-10-CM

## 2022-11-16 ENCOUNTER — TRANSITIONAL CARE UNIT VISIT (OUTPATIENT)
Dept: GERIATRICS | Facility: CLINIC | Age: 77
End: 2022-11-16
Payer: MEDICARE

## 2022-11-16 VITALS
HEART RATE: 75 BPM | RESPIRATION RATE: 18 BRPM | WEIGHT: 116.8 LBS | OXYGEN SATURATION: 93 % | BODY MASS INDEX: 22.05 KG/M2 | SYSTOLIC BLOOD PRESSURE: 150 MMHG | HEIGHT: 61 IN | DIASTOLIC BLOOD PRESSURE: 74 MMHG | TEMPERATURE: 97.6 F

## 2022-11-16 DIAGNOSIS — R78.81 BACTEREMIA: ICD-10-CM

## 2022-11-16 DIAGNOSIS — R29.6 FALLS FREQUENTLY: ICD-10-CM

## 2022-11-16 DIAGNOSIS — M62.81 GENERALIZED MUSCLE WEAKNESS: ICD-10-CM

## 2022-11-16 DIAGNOSIS — N39.0 URINARY TRACT INFECTION WITHOUT HEMATURIA, SITE UNSPECIFIED: Primary | ICD-10-CM

## 2022-11-16 LAB
ANION GAP SERPL CALCULATED.3IONS-SCNC: 12 MMOL/L (ref 3–14)
AST SERPL W P-5'-P-CCNC: 20 U/L (ref 0–45)
BASOPHILS # BLD AUTO: 0.1 10E3/UL (ref 0–0.2)
BASOPHILS NFR BLD AUTO: 1 %
BUN SERPL-MCNC: 12 MG/DL (ref 7–30)
CALCIUM SERPL-MCNC: 9.9 MG/DL (ref 8.5–10.1)
CHLORIDE BLD-SCNC: 100 MMOL/L (ref 94–109)
CO2 SERPL-SCNC: 28 MMOL/L (ref 20–32)
CREAT SERPL-MCNC: 0.5 MG/DL (ref 0.52–1.04)
CREAT SERPL-MCNC: 0.55 MG/DL (ref 0.52–1.04)
CRP SERPL-MCNC: 7.1 MG/L (ref 0–8)
EOSINOPHIL # BLD AUTO: 0.1 10E3/UL (ref 0–0.7)
EOSINOPHIL NFR BLD AUTO: 2 %
ERYTHROCYTE [DISTWIDTH] IN BLOOD BY AUTOMATED COUNT: 13.1 % (ref 10–15)
GFR SERPL CREATININE-BSD FRML MDRD: >90 ML/MIN/1.73M2
GFR SERPL CREATININE-BSD FRML MDRD: >90 ML/MIN/1.73M2
GLUCOSE BLD-MCNC: 101 MG/DL (ref 70–99)
HCT VFR BLD AUTO: 40.2 % (ref 35–47)
HGB BLD-MCNC: 12.4 G/DL (ref 11.7–15.7)
IMM GRANULOCYTES # BLD: 0 10E3/UL
IMM GRANULOCYTES NFR BLD: 0 %
LYMPHOCYTES # BLD AUTO: 1.3 10E3/UL (ref 0.8–5.3)
LYMPHOCYTES NFR BLD AUTO: 20 %
MCH RBC QN AUTO: 34.2 PG (ref 26.5–33)
MCHC RBC AUTO-ENTMCNC: 30.8 G/DL (ref 31.5–36.5)
MCV RBC AUTO: 111 FL (ref 78–100)
MONOCYTES # BLD AUTO: 0.4 10E3/UL (ref 0–1.3)
MONOCYTES NFR BLD AUTO: 5 %
NEUTROPHILS # BLD AUTO: 4.9 10E3/UL (ref 1.6–8.3)
NEUTROPHILS NFR BLD AUTO: 72 %
NRBC # BLD AUTO: 0 10E3/UL
NRBC BLD AUTO-RTO: 0 /100
PLATELET # BLD AUTO: 744 10E3/UL (ref 150–450)
POTASSIUM BLD-SCNC: 3.5 MMOL/L (ref 3.4–5.3)
RBC # BLD AUTO: 3.63 10E6/UL (ref 3.8–5.2)
SODIUM SERPL-SCNC: 140 MMOL/L (ref 133–144)
WBC # BLD AUTO: 6.8 10E3/UL (ref 4–11)

## 2022-11-16 PROCEDURE — 82565 ASSAY OF CREATININE: CPT | Performed by: NURSE PRACTITIONER

## 2022-11-16 PROCEDURE — 85014 HEMATOCRIT: CPT | Performed by: NURSE PRACTITIONER

## 2022-11-16 PROCEDURE — 80048 BASIC METABOLIC PNL TOTAL CA: CPT | Performed by: NURSE PRACTITIONER

## 2022-11-16 PROCEDURE — 86140 C-REACTIVE PROTEIN: CPT | Performed by: NURSE PRACTITIONER

## 2022-11-16 PROCEDURE — 82310 ASSAY OF CALCIUM: CPT | Performed by: NURSE PRACTITIONER

## 2022-11-16 PROCEDURE — 84450 TRANSFERASE (AST) (SGOT): CPT | Performed by: NURSE PRACTITIONER

## 2022-11-16 PROCEDURE — 86481 TB AG RESPONSE T-CELL SUSP: CPT | Performed by: NURSE PRACTITIONER

## 2022-11-16 PROCEDURE — 36415 COLL VENOUS BLD VENIPUNCTURE: CPT | Performed by: NURSE PRACTITIONER

## 2022-11-16 PROCEDURE — 99309 SBSQ NF CARE MODERATE MDM 30: CPT | Performed by: NURSE PRACTITIONER

## 2022-11-16 NOTE — PROGRESS NOTES
[place] [facility]  [place] Medical Record Number:  [id number]  Place of Service where encounter took place:  [facility] (TCU) [[id number]]  Chief Complaint   Patient presents with     Nursing Home Acute       HPI:    Pablo Baez  is a 77 year old (1945), who is being seen today for an episodic care visit.  HPI information obtained from: facility chart records, facility staff and patient report. Today's concern is:    Patient Pablo Baez is a 77 yr old female admitted to [facility] for rehabilitation s/p hospitalization [facility] 11/5-11/12/22 for pseudomonas urinary tract infection, streptococcus oralis bacteremia, endocarditis and falls    PMHX parkinsons disease, UTI, BPPV, cognitive impairment  Lives in residential         Urinary tract infection without hematuria, site unspecified  Bacteremia  Generalized muscle weakness  Falls frequently     Patient reports feeling well  Tolerating antibiotic  No complications at this time  Pending labs  Participating in therapies and states she feels like she is getting closer to her baseline  States she typically uses walker at home, however, states she often falls asleep and thinks she will need a wheelchair moving forwards   Reports she is eating and having regular elimination    C/o some congestion at night past few days, denies shortness of breath  Has cough at times  Check COVID19 negative  Vitals stable room air    Followed by speech therapy & dietary    Per therapies report:   Balance: TUG 71 secs FWW Jin 34/56 CRT 1 w/ UEs Transfer: CGA Bed Mob.: SBA Stairs: Distance: 45  with FWW Lvl of assist: CGA       Past Medical and Surgical History reviewed in Epic today.    MEDICATIONS:    Current Outpatient Medications   Medication Sig Dispense Refill     acetaminophen (TYLENOL) 500 MG tablet Take 2 tablets (1,000 mg) by mouth 3 times daily At 0800, 1400, 2000       carbidopa-levodopa (SINEMET CR)  MG  "CR tablet Take 2 tablets by mouth At Bedtime       carbidopa-levodopa (SINEMET)  MG tablet Take 2 tablets by mouth 2 times daily At 12pm and 8pm       carbidopa-levodopa (SINEMET)  MG tablet Take 1.5 tablets by mouth 2 times daily 0800 and 1600       cefTRIAXone (ROCEPHIN) 1 GM vial Inject 2 g (2,000 mg) into the vein daily for 21 days ESR,CRP,CBC with differential, creatinine, SGOT weekly while on this medication to be faxed to Dr. Rojas office. 600 mL 0     cholecalciferol 50 MCG (2000 UT) tablet Take 1 tablet (50 mcg) by mouth daily       enoxaparin ANTICOAGULANT (LOVENOX) 40 MG/0.4ML syringe Inject 0.4 mLs (40 mg) Subcutaneous every 24 hours TCU provider to discontinue when mobility improves       famotidine (PEPCID) 20 MG tablet Take 1 tablet (20 mg) by mouth daily At 0800       glycopyrrolate (ROBINUL) 1 MG tablet Take 1 tablet (1 mg) by mouth every morning       methocarbamol (ROBAXIN) 500 MG tablet Take 0.5 tablets (250 mg) by mouth 3 times daily as needed for muscle spasms       Multiple Vitamins-Minerals (HM MULTIVITAMIN ADULT GUMMY) CHEW Take 1 chew tab by mouth daily       polyvinyl alcohol (LIQUIFILM TEARS) 1.4 % ophthalmic solution Place 1 drop into both eyes every 4 hours as needed for dry eyes       QUEtiapine (SEROQUEL) 25 MG tablet Take 0.5 tablets (12.5 mg) by mouth nightly as needed (agitation)       senna-docusate (SENOKOT-S/PERICOLACE) 8.6-50 MG tablet Take 1 tablet by mouth daily as needed           REVIEW OF SYSTEMS:  4 point ROS including Respiratory, CV, GI and , other than that noted in the HPI,  is negative    Objective:  BP (!) 150/74   Pulse 75   Temp 97.6  F (36.4  C)   Resp 18   Ht 1.549 m (5' 1\")   Wt 53 kg (116 lb 12.8 oz)   SpO2 93%   BMI 22.07 kg/m    Exam:  GENERAL APPEARANCE:  Alert, in no distress  RESP:  respiratory effort and palpation of chest normal, lungs clear to auscultation , no respiratory distress  CV:  Palpation and auscultation of heart done , " regular rate and rhythm, no murmur, rub, or gallop  ABDOMEN:  normal bowel sounds, soft, nontender, no hepatosplenomegaly or other masses  M/S:   sitting in WC  SKIN:  Inspection of skin and subcutaneous tissue baseline  PSYCH:  oriented X 3    Labs:   Labs done in [facility] are in [place] EPIC. Please refer to them using EPIC/Care Everywhere.     Last Comprehensive Metabolic Panel:  Sodium   Date Value Ref Range Status   11/08/2022 141 136 - 145 mmol/L Final     Potassium   Date Value Ref Range Status   11/11/2022 3.8 3.4 - 5.3 mmol/L Final   01/12/2022 4.2 3.4 - 5.3 mmol/L Final     Comment:     Specimen slightly hemolyzed, potassium may be falsely elevated.     Chloride   Date Value Ref Range Status   11/08/2022 103 98 - 107 mmol/L Final   01/12/2022 108 94 - 109 mmol/L Final     Carbon Dioxide (CO2)   Date Value Ref Range Status   11/08/2022 30 (H) 22 - 29 mmol/L Final   01/12/2022 26 20 - 32 mmol/L Final     Anion Gap   Date Value Ref Range Status   11/08/2022 8 7 - 15 mmol/L Final   01/12/2022 5 3 - 14 mmol/L Final     Glucose   Date Value Ref Range Status   01/12/2022 106 (H) 70 - 99 mg/dL Final     GLUCOSE BY METER POCT   Date Value Ref Range Status   11/10/2022 134 (H) 70 - 99 mg/dL Final     Urea Nitrogen   Date Value Ref Range Status   11/08/2022 10.4 8.0 - 23.0 mg/dL Final   01/12/2022 12 7 - 30 mg/dL Final     Creatinine   Date Value Ref Range Status   11/11/2022 0.51 0.51 - 0.95 mg/dL Final     GFR Estimate   Date Value Ref Range Status   11/11/2022 >90 >60 mL/min/1.73m2 Final     Comment:     Effective December 21, 2021 eGFRcr in adults is calculated using the 2021 CKD-EPI creatinine equation which includes age and gender (Antonio patel al., NEJM, DOI: 10.1056/BOBXle3496381)     Calcium   Date Value Ref Range Status   11/08/2022 9.5 8.8 - 10.2 mg/dL Final       Lab Results   Component Value Date    WBC 8.2 11/08/2022     Lab Results   Component Value Date    RBC 3.70 11/08/2022     Lab Results   Component Value  Date    HGB 12.5 11/08/2022     Lab Results   Component Value Date    HCT 39.6 11/08/2022     No components found for: MCT  Lab Results   Component Value Date     11/08/2022     Lab Results   Component Value Date    MCH 33.8 11/08/2022     Lab Results   Component Value Date    MCHC 31.6 11/08/2022     Lab Results   Component Value Date    RDW 12.5 11/08/2022     Lab Results   Component Value Date     11/12/2022         ASSESSMENT/PLAN:     Urinary tract infection without hematuria, site unspecified  Bacteremia  Generalized muscle weakness  Falls frequently     Patient reports feeling well  Tolerating antibiotic; continue Rocephin  No complications at this time  Pending labs, infectious disease following  Participating in therapies and states she feels like she is getting closer to her baseline  States she typically uses walker at home, however, states she often falls asleep and thinks she will need a wheelchair moving forwards   Reports she is eating and having regular elimination    C/o some congestion at night past few days, denies shortness of breath  Has cough at times  Check COVID19 negative  Vitals stable room air  monitor     Followed by speech therapy & dietary    Per therapies report:   Balance: TUG 71 secs FWW Jin 34/56 CRT 1 w/ UEs Transfer: CGA Bed Mob.: SBA Stairs: Distance: 45  with FWW Lvl of assist: contact guard assist  Continue therapies at this time        Electronically signed by:  OSCAR Garduno CNP

## 2022-11-17 ENCOUNTER — LAB REQUISITION (OUTPATIENT)
Dept: LAB | Facility: CLINIC | Age: 77
End: 2022-11-17
Payer: MEDICARE

## 2022-11-17 DIAGNOSIS — B99.9 UNSPECIFIED INFECTIOUS DISEASE: ICD-10-CM

## 2022-11-18 LAB
ERYTHROCYTE [SEDIMENTATION RATE] IN BLOOD BY WESTERGREN METHOD: 12 MM/HR (ref 0–30)
GAMMA INTERFERON BACKGROUND BLD IA-ACNC: 0.03 IU/ML
M TB IFN-G BLD-IMP: NEGATIVE
M TB IFN-G CD4+ BCKGRND COR BLD-ACNC: 2.87 IU/ML
MITOGEN IGNF BCKGRD COR BLD-ACNC: -0.02 IU/ML
MITOGEN IGNF BCKGRD COR BLD-ACNC: 0.04 IU/ML
QUANTIFERON MITOGEN: 2.9 IU/ML
QUANTIFERON NIL TUBE: 0.03 IU/ML
QUANTIFERON TB1 TUBE: 0.07 IU/ML
QUANTIFERON TB2 TUBE: 0.01

## 2022-11-18 PROCEDURE — 85652 RBC SED RATE AUTOMATED: CPT | Performed by: NURSE PRACTITIONER

## 2022-11-18 PROCEDURE — P9604 ONE-WAY ALLOW PRORATED TRIP: HCPCS | Performed by: NURSE PRACTITIONER

## 2022-11-18 PROCEDURE — 36415 COLL VENOUS BLD VENIPUNCTURE: CPT | Performed by: NURSE PRACTITIONER

## 2022-11-22 ENCOUNTER — LAB REQUISITION (OUTPATIENT)
Dept: LAB | Facility: CLINIC | Age: 77
End: 2022-11-22
Payer: MEDICARE

## 2022-11-22 DIAGNOSIS — B99.9 UNSPECIFIED INFECTIOUS DISEASE: ICD-10-CM

## 2022-11-23 ENCOUNTER — TRANSITIONAL CARE UNIT VISIT (OUTPATIENT)
Dept: GERIATRICS | Facility: CLINIC | Age: 77
End: 2022-11-23
Payer: MEDICARE

## 2022-11-23 VITALS
OXYGEN SATURATION: 96 % | HEART RATE: 70 BPM | BODY MASS INDEX: 21.75 KG/M2 | HEIGHT: 61 IN | SYSTOLIC BLOOD PRESSURE: 154 MMHG | WEIGHT: 115.2 LBS | TEMPERATURE: 98.1 F | RESPIRATION RATE: 18 BRPM | DIASTOLIC BLOOD PRESSURE: 84 MMHG

## 2022-11-23 DIAGNOSIS — G20.A1 PARKINSON DISEASE (H): ICD-10-CM

## 2022-11-23 DIAGNOSIS — R78.81 BACTEREMIA: ICD-10-CM

## 2022-11-23 DIAGNOSIS — M62.81 GENERALIZED MUSCLE WEAKNESS: ICD-10-CM

## 2022-11-23 DIAGNOSIS — N39.0 URINARY TRACT INFECTION WITHOUT HEMATURIA, SITE UNSPECIFIED: Primary | ICD-10-CM

## 2022-11-23 LAB
ANION GAP SERPL CALCULATED.3IONS-SCNC: 4 MMOL/L (ref 3–14)
AST SERPL W P-5'-P-CCNC: 17 U/L (ref 0–45)
BASOPHILS # BLD AUTO: 0.1 10E3/UL (ref 0–0.2)
BASOPHILS NFR BLD AUTO: 1 %
BUN SERPL-MCNC: 12 MG/DL (ref 7–30)
CALCIUM SERPL-MCNC: 9.2 MG/DL (ref 8.5–10.1)
CHLORIDE BLD-SCNC: 105 MMOL/L (ref 94–109)
CO2 SERPL-SCNC: 29 MMOL/L (ref 20–32)
CREAT SERPL-MCNC: 0.51 MG/DL (ref 0.52–1.04)
CREAT SERPL-MCNC: 0.52 MG/DL (ref 0.52–1.04)
CRP SERPL-MCNC: <2.9 MG/L (ref 0–8)
EOSINOPHIL # BLD AUTO: 0 10E3/UL (ref 0–0.7)
EOSINOPHIL NFR BLD AUTO: 1 %
ERYTHROCYTE [DISTWIDTH] IN BLOOD BY AUTOMATED COUNT: 13.2 % (ref 10–15)
ERYTHROCYTE [SEDIMENTATION RATE] IN BLOOD BY WESTERGREN METHOD: 8 MM/HR (ref 0–30)
GFR SERPL CREATININE-BSD FRML MDRD: >90 ML/MIN/1.73M2
GFR SERPL CREATININE-BSD FRML MDRD: >90 ML/MIN/1.73M2
GLUCOSE BLD-MCNC: 98 MG/DL (ref 70–99)
HCT VFR BLD AUTO: 36.7 % (ref 35–47)
HGB BLD-MCNC: 11.5 G/DL (ref 11.7–15.7)
IMM GRANULOCYTES # BLD: 0 10E3/UL
IMM GRANULOCYTES NFR BLD: 0 %
LYMPHOCYTES # BLD AUTO: 1.2 10E3/UL (ref 0.8–5.3)
LYMPHOCYTES NFR BLD AUTO: 25 %
MCH RBC QN AUTO: 33.7 PG (ref 26.5–33)
MCHC RBC AUTO-ENTMCNC: 31.3 G/DL (ref 31.5–36.5)
MCV RBC AUTO: 108 FL (ref 78–100)
MONOCYTES # BLD AUTO: 0.3 10E3/UL (ref 0–1.3)
MONOCYTES NFR BLD AUTO: 6 %
NEUTROPHILS # BLD AUTO: 3.3 10E3/UL (ref 1.6–8.3)
NEUTROPHILS NFR BLD AUTO: 67 %
NRBC # BLD AUTO: 0 10E3/UL
NRBC BLD AUTO-RTO: 0 /100
PLATELET # BLD AUTO: 547 10E3/UL (ref 150–450)
POTASSIUM BLD-SCNC: 3.9 MMOL/L (ref 3.4–5.3)
RBC # BLD AUTO: 3.41 10E6/UL (ref 3.8–5.2)
SODIUM SERPL-SCNC: 138 MMOL/L (ref 133–144)
WBC # BLD AUTO: 4.9 10E3/UL (ref 4–11)

## 2022-11-23 PROCEDURE — 82947 ASSAY GLUCOSE BLOOD QUANT: CPT | Performed by: NURSE PRACTITIONER

## 2022-11-23 PROCEDURE — P9604 ONE-WAY ALLOW PRORATED TRIP: HCPCS | Performed by: NURSE PRACTITIONER

## 2022-11-23 PROCEDURE — 84132 ASSAY OF SERUM POTASSIUM: CPT | Performed by: NURSE PRACTITIONER

## 2022-11-23 PROCEDURE — 84450 TRANSFERASE (AST) (SGOT): CPT | Performed by: NURSE PRACTITIONER

## 2022-11-23 PROCEDURE — 80048 BASIC METABOLIC PNL TOTAL CA: CPT | Performed by: NURSE PRACTITIONER

## 2022-11-23 PROCEDURE — 36415 COLL VENOUS BLD VENIPUNCTURE: CPT | Performed by: NURSE PRACTITIONER

## 2022-11-23 PROCEDURE — 86140 C-REACTIVE PROTEIN: CPT | Performed by: NURSE PRACTITIONER

## 2022-11-23 PROCEDURE — 82565 ASSAY OF CREATININE: CPT | Performed by: NURSE PRACTITIONER

## 2022-11-23 PROCEDURE — 85652 RBC SED RATE AUTOMATED: CPT | Performed by: NURSE PRACTITIONER

## 2022-11-23 PROCEDURE — 99309 SBSQ NF CARE MODERATE MDM 30: CPT | Performed by: NURSE PRACTITIONER

## 2022-11-23 PROCEDURE — 85014 HEMATOCRIT: CPT | Performed by: NURSE PRACTITIONER

## 2022-11-23 NOTE — PROGRESS NOTES
"[place] [facility]  [place] Medical Record Number:  [id number]  Place of Service where encounter took place:  [facility] (U) [[id number]]  Chief Complaint   Patient presents with     Nursing Home Acute       HPI:    Pablo Baez  is a 77 year old (1945), who is being seen today for an episodic care visit.  HPI information obtained from: facility chart records, facility staff and patient report. Today's concern is:    Patient Pablo Baez is a 77 yr old female admitted to [facility] for rehabilitation s/p hospitalization [facility] 11/5-11/12/22 for pseudomonas urinary tract infection, streptococcus oralis bacteremia, endocarditis and falls    PMHX parkinsons disease, UTI, BPPV, cognitive impairment  Lives in [facility]       Urinary tract infection without hematuria, site unspecified  Bacteremia  Generalized muscle weakness  Parkinson disease (H)     Tolerating antibiotics, PICC in place, no complications  Labs stable  Progressing in therapies, reports regular elimination  Denies chest pain or shortness of breath, cough or sore throat  Reports sleeping and eating fine  Mood stable  Only complaint is nurse had too much applesauce with medication on spoon and put it in her mouth. Reports \"I will feed myself my pills\"    Therapies report  SLUMS 23/30, IV antibiotics through 12/4/22, TUG 71 seconds, GOTTI 34/56, Transfers CGA, Bed mobility SBA, ambulating 160 ft with RW CGA, Toileting Max A, Dressing Min A      Past Medical and Surgical History reviewed in Epic today.    MEDICATIONS:    Current Outpatient Medications   Medication Sig Dispense Refill     acetaminophen (TYLENOL) 500 MG tablet Take 2 tablets (1,000 mg) by mouth 3 times daily At 0800, 1400, 2000       carbidopa-levodopa (SINEMET CR)  MG CR tablet Take 2 tablets by mouth At Bedtime       carbidopa-levodopa (SINEMET)  MG tablet Take 2 tablets by mouth 2 times daily At 12pm and 8pm   " "    carbidopa-levodopa (SINEMET)  MG tablet Take 1.5 tablets by mouth 2 times daily 0800 and 1600       cefTRIAXone (ROCEPHIN) 1 GM vial Inject 2 g (2,000 mg) into the vein daily for 21 days ESR,CRP,CBC with differential, creatinine, SGOT weekly while on this medication to be faxed to Dr. Rojas office. 600 mL 0     cholecalciferol 50 MCG (2000 UT) tablet Take 1 tablet (50 mcg) by mouth daily       famotidine (PEPCID) 20 MG tablet Take 1 tablet (20 mg) by mouth daily At 0800       glycopyrrolate (ROBINUL) 1 MG tablet Take 1 tablet (1 mg) by mouth every morning       methocarbamol (ROBAXIN) 500 MG tablet Take 0.5 tablets (250 mg) by mouth 3 times daily as needed for muscle spasms       Multiple Vitamins-Minerals (HM MULTIVITAMIN ADULT GUMMY) CHEW Take 1 chew tab by mouth daily       polyvinyl alcohol (LIQUIFILM TEARS) 1.4 % ophthalmic solution Place 1 drop into both eyes every 4 hours as needed for dry eyes       QUEtiapine (SEROQUEL) 25 MG tablet Take 0.5 tablets (12.5 mg) by mouth nightly as needed (agitation)       senna-docusate (SENOKOT-S/PERICOLACE) 8.6-50 MG tablet Take 1 tablet by mouth daily as needed           REVIEW OF SYSTEMS:  4 point ROS including Respiratory, CV, GI and , other than that noted in the HPI,  is negative    Objective:  BP (!) 154/84   Pulse 70   Temp 98.1  F (36.7  C)   Resp 18   Ht 1.549 m (5' 1\")   Wt 52.3 kg (115 lb 3.2 oz)   SpO2 96%   BMI 21.77 kg/m    Exam:  GENERAL APPEARANCE:  Alert, in no distress  RESP:  respiratory effort and palpation of chest normal, lungs clear to auscultation , no respiratory distress  CV:  Palpation and auscultation of heart done , regular rate and rhythm, no murmur, rub, or gallop  ABDOMEN:  normal bowel sounds, soft, nontender, no hepatosplenomegaly or other masses  M/S:   sitting in WC  SKIN:  Inspection of skin and subcutaneous tissue baseline, PICC in place right arm  NEURO:   Cranial nerves 2-12 are normal tested and grossly at " patient's baseline  PSYCH:  oriented X 3    Labs:   Labs done in SNF are in [place] EPIC. Please refer to them using Inventalator/Savedaily Everywhere.   Lab Results   Component Value Date    WBC 4.9 11/23/2022     Lab Results   Component Value Date    RBC 3.41 11/23/2022     Lab Results   Component Value Date    HGB 11.5 11/23/2022     Lab Results   Component Value Date    HCT 36.7 11/23/2022     No components found for: MCT  Lab Results   Component Value Date     11/23/2022     Lab Results   Component Value Date    MCH 33.7 11/23/2022     Lab Results   Component Value Date    MCHC 31.3 11/23/2022     Lab Results   Component Value Date    RDW 13.2 11/23/2022     Lab Results   Component Value Date     11/23/2022           Last Comprehensive Metabolic Panel:  Sodium   Date Value Ref Range Status   11/23/2022 138 133 - 144 mmol/L Final     Potassium   Date Value Ref Range Status   11/23/2022 3.9 3.4 - 5.3 mmol/L Final     Chloride   Date Value Ref Range Status   11/23/2022 105 94 - 109 mmol/L Final     Carbon Dioxide (CO2)   Date Value Ref Range Status   11/23/2022 29 20 - 32 mmol/L Final     Anion Gap   Date Value Ref Range Status   11/23/2022 4 3 - 14 mmol/L Final     Glucose   Date Value Ref Range Status   11/23/2022 98 70 - 99 mg/dL Final     Urea Nitrogen   Date Value Ref Range Status   11/23/2022 12 7 - 30 mg/dL Final     Creatinine   Date Value Ref Range Status   11/23/2022 0.51 (L) 0.52 - 1.04 mg/dL Final   11/23/2022 0.52 0.52 - 1.04 mg/dL Final     GFR Estimate   Date Value Ref Range Status   11/23/2022 >90 >60 mL/min/1.73m2 Final     Comment:     Effective December 21, 2021 eGFRcr in adults is calculated using the 2021 CKD-EPI creatinine equation which includes age and gender (Antonio patel al., NEJM, DOI: 10.1056/EZJLxz6001704)   11/23/2022 >90 >60 mL/min/1.73m2 Final     Comment:     Effective December 21, 2021 eGFRcr in adults is calculated using the 2021 CKD-EPI creatinine equation which includes age and  "gender (Antonio patel al., [facility], DOI: 10.1056/XAZYzi3139604)     Calcium   Date Value Ref Range Status   11/23/2022 9.2 8.5 - 10.1 mg/dL Final         ASSESSMENT/PLAN:     Urinary tract infection without hematuria, site unspecified  Bacteremia  Generalized muscle weakness  Parkinson disease (H)     Tolerating antibiotics, PICC in place, no complications  Continue Rocephin and weekly labs as ordered  Labs and vitals stable  Progressing in therapies, reports regular elimination  Denies chest pain or shortness of breath, cough or sore throat  Reports sleeping  Mood stable, likely can stop as needed Seroquel if not using  Only complaint is nurse had too much applesauce with medication on spoon and put it in her mouth. Reports \"I will feed myself my pills\"    Discontinue lovenox, patient more active  Therapies report  SLUMS 23/30, IV antibiotics through 12/4/22, TUG 71 seconds, GOTTI 34/56, Transfers CGA, Bed mobility SBA, ambulating 160 ft with RW CGA, Toileting Max A, Dressing Min A        Electronically signed by:  Alyssa Haji, APRN CNP           "

## 2022-11-25 VITALS
TEMPERATURE: 98.2 F | HEIGHT: 61 IN | BODY MASS INDEX: 21.56 KG/M2 | WEIGHT: 114.2 LBS | SYSTOLIC BLOOD PRESSURE: 158 MMHG | OXYGEN SATURATION: 92 % | DIASTOLIC BLOOD PRESSURE: 81 MMHG | RESPIRATION RATE: 18 BRPM | HEART RATE: 75 BPM

## 2022-11-25 NOTE — PROGRESS NOTES
[facility]    PRIMARY CARE PROVIDER AND CLINIC:  OSCAR Manuel CNP, [facility] [address] [place] [id number]  Chief Complaint   Patient presents with     Hospital F/U      [place] Medical Record Number:  [id number]  Place of Service where encounter took place:  [facility] [place] [facility] ([facility])     Pablo Baez  is a 77 year old  (1945), admitted to the above facility from  [facility]. Hospital stay 11/5/22 through 11/12/22..         Hospital course was reviewed by me, is as per the hospital discharge summary and nurse practitioner note.    Patient has a past medical history of heart disease, past UTI, benign positional vertigo as well as cognitive impairment.  She was admitted to the hospital for the evaluation of weakness with recurrent falls.  She was found to have a low-grade temperature.  Urine culture grew Pseudomonas.  Blood cultures grew Streptococcus oralis.  LEN performed on 11/10/2022 revealed a small vegetation on her mitral valve.  Patient was initially treated with cefepime.  Vancomycin was stopped restarted and was transitioned to IV Rocephin.  She was discharged on IV Rocephin to complete a 3-week course.  She will need a repeat echocardiogram following antibiotic therapy.    Patient states she is feeling well.  She denies fevers, chills, nausea, vomiting, diarrhea.  She notes occasional mild dysuria which she attributes to applesauce, which improves when she drinks more water.  She is progressing in therapy, is walking with a walker.  Appetite has been good        CODE STATUS/ADVANCE DIRECTIVES DISCUSSION:  Prior  CPR/Full code CPR/Full code   ALLERGIES:   Allergies   Allergen Reactions     Lisinopril Cough      PAST MEDICAL HISTORY: Parkinson's disease, frequent UTI, cognitive impairment, GERD, frequent falls  PAST SURGICAL HISTORY: Umbilical hernia repair, shoulder surgery, hip surgery following fracture, cervical  spine fusion.  FAMILY HISTORY: Reviewed by me in epic, is noncontributory  SOCIAL HISTORY:   reports that she has never smoked. She has never used smokeless tobacco.  Patient's living condition: lives in an assisted living facility    Current medications were reviewed by me today:    Current Outpatient Medications   Medication Sig     acetaminophen (TYLENOL) 500 MG tablet Take 2 tablets (1,000 mg) by mouth 3 times daily At 0800, 1400, 2000     carbidopa-levodopa (SINEMET CR)  MG CR tablet Take 2 tablets by mouth At Bedtime     carbidopa-levodopa (SINEMET)  MG tablet Take 2 tablets by mouth 2 times daily At 12pm and 8pm     carbidopa-levodopa (SINEMET)  MG tablet Take 1.5 tablets by mouth 2 times daily 0800 and 1600     cefTRIAXone (ROCEPHIN) 1 GM vial Inject 2 g (2,000 mg) into the vein daily for 21 days ESR,CRP,CBC with differential, creatinine, SGOT weekly while on this medication to be faxed to Dr. Rojas office.     cholecalciferol 50 MCG (2000 UT) tablet Take 1 tablet (50 mcg) by mouth daily     famotidine (PEPCID) 20 MG tablet Take 1 tablet (20 mg) by mouth daily At 0800     glycopyrrolate (ROBINUL) 1 MG tablet Take 1 tablet (1 mg) by mouth every morning     methocarbamol (ROBAXIN) 500 MG tablet Take 0.5 tablets (250 mg) by mouth 3 times daily as needed for muscle spasms     Multiple Vitamins-Minerals (HM MULTIVITAMIN ADULT GUMMY) CHEW Take 1 chew tab by mouth daily     polyvinyl alcohol (LIQUIFILM TEARS) 1.4 % ophthalmic solution Place 1 drop into both eyes every 4 hours as needed for dry eyes     QUEtiapine (SEROQUEL) 25 MG tablet Take 0.5 tablets (12.5 mg) by mouth nightly as needed (agitation)     senna-docusate (SENOKOT-S/PERICOLACE) 8.6-50 MG tablet Take 1 tablet by mouth daily as needed     No current facility-administered medications for this visit.       ROS:  10 point ROS of systems including Constitutional, Eyes, Respiratory, Cardiovascular, Gastroenterology, Genitourinary,  "Integumentary, Musculoskeletal, Psychiatric were all negative except for pertinent positives noted in my HPI.    Vitals:  BP (!) 158/81   Pulse 75   Temp 98.2  F (36.8  C)   Resp 18   Ht 1.549 m (5' 1\")   Wt 51.8 kg (114 lb 3.2 oz)   SpO2 92%   BMI 21.58 kg/m    Exam:  Thin appearing female, sitting at the bedside, preparing her breakfast.  In no distress  HEENT pharynx benign, oral mucosa moist  Lungs clear  Thoracic kyphosis  CV RRR, no murmur  Abdomen soft, nontender.  No suprapubic or CVA tenderness  Extremities without edema  Skin no rash  Neuro: Alert, oriented to person place and general circumstances.  No tremor or rigidity.  No focal weakness.  Gait was not assessed by me.      Lab/Diagnostic data:    Most Recent 3 CBC's:Recent Labs   Lab Test 11/23/22  1210 11/16/22  0600 11/12/22  0719 11/08/22  0705   WBC 4.9 6.8  --  8.2   HGB 11.5* 12.4  --  12.5   * 111*  --  107*   * 744* 502* 339     Most Recent 3 BMP's:Recent Labs   Lab Test 11/23/22  1210 11/16/22  0600 11/11/22  0539 11/10/22  2243 11/09/22  0643 11/08/22  0705    140  --   --   --  141   POTASSIUM 3.9 3.5 3.8  --    < > 4.0   CHLORIDE 105 100  --   --   --  103   CO2 29 28  --   --   --  30*   BUN 12 12  --   --   --  10.4   CR 0.52  0.51* 0.50*  0.55 0.51  --   --  0.53   ANIONGAP 4 12  --   --   --  8   GIOVANI 9.2 9.9  --   --   --  9.5   GLC 98 101*  --  134*  --  110*    < > = values in this interval not displayed.     Most Recent 2 LFT's:Recent Labs   Lab Test 11/23/22  1210 11/16/22  0600 08/31/22  1140   AST 17 20 30   ALT  --   --  7*   ALKPHOS  --   --  63   BILITOTAL  --   --  0.5     7-Day Micro Results     No results found for the last 168 hours.        Most Recent Urinalysis:Recent Labs   Lab Test 11/05/22  2309   COLOR Light Yellow   APPEARANCE Clear   URINEGLC Negative   URINEBILI Negative   URINEKETONE 10*   SG 1.014   UBLD Small*   URINEPH 6.5   PROTEIN 20*   NITRITE Negative   LEUKEST Negative   RBCU " 5*   WBCU 3           ASSESSMENT/PLAN:    Streptococcal bacteremia with mitral valve vegetation, consistent with endocarditis.  Patient clinically stable, on Rocephin through 12/4/2022  Plan: Continue Rocephin.  Routine lab monitoring.  Therapies.  ID follow-up.  Cardiology follow-up after course of antibiotics for repeat echocardiogram.    Pseudomonas UTI noted on admission to the hospital  Unclear patient was symptomatic.  She has completed therapy for this.  She notes mild  symptoms intermittently of dysuria  Plan: Monitor  status, assure adequate oral intake.  Obtain postvoid residual as needed.    Parkinson disease  History of frequent falls  Increased weakness in the setting of infection  Plan: Therapies.  Continue Sinemet.  Assure safe discharge to assisted living      Surinder Barber MD

## 2022-11-26 ENCOUNTER — TRANSITIONAL CARE UNIT VISIT (OUTPATIENT)
Dept: GERIATRICS | Facility: CLINIC | Age: 77
End: 2022-11-26
Payer: MEDICARE

## 2022-11-26 DIAGNOSIS — G20.A1 PARKINSON DISEASE (H): ICD-10-CM

## 2022-11-26 DIAGNOSIS — R78.81 BACTEREMIA: ICD-10-CM

## 2022-11-26 DIAGNOSIS — I05.9 ENDOCARDITIS OF MITRAL VALVE: Primary | ICD-10-CM

## 2022-11-26 PROCEDURE — 99305 1ST NF CARE MODERATE MDM 35: CPT | Performed by: INTERNAL MEDICINE

## 2022-11-26 NOTE — LETTER
11/26/2022        RE: Pablo Leo Miles  [address]  [facility] [address]        [facility] GERIATRICS    PRIMARY CARE PROVIDER AND CLINIC:  OSCAR Manuel CNP, [facility] [address]  / [facility] [address]  Chief Complaint   Patient presents with     Hospital F/U      [place] Medical Record Number:  [id number]  Place of Service where encounter took place:  [facility] ([facility])     Pablo Baez  is a 77 year old  (1945), admitted to the above facility from  [facility]. Hospital stay 11/5/22 through 11/12/22..         Hospital course was reviewed by me, is as per the hospital discharge summary and nurse practitioner note.    Patient has a past medical history of heart disease, past UTI, benign positional vertigo as well as cognitive impairment.  She was admitted to the hospital for the evaluation of weakness with recurrent falls.  She was found to have a low-grade temperature.  Urine culture grew Pseudomonas.  Blood cultures grew Streptococcus oralis.  LEN performed on 11/10/2022 revealed a small vegetation on her mitral valve.  Patient was initially treated with cefepime.  Vancomycin was stopped restarted and was transitioned to IV Rocephin.  She was discharged on IV Rocephin to complete a 3-week course.  She will need a repeat echocardiogram following antibiotic therapy.    Patient states she is feeling well.  She denies fevers, chills, nausea, vomiting, diarrhea.  She notes occasional mild dysuria which she attributes to applesauce, which improves when she drinks more water.  She is progressing in therapy, is walking with a walker.  Appetite has been good        CODE STATUS/ADVANCE DIRECTIVES DISCUSSION:  Prior  CPR/Full code CPR/Full code   ALLERGIES:   Allergies   Allergen Reactions     Lisinopril Cough      PAST MEDICAL HISTORY: Parkinson's disease, frequent UTI, cognitive impairment, GERD, frequent  falls  PAST SURGICAL HISTORY: Umbilical hernia repair, shoulder surgery, hip surgery following fracture, cervical spine fusion.  FAMILY HISTORY: Reviewed by me in epic, is noncontributory  SOCIAL HISTORY:   reports that she has never smoked. She has never used smokeless tobacco.  Patient's living condition: lives in an assisted living facility    Current medications were reviewed by me today:    Current Outpatient Medications   Medication Sig     acetaminophen (TYLENOL) 500 MG tablet Take 2 tablets (1,000 mg) by mouth 3 times daily At 0800, 1400, 2000     carbidopa-levodopa (SINEMET CR)  MG CR tablet Take 2 tablets by mouth At Bedtime     carbidopa-levodopa (SINEMET)  MG tablet Take 2 tablets by mouth 2 times daily At 12pm and 8pm     carbidopa-levodopa (SINEMET)  MG tablet Take 1.5 tablets by mouth 2 times daily 0800 and 1600     cefTRIAXone (ROCEPHIN) 1 GM vial Inject 2 g (2,000 mg) into the vein daily for 21 days ESR,CRP,CBC with differential, creatinine, SGOT weekly while on this medication to be faxed to Dr. Rojas office.     cholecalciferol 50 MCG (2000 UT) tablet Take 1 tablet (50 mcg) by mouth daily     famotidine (PEPCID) 20 MG tablet Take 1 tablet (20 mg) by mouth daily At 0800     glycopyrrolate (ROBINUL) 1 MG tablet Take 1 tablet (1 mg) by mouth every morning     methocarbamol (ROBAXIN) 500 MG tablet Take 0.5 tablets (250 mg) by mouth 3 times daily as needed for muscle spasms     Multiple Vitamins-Minerals (HM MULTIVITAMIN ADULT GUMMY) CHEW Take 1 chew tab by mouth daily     polyvinyl alcohol (LIQUIFILM TEARS) 1.4 % ophthalmic solution Place 1 drop into both eyes every 4 hours as needed for dry eyes     QUEtiapine (SEROQUEL) 25 MG tablet Take 0.5 tablets (12.5 mg) by mouth nightly as needed (agitation)     senna-docusate (SENOKOT-S/PERICOLACE) 8.6-50 MG tablet Take 1 tablet by mouth daily as needed     No current facility-administered medications for this visit.       ROS:  10 point  "ROS of systems including Constitutional, Eyes, Respiratory, Cardiovascular, Gastroenterology, Genitourinary, Integumentary, Musculoskeletal, Psychiatric were all negative except for pertinent positives noted in my HPI.    Vitals:  BP (!) 158/81   Pulse 75   Temp 98.2  F (36.8  C)   Resp 18   Ht 1.549 m (5' 1\")   Wt 51.8 kg (114 lb 3.2 oz)   SpO2 92%   BMI 21.58 kg/m    Exam:  Thin appearing female, sitting at the bedside, preparing her breakfast.  In no distress  HEENT pharynx benign, oral mucosa moist  Lungs clear  Thoracic kyphosis  CV RRR, no murmur  Abdomen soft, nontender.  No suprapubic or CVA tenderness  Extremities without edema  Skin no rash  Neuro: Alert, oriented to person place and general circumstances.  No tremor or rigidity.  No focal weakness.  Gait was not assessed by me.      Lab/Diagnostic data:    Most Recent 3 CBC's:Recent Labs   Lab Test 11/23/22  1210 11/16/22  0600 11/12/22  0719 11/08/22  0705   WBC 4.9 6.8  --  8.2   HGB 11.5* 12.4  --  12.5   * 111*  --  107*   * 744* 502* 339     Most Recent 3 BMP's:Recent Labs   Lab Test 11/23/22  1210 11/16/22  0600 11/11/22  0539 11/10/22  2243 11/09/22  0643 11/08/22  0705    140  --   --   --  141   POTASSIUM 3.9 3.5 3.8  --    < > 4.0   CHLORIDE 105 100  --   --   --  103   CO2 29 28  --   --   --  30*   BUN 12 12  --   --   --  10.4   CR 0.52  0.51* 0.50*  0.55 0.51  --   --  0.53   ANIONGAP 4 12  --   --   --  8   GIOVANI 9.2 9.9  --   --   --  9.5   GLC 98 101*  --  134*  --  110*    < > = values in this interval not displayed.     Most Recent 2 LFT's:Recent Labs   Lab Test 11/23/22  1210 11/16/22  0600 08/31/22  1140   AST 17 20 30   ALT  --   --  7*   ALKPHOS  --   --  63   BILITOTAL  --   --  0.5     7-Day Micro Results     No results found for the last 168 hours.        Most Recent Urinalysis:Recent Labs   Lab Test 11/05/22  2309   COLOR Light Yellow   APPEARANCE Clear   URINEGLC Negative   URINEBILI Negative "   URINEKETONE 10*   SG 1.014   UBLD Small*   URINEPH 6.5   PROTEIN 20*   NITRITE Negative   LEUKEST Negative   RBCU 5*   WBCU 3           ASSESSMENT/PLAN:    Streptococcal bacteremia with mitral valve vegetation, consistent with endocarditis.  Patient clinically stable, on Rocephin through 12/4/2022  Plan: Continue Rocephin.  Routine lab monitoring.  Therapies.  ID follow-up.  Cardiology follow-up after course of antibiotics for repeat echocardiogram.    Pseudomonas UTI noted on admission to the hospital  Unclear patient was symptomatic.  She has completed therapy for this.  She notes mild  symptoms intermittently of dysuria  Plan: Monitor  status, assure adequate oral intake.  Obtain postvoid residual as needed.    Parkinson disease  History of frequent falls  Increased weakness in the setting of infection  Plan: Therapies.  Continue Sinemet.  Assure safe discharge to assisted living      Surinder Barber MD                Sincerely,        Surinder Barber MD

## 2022-11-29 ENCOUNTER — LAB REQUISITION (OUTPATIENT)
Dept: LAB | Facility: CLINIC | Age: 77
End: 2022-11-29
Payer: MEDICARE

## 2022-11-29 DIAGNOSIS — B99.9 UNSPECIFIED INFECTIOUS DISEASE: ICD-10-CM

## 2022-11-30 ENCOUNTER — TRANSITIONAL CARE UNIT VISIT (OUTPATIENT)
Dept: GERIATRICS | Facility: CLINIC | Age: 77
End: 2022-11-30
Payer: MEDICARE

## 2022-11-30 VITALS
DIASTOLIC BLOOD PRESSURE: 71 MMHG | SYSTOLIC BLOOD PRESSURE: 118 MMHG | HEART RATE: 61 BPM | WEIGHT: 116.2 LBS | TEMPERATURE: 98.3 F | OXYGEN SATURATION: 96 % | BODY MASS INDEX: 21.94 KG/M2 | HEIGHT: 61 IN | RESPIRATION RATE: 18 BRPM

## 2022-11-30 DIAGNOSIS — N39.0 URINARY TRACT INFECTION WITHOUT HEMATURIA, SITE UNSPECIFIED: Primary | ICD-10-CM

## 2022-11-30 DIAGNOSIS — M62.81 GENERALIZED MUSCLE WEAKNESS: ICD-10-CM

## 2022-11-30 DIAGNOSIS — G20.A1 PARKINSON DISEASE (H): ICD-10-CM

## 2022-11-30 DIAGNOSIS — R78.81 BACTEREMIA: ICD-10-CM

## 2022-11-30 DIAGNOSIS — I05.9 ENDOCARDITIS OF MITRAL VALVE: ICD-10-CM

## 2022-11-30 LAB
ANION GAP SERPL CALCULATED.3IONS-SCNC: 7 MMOL/L (ref 3–14)
AST SERPL W P-5'-P-CCNC: 17 U/L (ref 0–45)
BASOPHILS # BLD AUTO: 0.1 10E3/UL (ref 0–0.2)
BASOPHILS NFR BLD AUTO: 1 %
BUN SERPL-MCNC: 17 MG/DL (ref 7–30)
CALCIUM SERPL-MCNC: 9.6 MG/DL (ref 8.5–10.1)
CHLORIDE BLD-SCNC: 105 MMOL/L (ref 94–109)
CO2 SERPL-SCNC: 29 MMOL/L (ref 20–32)
CREAT SERPL-MCNC: 0.48 MG/DL (ref 0.52–1.04)
CRP SERPL-MCNC: <2.9 MG/L (ref 0–8)
EOSINOPHIL # BLD AUTO: 0.1 10E3/UL (ref 0–0.7)
EOSINOPHIL NFR BLD AUTO: 3 %
ERYTHROCYTE [DISTWIDTH] IN BLOOD BY AUTOMATED COUNT: 13.5 % (ref 10–15)
ERYTHROCYTE [SEDIMENTATION RATE] IN BLOOD BY WESTERGREN METHOD: 6 MM/HR (ref 0–30)
GFR SERPL CREATININE-BSD FRML MDRD: >90 ML/MIN/1.73M2
GLUCOSE BLD-MCNC: 106 MG/DL (ref 70–99)
HCT VFR BLD AUTO: 38.3 % (ref 35–47)
HGB BLD-MCNC: 12.1 G/DL (ref 11.7–15.7)
IMM GRANULOCYTES # BLD: 0 10E3/UL
IMM GRANULOCYTES NFR BLD: 0 %
LYMPHOCYTES # BLD AUTO: 1 10E3/UL (ref 0.8–5.3)
LYMPHOCYTES NFR BLD AUTO: 22 %
MCH RBC QN AUTO: 33.7 PG (ref 26.5–33)
MCHC RBC AUTO-ENTMCNC: 31.6 G/DL (ref 31.5–36.5)
MCV RBC AUTO: 107 FL (ref 78–100)
MONOCYTES # BLD AUTO: 0.3 10E3/UL (ref 0–1.3)
MONOCYTES NFR BLD AUTO: 6 %
NEUTROPHILS # BLD AUTO: 3.1 10E3/UL (ref 1.6–8.3)
NEUTROPHILS NFR BLD AUTO: 68 %
NRBC # BLD AUTO: 0 10E3/UL
NRBC BLD AUTO-RTO: 0 /100
PLATELET # BLD AUTO: 344 10E3/UL (ref 150–450)
POTASSIUM BLD-SCNC: 3.6 MMOL/L (ref 3.4–5.3)
RBC # BLD AUTO: 3.59 10E6/UL (ref 3.8–5.2)
SODIUM SERPL-SCNC: 141 MMOL/L (ref 133–144)
WBC # BLD AUTO: 4.6 10E3/UL (ref 4–11)

## 2022-11-30 PROCEDURE — 86140 C-REACTIVE PROTEIN: CPT | Performed by: NURSE PRACTITIONER

## 2022-11-30 PROCEDURE — 84450 TRANSFERASE (AST) (SGOT): CPT | Performed by: NURSE PRACTITIONER

## 2022-11-30 PROCEDURE — 80048 BASIC METABOLIC PNL TOTAL CA: CPT | Performed by: NURSE PRACTITIONER

## 2022-11-30 PROCEDURE — 85652 RBC SED RATE AUTOMATED: CPT | Performed by: NURSE PRACTITIONER

## 2022-11-30 PROCEDURE — 36415 COLL VENOUS BLD VENIPUNCTURE: CPT | Performed by: NURSE PRACTITIONER

## 2022-11-30 PROCEDURE — 85004 AUTOMATED DIFF WBC COUNT: CPT | Performed by: NURSE PRACTITIONER

## 2022-11-30 PROCEDURE — 99309 SBSQ NF CARE MODERATE MDM 30: CPT | Performed by: NURSE PRACTITIONER

## 2022-11-30 NOTE — PROGRESS NOTES
[facility]  [place] Medical Record Number:  [id number]  Place of Service where encounter took place:  [facility] ([facility]) [[id number]]  Chief Complaint   Patient presents with     Nursing Home Acute       HPI:    Pablo Baez  is a 77 year old (1945), who is being seen today for an episodic care visit.  HPI information obtained from: facility chart records, facility staff and patient report. Today's concern is:    Patient Pablo Baez is a 77 yr old female admitted to [facility] for rehabilitation s/p hospitalization [facility] 11/5-11/12/22 for pseudomonas urinary tract infection, streptococcus oralis bacteremia, endocarditis and falls    PMHX parkinsons disease, UTI, BPPV, cognitive impairment  Lives in half-way       Urinary tract infection without hematuria, site unspecified  Endocarditis of mitral valve  Bacteremia  Generalized muscle weakness  Parkinson disease (H)     Patient reports she feels fine with no acute concerns  Vitals stable room air  States she likes the food and eating fine  Reports regular elimination  Tolerating IV antibiotics, continues with weekly labs and stable  Patient states she wants to go home after IV antibiotics completed next week   Patient therapies report  SLUMS 23/30, transfers CGA/SBA, ambulates 160ft CGA, Max A with toileting, Min A with UE dressing, Max A with LE dressing   Discharge plan and any ID barriers to discharge: IV antibiotics through 12/4/22    Past Medical and Surgical History reviewed in Epic today.    MEDICATIONS:    Current Outpatient Medications   Medication Sig Dispense Refill     acetaminophen (TYLENOL) 500 MG tablet Take 2 tablets (1,000 mg) by mouth 3 times daily At 0800, 1400, 2000       carbidopa-levodopa (SINEMET CR)  MG CR tablet Take 2 tablets by mouth At Bedtime       carbidopa-levodopa (SINEMET)  MG tablet Take 2 tablets by mouth 2 times daily At 12pm and 8pm        "carbidopa-levodopa (SINEMET)  MG tablet Take 1.5 tablets by mouth 2 times daily 0800 and 1600       cefTRIAXone (ROCEPHIN) 1 GM vial Inject 2 g (2,000 mg) into the vein daily for 21 days ESR,CRP,CBC with differential, creatinine, SGOT weekly while on this medication to be faxed to Dr. Rojas office. 600 mL 0     cholecalciferol 50 MCG (2000 UT) tablet Take 1 tablet (50 mcg) by mouth daily       famotidine (PEPCID) 20 MG tablet Take 1 tablet (20 mg) by mouth daily At 0800       glycopyrrolate (ROBINUL) 1 MG tablet Take 1 tablet (1 mg) by mouth every morning       methocarbamol (ROBAXIN) 500 MG tablet Take 0.5 tablets (250 mg) by mouth 3 times daily as needed for muscle spasms       Multiple Vitamins-Minerals (HM MULTIVITAMIN ADULT GUMMY) CHEW Take 1 chew tab by mouth daily       polyvinyl alcohol (LIQUIFILM TEARS) 1.4 % ophthalmic solution Place 1 drop into both eyes every 4 hours as needed for dry eyes       QUEtiapine (SEROQUEL) 25 MG tablet Take 0.5 tablets (12.5 mg) by mouth nightly as needed (agitation)       senna-docusate (SENOKOT-S/PERICOLACE) 8.6-50 MG tablet Take 1 tablet by mouth daily as needed           REVIEW OF SYSTEMS:  4 point ROS including Respiratory, CV, GI and , other than that noted in the HPI,  is negative    Objective:  /71   Pulse 61   Temp 98.3  F (36.8  C)   Resp 18   Ht 1.549 m (5' 1\")   Wt 52.7 kg (116 lb 3.2 oz)   SpO2 96%   BMI 21.96 kg/m    Exam:  GENERAL APPEARANCE:  Alert, in no distress  RESP:  respiratory effort and palpation of chest normal, lungs clear to auscultation , no respiratory distress  CV:  Palpation and auscultation of heart done , regular rate and rhythm, no murmur, rub, or gallop  ABDOMEN:  normal bowel sounds, soft, nontender, no hepatosplenomegaly or other masses  M/S:   sitting in WC  SKIN:  Inspection of skin and subcutaneous tissue IV intact right arm  PSYCH:  oriented X 3    Labs:   Labs done in SNF are in [facility] EPIC. Please refer to them " using [facility] Everywhere.     Last Comprehensive Metabolic Panel:  Sodium   Date Value Ref Range Status   11/30/2022 141 133 - 144 mmol/L Final     Potassium   Date Value Ref Range Status   11/30/2022 3.6 3.4 - 5.3 mmol/L Final     Chloride   Date Value Ref Range Status   11/30/2022 105 94 - 109 mmol/L Final     Carbon Dioxide (CO2)   Date Value Ref Range Status   11/30/2022 29 20 - 32 mmol/L Final     Anion Gap   Date Value Ref Range Status   11/30/2022 7 3 - 14 mmol/L Final     Glucose   Date Value Ref Range Status   11/30/2022 106 (H) 70 - 99 mg/dL Final     Urea Nitrogen   Date Value Ref Range Status   11/30/2022 17 7 - 30 mg/dL Final     Creatinine   Date Value Ref Range Status   11/30/2022 0.48 (L) 0.52 - 1.04 mg/dL Final     GFR Estimate   Date Value Ref Range Status   11/30/2022 >90 >60 mL/min/1.73m2 Final     Comment:     Effective December 21, 2021 eGFRcr in adults is calculated using the 2021 CKD-EPI creatinine equation which includes age and gender (Antonio et al., NEJ, DOI: 10.1056/EVCEyu6075859)     Calcium   Date Value Ref Range Status   11/30/2022 9.6 8.5 - 10.1 mg/dL Final       Lab Results   Component Value Date    WBC 4.6 11/30/2022     Lab Results   Component Value Date    RBC 3.59 11/30/2022     Lab Results   Component Value Date    HGB 12.1 11/30/2022     Lab Results   Component Value Date    HCT 38.3 11/30/2022     No components found for: MCT  Lab Results   Component Value Date     11/30/2022     Lab Results   Component Value Date    MCH 33.7 11/30/2022     Lab Results   Component Value Date    MCHC 31.6 11/30/2022     Lab Results   Component Value Date    RDW 13.5 11/30/2022     Lab Results   Component Value Date     11/30/2022         ASSESSMENT/PLAN:     Urinary tract infection without hematuria, site unspecified  Endocarditis of mitral valve  Bacteremia  Generalized muscle weakness  Parkinson disease (H)    Patient reports she feels fine with no acute concerns  Vitals  stable room air  States she likes the food and eating fine  Reports regular elimination    Tolerating IV antibiotics, continues with weekly labs and stable  Patient states she wants to go home after IV antibiotics completed next week     Patient therapies report  SLUMS 23/30, transfers CGA/SBA, ambulates 160ft CGA, Max A with toileting, Min A with UE dressing, Max A with LE dressing   Discharge plan and any ID barriers to discharge: IV antibiotics through 12/4/22  Continue therapies at this time   involved in safe plan home     Appointments in Next Year    Dec 08, 2022  1:30 PM  ECHO COMPLETE with [id number]  [facility] ([facility] ) [phone]   Jan 05, 2023  1:30 PM  (Arrive by 1:25 PM)  Return Cardiology with OSCAR Ivan CNP  [facility] [place] ([facility] ) [phone]            Electronically signed by:  OSCAR Garduno CNP

## 2022-12-05 ENCOUNTER — TELEPHONE (OUTPATIENT)
Dept: GERIATRICS | Facility: CLINIC | Age: 77
End: 2022-12-05

## 2022-12-05 NOTE — TELEPHONE ENCOUNTER
[facility] NON-EMERGENT VOICEMAIL ENCOUNTER    Pablo Baez is a 77 year old  (1945), Voicemail Message received today regarding:   Unwitnessed fall    Disposition    Vm left on the after hours line that patient had a fall on 12/3.  No injuries sustained.  Nurse states that patient lost her balance.  No other information given.      Requests    FYI      Electronically signed by:   Ericka Tiwari RN

## 2022-12-06 VITALS
RESPIRATION RATE: 18 BRPM | TEMPERATURE: 97.7 F | OXYGEN SATURATION: 95 % | BODY MASS INDEX: 21.83 KG/M2 | SYSTOLIC BLOOD PRESSURE: 164 MMHG | WEIGHT: 115.6 LBS | HEIGHT: 61 IN | DIASTOLIC BLOOD PRESSURE: 72 MMHG | HEART RATE: 63 BPM

## 2022-12-06 NOTE — PROGRESS NOTES
[facility] DISCHARGE SUMMARY  PATIENT'S NAME: Pablo Baez  YOB: 1945  MEDICAL RECORD NUMBER:  [id number]  Place of Service where encounter took place:  [facility] ([facility]) [[id number]]    PRIMARY CARE PROVIDER AND CLINIC RESPONSIBLE AFTER TRANSFER:   OSCAR Manuel CNP, [facility] [address]  / [facility] [address]    Non-FMG Provider     Transferring providers: OSCAR Garduno CNP, MD Elisabeth  Recent Hospitalization/ED:  [facility] Hospital stay 11/5/22 to 11/12/22  Date of SNF Admission: November 12, 2022  Date of SNF (anticipated) Discharge: December 09, 2022  Discharged to: with family   Cognitive Scores/Physical Function/DME:   SLUMS 23/30, transfers CGA/SBA, ambulates 160ft CGA, Max A with toileting, Min A with UE dressing, Max A with LE dressing      CODE STATUS/ADVANCE DIRECTIVES DISCUSSION:  Full Code   ALLERGIES: Lisinopril    DISCHARGE DIAGNOSIS/NURSING FACILITY COURSE:     Patient Pablo Baez is a 77 yr old female admitted to [facility] for rehabilitation s/p hospitalization [facility] 11/5-11/12/22 for pseudomonas urinary tract infection, streptococcus oralis bacteremia, endocarditis and falls    PMHX parkinsons disease, UTI, BPPV, cognitive impairment  Lives in [facility]      Pseudomonas UTI  Streptococcus Oralis Bacteremia  Endocarditis   repeat blood cultures have been NGTD  LEN performed on 11/10 revealed mitral valve with likely small vegetations  completed 3 week course IV Ceftriaxone per infectious disease and cardiology    weekly labs: ESR,CRP,CBC with differential, creatinine, SGOT weekly while on this medication to be faxed to Dr. Rojas office.  Follow up echocardiogram after antibiotic course and Cardiology clinic   Appointment 12/8 130pm Echo: [facility] [address] [phone] Friend to transport p/u 1230pm  Lovenox stopped due  to mobile  No complications, patient states she feels good     Recurrent Falls  Close Head Injury  Hx Parkinson's Disease with Gait Instability   Recurrent falls per hospital report:   -ED on 11/1 with laceration repair to the right scalp with staples and right eyebrow repair with sutures.  CT head with scalp hematomas and c/s was negative and discharge back to [facility].    -ED 11/5 am after a mechanical fall with laceration to her left parietal scalp with 2 staples placed and discharge back to her snf.  CT head and C/S with no acute abnormality.  -ED 11/5 later in the day at her [facility] with another fall with generalized weakness and found to have a low grade fever of 100.1.  A Laceration of the left posterior scalp was repaired with staples.  - right eyebrow sutures removed on 11/9   - right scalp staples were removed on 11/11  - left scalp staples to be removed around 11/15  No complications     Parkinson's Disease   Cognitive Impairment  parkinson's disease diagnosed 10/2018   baseline gait instability, orofacial dyskinesia and hallucinations    SLUMS 21/30  Continue Sinemet, Robinul   Not used prn Seroquel and discontinued  Nuplazid stopped in hospital  Follow up neurologist Dr. Hinojosa as advised    Dysphagia  Reports difficulty swallowing at times  speech therapy follow while at TCU, given swallow strategies  Regular diet, Advanced (DD3) texture, Nectar consistency     Hx Chronic Neck Pain   h/o cervical spondylosis s/p cervical fusion.  CT c/s with no acute abnormalities  Reports pain managed  Continue Tylenol and Robaxin as needed (has not been using)     Hx Cdiff   hx of cdiff in 2021  No recent diarrhea  Per ID and no prophylactic vancomycin is needed    Deconditioned  Comment: d/t recent hospitalization & comorbidity, expect delay rehabilitation d/t age & comorbidity  Plan: PT/OT eval & treat, monitor  Plans to discharge home with homecare    Advanced care planning  Wishes to be FULL code      Past Medical  "History:  has no past medical history on file.    Discharge Medications:    Current Outpatient Medications   Medication Sig Dispense Refill     acetaminophen (TYLENOL) 500 MG tablet Take 2 tablets (1,000 mg) by mouth 3 times daily At 0800, 1400, 2000       carbidopa-levodopa (SINEMET CR)  MG CR tablet Take 2 tablets by mouth At Bedtime       carbidopa-levodopa (SINEMET)  MG tablet Take 2 tablets by mouth 2 times daily At 12pm and 8pm       carbidopa-levodopa (SINEMET)  MG tablet Take 1.5 tablets by mouth 2 times daily 0800 and 1600       cholecalciferol 50 MCG (2000 UT) tablet Take 1 tablet (50 mcg) by mouth daily       famotidine (PEPCID) 20 MG tablet Take 1 tablet (20 mg) by mouth daily At 0800       glycopyrrolate (ROBINUL) 1 MG tablet Take 1 tablet (1 mg) by mouth every morning       methocarbamol (ROBAXIN) 500 MG tablet Take 0.5 tablets (250 mg) by mouth 3 times daily as needed for muscle spasms       Multiple Vitamins-Minerals (HM MULTIVITAMIN ADULT GUMMY) CHEW Take 1 chew tab by mouth daily       polyvinyl alcohol (LIQUIFILM TEARS) 1.4 % ophthalmic solution Place 1 drop into both eyes every 4 hours as needed for dry eyes       senna-docusate (SENOKOT-S/PERICOLACE) 8.6-50 MG tablet Take 1 tablet by mouth daily as needed         Medication Changes/Rationale:     See HPI    Controlled medications sent with patient:   not applicable/none     ROS:   4 point ROS including Respiratory, CV, GI and , other than that noted in the HPI,  is negative    Physical Exam:   Vitals: BP (!) 164/72   Pulse 63   Temp 97.7  F (36.5  C)   Resp 18   Ht 1.549 m (5' 1\")   Wt 52.4 kg (115 lb 9.6 oz)   SpO2 95%   BMI 21.84 kg/m    BMI= Body mass index is 21.84 kg/m .  GENERAL APPEARANCE:  Alert, in no distress  RESP:  respiratory effort and palpation of chest normal, lungs clear to auscultation , no respiratory distress  CV:  Palpation and auscultation of heart done , regular rate and rhythm, no murmur, rub, " or gallop  ABDOMEN:  normal bowel sounds, soft, nontender, no hepatosplenomegaly or other masses  M/S:   sitting in WC  SKIN:  Inspection of skin and subcutaneous tissue baseline  PSYCH:  oriented to 3     SNF labs: Labs done in SNF are in [facility] EPIC. Please refer to them using EPIC/Care Everywhere.    Last Comprehensive Metabolic Panel:  Sodium   Date Value Ref Range Status   11/30/2022 141 133 - 144 mmol/L Final     Potassium   Date Value Ref Range Status   11/30/2022 3.6 3.4 - 5.3 mmol/L Final     Chloride   Date Value Ref Range Status   11/30/2022 105 94 - 109 mmol/L Final     Carbon Dioxide (CO2)   Date Value Ref Range Status   11/30/2022 29 20 - 32 mmol/L Final     Anion Gap   Date Value Ref Range Status   11/30/2022 7 3 - 14 mmol/L Final     Glucose   Date Value Ref Range Status   11/30/2022 106 (H) 70 - 99 mg/dL Final     Urea Nitrogen   Date Value Ref Range Status   11/30/2022 17 7 - 30 mg/dL Final     Creatinine   Date Value Ref Range Status   11/30/2022 0.48 (L) 0.52 - 1.04 mg/dL Final     GFR Estimate   Date Value Ref Range Status   11/30/2022 >90 >60 mL/min/1.73m2 Final     Comment:     Effective December 21, 2021 eGFRcr in adults is calculated using the 2021 CKD-EPI creatinine equation which includes age and gender (Antonio et al., NEJ, DOI: 10.1056/WSJGqp9002475)     Calcium   Date Value Ref Range Status   11/30/2022 9.6 8.5 - 10.1 mg/dL Final       Lab Results   Component Value Date    WBC 4.6 11/30/2022     Lab Results   Component Value Date    RBC 3.59 11/30/2022     Lab Results   Component Value Date    HGB 12.1 11/30/2022     Lab Results   Component Value Date    HCT 38.3 11/30/2022     No components found for: MCT  Lab Results   Component Value Date     11/30/2022     Lab Results   Component Value Date    MCH 33.7 11/30/2022     Lab Results   Component Value Date    MCHC 31.6 11/30/2022     Lab Results   Component Value Date    RDW 13.5 11/30/2022     Lab Results   Component Value Date      11/30/2022         DISCHARGE PLAN:    Follow up labs: No labs orders/due    Medical Follow Up:      Follow up with primary care provider in 1-2 weeks    MTM referral needed and placed by this provider: No    Current [facility] scheduled appointments:       Discharge Services: Home Care:  Occupational Therapy, Physical Therapy, Registered Nurse and Home Health Aide        TOTAL DISCHARGE TIME:   Greater than 30 minutes  Electronically signed by:  OSCAR Garduno CNP     Home care Face to Face documentation done in [facility] attached to Home care orders for homecare.

## 2022-12-07 ENCOUNTER — TRANSITIONAL CARE UNIT VISIT (OUTPATIENT)
Dept: GERIATRICS | Facility: CLINIC | Age: 77
End: 2022-12-07
Payer: MEDICARE

## 2022-12-07 DIAGNOSIS — N39.0 URINARY TRACT INFECTION WITHOUT HEMATURIA, SITE UNSPECIFIED: Primary | ICD-10-CM

## 2022-12-07 DIAGNOSIS — M62.81 GENERALIZED MUSCLE WEAKNESS: ICD-10-CM

## 2022-12-07 DIAGNOSIS — I05.9 ENDOCARDITIS OF MITRAL VALVE: ICD-10-CM

## 2022-12-07 DIAGNOSIS — R78.81 BACTEREMIA: ICD-10-CM

## 2022-12-07 PROCEDURE — 99309 SBSQ NF CARE MODERATE MDM 30: CPT | Performed by: NURSE PRACTITIONER

## 2022-12-08 ENCOUNTER — HOSPITAL ENCOUNTER (OUTPATIENT)
Dept: CARDIOLOGY | Facility: CLINIC | Age: 77
Discharge: HOME | End: 2022-12-08
Attending: NURSE PRACTITIONER | Admitting: NURSE PRACTITIONER
Payer: MEDICARE

## 2022-12-08 ENCOUNTER — LAB REQUISITION (OUTPATIENT)
Dept: LAB | Facility: CLINIC | Age: 77
End: 2022-12-08
Payer: MEDICARE

## 2022-12-08 DIAGNOSIS — I33.0 BACTERIAL ENDOCARDITIS, UNSPECIFIED CHRONICITY: ICD-10-CM

## 2022-12-08 DIAGNOSIS — R78.81 BACTEREMIA: ICD-10-CM

## 2022-12-08 PROCEDURE — 93308 TTE F-UP OR LMTD: CPT

## 2022-12-08 PROCEDURE — 93325 DOPPLER ECHO COLOR FLOW MAPG: CPT

## 2022-12-08 PROCEDURE — 93321 DOPPLER ECHO F-UP/LMTD STD: CPT | Mod: 26 | Performed by: INTERNAL MEDICINE

## 2022-12-08 PROCEDURE — 93325 DOPPLER ECHO COLOR FLOW MAPG: CPT | Mod: 26 | Performed by: INTERNAL MEDICINE

## 2022-12-08 PROCEDURE — 93308 TTE F-UP OR LMTD: CPT | Mod: 26 | Performed by: INTERNAL MEDICINE

## 2022-12-09 PROCEDURE — P9604 ONE-WAY ALLOW PRORATED TRIP: HCPCS | Performed by: NURSE PRACTITIONER

## 2022-12-09 PROCEDURE — 36415 COLL VENOUS BLD VENIPUNCTURE: CPT | Performed by: NURSE PRACTITIONER

## 2022-12-09 PROCEDURE — 87103 BLOOD FUNGUS CULTURE: CPT | Performed by: NURSE PRACTITIONER

## 2022-12-12 ENCOUNTER — TRANSITIONAL CARE UNIT VISIT (OUTPATIENT)
Dept: GERIATRICS | Facility: CLINIC | Age: 77
End: 2022-12-12
Payer: MEDICARE

## 2022-12-12 VITALS
RESPIRATION RATE: 18 BRPM | OXYGEN SATURATION: 98 % | BODY MASS INDEX: 21.94 KG/M2 | SYSTOLIC BLOOD PRESSURE: 166 MMHG | DIASTOLIC BLOOD PRESSURE: 74 MMHG | HEIGHT: 61 IN | WEIGHT: 116.2 LBS | TEMPERATURE: 97 F | HEART RATE: 58 BPM

## 2022-12-12 DIAGNOSIS — G20.A1 PARKINSON DISEASE (H): ICD-10-CM

## 2022-12-12 DIAGNOSIS — R78.81 BACTEREMIA: ICD-10-CM

## 2022-12-12 DIAGNOSIS — M62.81 GENERALIZED MUSCLE WEAKNESS: ICD-10-CM

## 2022-12-12 DIAGNOSIS — I05.9 ENDOCARDITIS OF MITRAL VALVE: ICD-10-CM

## 2022-12-12 DIAGNOSIS — N39.0 URINARY TRACT INFECTION WITHOUT HEMATURIA, SITE UNSPECIFIED: Primary | ICD-10-CM

## 2022-12-12 PROCEDURE — 99316 NF DSCHRG MGMT 30 MIN+: CPT | Performed by: NURSE PRACTITIONER

## 2022-12-12 NOTE — PROGRESS NOTES
[place] [facility] DISCHARGE SUMMARY  PATIENT'S NAME: Pablo Beaz  YOB: 1945  MEDICAL RECORD NUMBER:  [id number]  Place of Service where encounter took place:  [facility] [[id number]]    PRIMARY CARE PROVIDER AND CLINIC RESPONSIBLE AFTER TRANSFER:   OSCAR Manuel CNP, [employer] [address]  / [facility] [address]    Non-FMG Provider     Transferring providers: OSCAR Garduno CNP; Surinder Barber MD  Recent Hospitalization/ED:  [facility] Hospital stay 11/5/22 to 11/12/22.  Date of SNF Admission: November 12, 2022  Date of SNF (anticipated) Discharge: December 14, 2022  Discharged to: previous location, [facility]  Cognitive Scores/Physical Function/DME:  SLUMS 23/30, transfers CGA/SBA, ambulates 160ft CGA, Max A with toileting, Min A with UE dressing, Max A with LE dressing    CODE STATUS/ADVANCE DIRECTIVES DISCUSSION:  Full Code   ALLERGIES: Lisinopril    DISCHARGE DIAGNOSIS/NURSING FACILITY COURSE:   Patient Pablo Baez is a 77 yr old female admitted to [facility] for rehabilitation s/p hospitalization [facility] 11/5-11/12/22 for pseudomonas urinary tract infection, streptococcus oralis bacteremia, endocarditis and falls    PMHX parkinsons disease, UTI, BPPV, cognitive impairment  Lives in [facility]      Pseudomonas UTI  Streptococcus Oralis Bacteremia  Endocarditis   repeat blood cultures have been NGTD  LEN performed on 11/10 revealed mitral valve with likely small vegetations  Completed 3 week course IV Ceftriaxone per infectious disease and cardiology    Continue weekly labs: ESR,CRP,CBC with differential, creatinine, SGOT weekly while on this medication to be faxed to Dr. Rojas office  Follow up echocardiogram after antibiotic course and Cardiology clinic. Had Appointment 12/8 130pm Echo: [facility]   Clarification out to infectious disease on follow  up and removal of PICC  Completed course of Lovenox  Had planned to discharge last Friday and discharge pushed to this week per [facility] request and clarification needed from infectious disease on removal of PICC and follow up. Did get okay for removal of PICC today per infectious disease      Recurrent Falls  Close Head Injury  Hx Parkinson's Disease with Gait Instability   Recurrent falls per hospital report:   -ED on 11/1 with laceration repair to the right scalp with staples and right eyebrow repair with sutures.  CT head with scalp hematomas and c/s was negative and discharge back to [facility].    -ED 11/5 am after a mechanical fall with laceration to her left parietal scalp with 2 staples placed and discharge back to her [facility].  CT head and C/S with no acute abnormality.  -ED 11/5 later in the day at her [facility] with another fall with generalized weakness and found to have a low grade fever of 100.1.  A Laceration of the left posterior scalp was repaired with staples.  - right eyebrow sutures removed on 11/9   - right scalp staples were removed on 11/11  - left scalp staples removed around 11/15     Parkinson's Disease  Cognitive Impairment  parkinson's disease diagnosed 10/2018.    baseline gait instability, orofacial dyskinesia and hallucinations    SLUMS 21/30  Continue Sinemet, Robinul and as needed Seroquel.   Nuplazid stopped in hospital  Follow up neurologist Dr. Hinojosa as advised    Dysphagia  Reports difficulty swallowing at times  speech therapy followed while at TCU  Regular diet, Advanced (DD3) texture, Nectar consistency      Hx Chronic Neck Pain   h/o cervical spondylosis s/p cervical fusion.  CT c/s with no acute abnormalities  Reports pain managed  Continue Tylenol and Robaxin as needed      Hx Cdiff   hx of cdiff in 2021  No recent diarrhea  Per ID and no prophylactic vancomycin is needed    Advanced care planning  Wishes to be FULL code      Past Medical History:  has no past medical history on  "file.    Discharge Medications:    Current Outpatient Medications   Medication Sig Dispense Refill     acetaminophen (TYLENOL) 500 MG tablet Take 2 tablets (1,000 mg) by mouth 3 times daily At 0800, 1400, 2000       carbidopa-levodopa (SINEMET CR)  MG CR tablet Take 2 tablets by mouth At Bedtime       carbidopa-levodopa (SINEMET)  MG tablet Take 2 tablets by mouth 2 times daily At 12pm and 8pm       carbidopa-levodopa (SINEMET)  MG tablet Take 1.5 tablets by mouth 2 times daily 0800 and 1600       cholecalciferol 50 MCG (2000 UT) tablet Take 1 tablet (50 mcg) by mouth daily       famotidine (PEPCID) 20 MG tablet Take 1 tablet (20 mg) by mouth daily At 0800       glycopyrrolate (ROBINUL) 1 MG tablet Take 1 tablet (1 mg) by mouth every morning       methocarbamol (ROBAXIN) 500 MG tablet Take 0.5 tablets (250 mg) by mouth 3 times daily as needed for muscle spasms       Multiple Vitamins-Minerals (HM MULTIVITAMIN ADULT GUMMY) CHEW Take 1 chew tab by mouth daily       polyvinyl alcohol (LIQUIFILM TEARS) 1.4 % ophthalmic solution Place 1 drop into both eyes every 4 hours as needed for dry eyes       senna-docusate (SENOKOT-S/PERICOLACE) 8.6-50 MG tablet Take 1 tablet by mouth daily as needed         Medication Changes/Rationale:         Controlled medications sent with patient:   not applicable/none     ROS:   4 point ROS including Respiratory, CV, GI and , other than that noted in the HPI,  is negative    Physical Exam:   Vitals: BP (!) 166/74   Pulse 58   Temp 97  F (36.1  C)   Resp 18   Ht 1.549 m (5' 1\")   Wt 52.7 kg (116 lb 3.2 oz)   SpO2 98%   BMI 21.96 kg/m    BMI= Body mass index is 21.96 kg/m .  GENERAL APPEARANCE:  Alert, in no distress  RESP:  respiratory effort and palpation of chest normal, lungs clear to auscultation , no respiratory distress  CV:  Palpation and auscultation of heart done , regular rate and rhythm, no murmur, rub, or gallop  ABDOMEN:  normal bowel sounds, soft, " nontender, no hepatosplenomegaly or other masses  M/S:   lying in bed  SKIN:  Inspection of skin and subcutaneous tissue baseline  PSYCH:  oriented X 3     SNF labs:    Last Comprehensive Metabolic Panel:  Sodium   Date Value Ref Range Status   11/30/2022 141 133 - 144 mmol/L Final     Potassium   Date Value Ref Range Status   11/30/2022 3.6 3.4 - 5.3 mmol/L Final     Chloride   Date Value Ref Range Status   11/30/2022 105 94 - 109 mmol/L Final     Carbon Dioxide (CO2)   Date Value Ref Range Status   11/30/2022 29 20 - 32 mmol/L Final     Anion Gap   Date Value Ref Range Status   11/30/2022 7 3 - 14 mmol/L Final     Glucose   Date Value Ref Range Status   11/30/2022 106 (H) 70 - 99 mg/dL Final     Urea Nitrogen   Date Value Ref Range Status   11/30/2022 17 7 - 30 mg/dL Final     Creatinine   Date Value Ref Range Status   11/30/2022 0.48 (L) 0.52 - 1.04 mg/dL Final     GFR Estimate   Date Value Ref Range Status   11/30/2022 >90 >60 mL/min/1.73m2 Final     Comment:     Effective December 21, 2021 eGFRcr in adults is calculated using the 2021 CKD-EPI creatinine equation which includes age and gender (Antonio et al., NEJ, DOI: 10.1056/CBQByb9675323)     Calcium   Date Value Ref Range Status   11/30/2022 9.6 8.5 - 10.1 mg/dL Final     Bilirubin Total   Date Value Ref Range Status   08/31/2022 0.5 <=1.2 mg/dL Final     Alkaline Phosphatase   Date Value Ref Range Status   08/31/2022 63 35 - 104 U/L Final     ALT   Date Value Ref Range Status   08/31/2022 7 (L) 10 - 35 U/L Final     AST   Date Value Ref Range Status   11/30/2022 17 0 - 45 U/L Final             Lab Results   Component Value Date    WBC 4.6 11/30/2022     Lab Results   Component Value Date    RBC 3.59 11/30/2022     Lab Results   Component Value Date    HGB 12.1 11/30/2022     Lab Results   Component Value Date    HCT 38.3 11/30/2022     No components found for: MCT  Lab Results   Component Value Date     11/30/2022     Lab Results   Component Value  Date    MCH 33.7 11/30/2022     Lab Results   Component Value Date    MCHC 31.6 11/30/2022     Lab Results   Component Value Date    RDW 13.5 11/30/2022     Lab Results   Component Value Date     11/30/2022         DISCHARGE PLAN:    Follow up labs: No labs orders/due    Medical Follow Up:      Follow up with primary care provider in 1-2 weeks    MT referral needed and placed by this provider: Randi    Current [place] scheduled appointments:       Discharge Services: Home Care:  Occupational Therapy, Physical Therapy, Speech Therapy , Registered Nurse, Home Health Aide and From:  [facility]    Appointments in Next Year    Jan 05, 2023  1:30 PM  (Arrive by 1:25 PM)  Return Cardiology with OSCAR Ivan CNP  [facility] [place] ([facility] ) [phone]              TOTAL DISCHARGE TIME:   Greater than 30 minutes  Electronically signed by:  OSCAR Garduno CNP

## 2022-12-12 NOTE — LETTER
12/12/2022        RE: Pablo Baez  [facility]  [address]  [place] [address]        [place] [facility] DISCHARGE SUMMARY  PATIENT'S NAME: Pablo Baez  YOB: 1945  MEDICAL RECORD NUMBER:  [id number]  Place of Service where encounter took place:  [facility] [[id number]]    PRIMARY CARE PROVIDER AND CLINIC RESPONSIBLE AFTER TRANSFER:   OSCAR aMnuel CNP, [place] [address]  / [facility] [address]    Non-FMG Provider     Transferring providers: OSCAR Garduno CNP; Surinder Barber MD  Recent Hospitalization/ED:  [facility] stay 11/5/22 to 11/12/22.  Date of SNF Admission: November 12, 2022  Date of SNF (anticipated) Discharge: December 14, 2022  Discharged to: previous location, [facility]  Cognitive Scores/Physical Function/DME:  SLUMS 23/30, transfers CGA/SBA, ambulates 160ft CGA, Max A with toileting, Min A with UE dressing, Max A with LE dressing    CODE STATUS/ADVANCE DIRECTIVES DISCUSSION:  Full Code   ALLERGIES: Lisinopril    DISCHARGE DIAGNOSIS/NURSING FACILITY COURSE:   Patient Pablo Baez is a 77 yr old female admitted to [facility] for rehabilitation s/p hospitalization [facility] 11/5-11/12/22 for pseudomonas urinary tract infection, streptococcus oralis bacteremia, endocarditis and falls    PMHX parkinsons disease, UTI, BPPV, cognitive impairment  [facility]      Pseudomonas UTI  Streptococcus Oralis Bacteremia  Endocarditis   repeat blood cultures have been NGTD  LEN performed on 11/10 revealed mitral valve with likely small vegetations  Completed 3 week course IV Ceftriaxone per infectious disease and cardiology    Continue weekly labs: ESR,CRP,CBC with differential, creatinine, SGOT weekly while on this medication to be faxed to Dr. Rojas office  Follow up echocardiogram after antibiotic course and Cardiology  clinic. Had Appointment 12/8 130pm Echo: [facility] Janet Ng   Clarification out to infectious disease on follow up and removal of PICC  Completed course of Lovenox  Had planned to discharge last Friday and discharge pushed to this week per residential request and clarification needed from infectious disease on removal of PICC and follow up. Did get okay for removal of PICC today per infectious disease      Recurrent Falls  Close Head Injury  Hx Parkinson's Disease with Gait Instability   Recurrent falls per hospital report:   -ED on 11/1 with laceration repair to the right scalp with staples and right eyebrow repair with sutures.  CT head with scalp hematomas and c/s was negative and discharge back to residential.    -ED 11/5 am after a mechanical fall with laceration to her left parietal scalp with 2 staples placed and discharge back to her residential.  CT head and C/S with no acute abnormality.  -ED 11/5 later in the day at her [facility] with another fall with generalized weakness and found to have a low grade fever of 100.1.  A Laceration of the left posterior scalp was repaired with staples.  - right eyebrow sutures removed on 11/9   - right scalp staples were removed on 11/11  - left scalp staples removed around 11/15     Parkinson's Disease  Cognitive Impairment  parkinson's disease diagnosed 10/2018.    baseline gait instability, orofacial dyskinesia and hallucinations    SLUMS 21/30  Continue Sinemet, Robinul and as needed Seroquel.   Nuplazid stopped in hospital  Follow up neurologist Dr. Hinojosa as advised    Dysphagia  Reports difficulty swallowing at times  speech therapy followed while at TCU  Regular diet, Advanced (DD3) texture, Nectar consistency      Hx Chronic Neck Pain   h/o cervical spondylosis s/p cervical fusion.  CT c/s with no acute abnormalities  Reports pain managed  Continue Tylenol and Robaxin as needed      Hx Cdiff   hx of cdiff in 2021  No recent diarrhea  Per ID and no prophylactic vancomycin is  "needed    Advanced care planning  Wishes to be FULL code      Past Medical History:  has no past medical history on file.    Discharge Medications:    Current Outpatient Medications   Medication Sig Dispense Refill     acetaminophen (TYLENOL) 500 MG tablet Take 2 tablets (1,000 mg) by mouth 3 times daily At 0800, 1400, 2000       carbidopa-levodopa (SINEMET CR)  MG CR tablet Take 2 tablets by mouth At Bedtime       carbidopa-levodopa (SINEMET)  MG tablet Take 2 tablets by mouth 2 times daily At 12pm and 8pm       carbidopa-levodopa (SINEMET)  MG tablet Take 1.5 tablets by mouth 2 times daily 0800 and 1600       cholecalciferol 50 MCG (2000 UT) tablet Take 1 tablet (50 mcg) by mouth daily       famotidine (PEPCID) 20 MG tablet Take 1 tablet (20 mg) by mouth daily At 0800       glycopyrrolate (ROBINUL) 1 MG tablet Take 1 tablet (1 mg) by mouth every morning       methocarbamol (ROBAXIN) 500 MG tablet Take 0.5 tablets (250 mg) by mouth 3 times daily as needed for muscle spasms       Multiple Vitamins-Minerals (HM MULTIVITAMIN ADULT GUMMY) CHEW Take 1 chew tab by mouth daily       polyvinyl alcohol (LIQUIFILM TEARS) 1.4 % ophthalmic solution Place 1 drop into both eyes every 4 hours as needed for dry eyes       senna-docusate (SENOKOT-S/PERICOLACE) 8.6-50 MG tablet Take 1 tablet by mouth daily as needed         Medication Changes/Rationale:         Controlled medications sent with patient:   not applicable/none     ROS:   4 point ROS including Respiratory, CV, GI and , other than that noted in the HPI,  is negative    Physical Exam:   Vitals: BP (!) 166/74   Pulse 58   Temp 97  F (36.1  C)   Resp 18   Ht 1.549 m (5' 1\")   Wt 52.7 kg (116 lb 3.2 oz)   SpO2 98%   BMI 21.96 kg/m    BMI= Body mass index is 21.96 kg/m .  GENERAL APPEARANCE:  Alert, in no distress  RESP:  respiratory effort and palpation of chest normal, lungs clear to auscultation , no respiratory distress  CV:  Palpation and " auscultation of heart done , regular rate and rhythm, no murmur, rub, or gallop  ABDOMEN:  normal bowel sounds, soft, nontender, no hepatosplenomegaly or other masses  M/S:   lying in bed  SKIN:  Inspection of skin and subcutaneous tissue baseline  PSYCH:  oriented X 3     SNF labs:    Last Comprehensive Metabolic Panel:  Sodium   Date Value Ref Range Status   11/30/2022 141 133 - 144 mmol/L Final     Potassium   Date Value Ref Range Status   11/30/2022 3.6 3.4 - 5.3 mmol/L Final     Chloride   Date Value Ref Range Status   11/30/2022 105 94 - 109 mmol/L Final     Carbon Dioxide (CO2)   Date Value Ref Range Status   11/30/2022 29 20 - 32 mmol/L Final     Anion Gap   Date Value Ref Range Status   11/30/2022 7 3 - 14 mmol/L Final     Glucose   Date Value Ref Range Status   11/30/2022 106 (H) 70 - 99 mg/dL Final     Urea Nitrogen   Date Value Ref Range Status   11/30/2022 17 7 - 30 mg/dL Final     Creatinine   Date Value Ref Range Status   11/30/2022 0.48 (L) 0.52 - 1.04 mg/dL Final     GFR Estimate   Date Value Ref Range Status   11/30/2022 >90 >60 mL/min/1.73m2 Final     Comment:     Effective December 21, 2021 eGFRcr in adults is calculated using the 2021 CKD-EPI creatinine equation which includes age and gender (Antonio et al., NEJ, DOI: 10.1056/XLOPaw3126253)     Calcium   Date Value Ref Range Status   11/30/2022 9.6 8.5 - 10.1 mg/dL Final     Bilirubin Total   Date Value Ref Range Status   08/31/2022 0.5 <=1.2 mg/dL Final     Alkaline Phosphatase   Date Value Ref Range Status   08/31/2022 63 35 - 104 U/L Final     ALT   Date Value Ref Range Status   08/31/2022 7 (L) 10 - 35 U/L Final     AST   Date Value Ref Range Status   11/30/2022 17 0 - 45 U/L Final             Lab Results   Component Value Date    WBC 4.6 11/30/2022     Lab Results   Component Value Date    RBC 3.59 11/30/2022     Lab Results   Component Value Date    HGB 12.1 11/30/2022     Lab Results   Component Value Date    HCT 38.3 11/30/2022     No  components found for: MCT  Lab Results   Component Value Date     11/30/2022     Lab Results   Component Value Date    MCH 33.7 11/30/2022     Lab Results   Component Value Date    MCHC 31.6 11/30/2022     Lab Results   Component Value Date    RDW 13.5 11/30/2022     Lab Results   Component Value Date     11/30/2022         DISCHARGE PLAN:    Follow up labs: No labs orders/due    Medical Follow Up:      Follow up with primary care provider in 1-2 weeks    MT referral needed and placed by this provider: Randi    Current [place] scheduled appointments:       Discharge Services: Home Care:  Occupational Therapy, Physical Therapy, Speech Therapy , Registered Nurse, Home Health Aide and From:  [facility]    Appointments in Next Year    Jan 05, 2023  1:30 PM  (Arrive by 1:25 PM)  Return Cardiology with OSCAR Ivan CNP  [facility] [place] ([facility] ) [phone]              TOTAL DISCHARGE TIME:   Greater than 30 minutes  Electronically signed by:  OSCAR Garduno CNP                         Sincerely,        OSCAR Garduno CNP

## 2022-12-23 LAB — BACTERIA BLD CULT: NO GROWTH

## 2023-01-05 ENCOUNTER — VIRTUAL VISIT (OUTPATIENT)
Dept: CARDIOLOGY | Facility: CLINIC | Age: 78
End: 2023-01-05
Attending: NURSE PRACTITIONER
Payer: MEDICARE

## 2023-01-05 DIAGNOSIS — I33.0 BACTERIAL ENDOCARDITIS, UNSPECIFIED CHRONICITY: Primary | ICD-10-CM

## 2023-01-05 DIAGNOSIS — G20.A1 PARKINSON'S DISEASE (H): ICD-10-CM

## 2023-01-05 PROCEDURE — 99443 PR PHYSICIAN TELEPHONE EVALUATION 21-30 MIN: CPT | Mod: 95 | Performed by: NURSE PRACTITIONER

## 2023-01-05 NOTE — LETTER
"1/5/2023    OSCAR Manuel [facility]  [employer] [facility] [address] [facility] [address]    RE: Pablo FRENCH Nestor       Dear Colleague,     I had the pleasure of seeing Pablo Baez in the [facility].  CARDIOLOGY TELEPHONE VISIT    Pablo Baez is a 77 year old female who is being evaluated via a billable telephone visit.      The patient has been notified of following:     \"This telephone visit will be conducted via a call between you and your physician/provider. Given concern for spread of COVID 19 we are minimizing in person clinic visits when possible. We have found that certain health care needs can be provided without the need for a physical exam.  This service lets us provide the care you need with a short phone conversation.  If a prescription is necessary we can send it directly to your pharmacy.  If lab work is needed we can place an order for that and you can then stop by our lab to have the test done at a later time. If during the course of the call the physician/provider feels a telephone visit is not appropriate, you will not be charged for this service.\"     Patient has given verbal consent to telephone visit. YES      I have reviewed and updated the patient's Past Medical History, Social History, Family History and Medication List.    MEDICATIONS:  Current Outpatient Medications   Medication Sig Dispense Refill     acetaminophen (TYLENOL) 500 MG tablet Take 2 tablets (1,000 mg) by mouth 3 times daily At 0800, 1400, 2000       carbidopa-levodopa (SINEMET CR)  MG CR tablet Take 2 tablets by mouth At Bedtime       carbidopa-levodopa (SINEMET)  MG tablet Take 2 tablets by mouth 2 times daily At 12pm and 8pm       carbidopa-levodopa (SINEMET)  MG tablet Take 1.5 tablets by mouth 2 times daily 0800 and 1600       cholecalciferol 50 MCG (2000 UT) tablet Take 1 tablet (50 mcg) by mouth daily       famotidine (PEPCID) 20 MG tablet Take 1 tablet (20 mg) by " mouth daily At 0800       glycopyrrolate (ROBINUL) 1 MG tablet Take 1 tablet (1 mg) by mouth every morning       methocarbamol (ROBAXIN) 500 MG tablet Take 0.5 tablets (250 mg) by mouth 3 times daily as needed for muscle spasms       Multiple Vitamins-Minerals (HM MULTIVITAMIN ADULT GUMMY) CHEW Take 1 chew tab by mouth daily       polyvinyl alcohol (LIQUIFILM TEARS) 1.4 % ophthalmic solution Place 1 drop into both eyes every 4 hours as needed for dry eyes       senna-docusate (SENOKOT-S/PERICOLACE) 8.6-50 MG tablet Take 1 tablet by mouth daily as needed         ALLERGIES  Lisinopril    Review Of Systems  Skin: negative  Eyes: negative  Ears/Nose/Throat: negative  Respiratory: No shortness of breath, dyspnea on exertion, cough, or hemoptysis  Cardiovascular: negative  Gastrointestinal: negative  Genitourinary: negative  Musculoskeletal: positive for Parkinson's. Rib pain d/t fall   Neurologic: positive for local weakness, involuntary movements and memory problems      HPI:        History of Presenting Illness:      Pablo Baez is a pleasant 77 year old patient who carries a past medical history significant for Parkinson's, BPPV, and cognitive impairment.  On 11/5/2022, she presented to the emergency room after a fall at her living facility.  In the days prior to her admission she had had progressive weakness and low-grade fever.  She was positive for UTI.  She underwent an echocardiogram that showed a normal ejection fraction estimated at 60 to 65%, no wall motion abnormalities, no obvious vegetation noted but valves were thickened.  She subsequently underwent a transesophageal echocardiogram that showed a tiny mobile area of the distal portion of the anterior leaflet on the atrial side, could represent early vegetation.  On the 2 chamber view there was at least 2 tiny mobile areas on the atrial side of the mitral valve which may also present small vegetation.  Blood cultures were positive for strep oralis.   She was evaluated by ID and started on IV ceftriaxone for minimum of 3 weeks.  She was discharged to her assisted living facility at [facility].  Follow-up echocardiogram on 12/8/2022 showed normal LV size and function, normal RV size and function, normal valves and no evidence of endocarditis.    Today she is participating in a follow-up telephone visit for review of results. She is feeling well on a cardiac standpoint, denies chest pain, shortness of breath, PND, orthopnea, presyncope, syncope, edema, heart racing, or palpitations.  Her primary complaint is rib pain after a recent fall out of bed.     Blood pressure are monitored weekly at her living facility with an average of 120-140/60 's.  She is on no antihypertensive agents.  Losartan was stopped at the time of discharge  Last BMP showed a sodium of 141, potassium 3.6, BUN 17, creatinine 0.48, and GFR greater than 90  Hemoglobin 12.1, hematocrit 38.3, WBC 4.6, and platelet 344                     Assessment and Plan:     1.  Bacterial endocarditis -LEN showed a tiny mobile area of the distal portion of the anterior mitral leaflet on the atrial side, representing early vegetation and two tiny mobile areas on the atrial side of the mitral valve representing small vegetation.  She underwent 4 weeks of IV antibiotics.  Follow-up limited echocardiogram on 12/8/2022 showed normal LV function, normal RV size and function, normal valves, and no evidence of endocarditis.    2.  Parkinson's -managed on carbidopa levodopa.  Follows with neurology             Follow up plan: Follow up with  heart as needed     I have reviewed the note as documented above.  This accurately captures the substance of my conversation with the patient.      Phone call contact time - 15 min  Additional 15 minutes was spent reviewing EMR and documentation    Antonia Larios, OSCAR, CNP  1/5/2023         Thank you for allowing me to participate in the care of your  patient.      Sincerely,     OSCAR Ivan CNP     [facility]  cc:   OSCAR Ivan CNP  [address] [place] [address]

## 2023-01-05 NOTE — PROGRESS NOTES
"CARDIOLOGY TELEPHONE VISIT    Pablo Baez is a 77 year old female who is being evaluated via a billable telephone visit.      The patient has been notified of following:     \"This telephone visit will be conducted via a call between you and your physician/provider. Given concern for spread of COVID 19 we are minimizing in person clinic visits when possible. We have found that certain health care needs can be provided without the need for a physical exam.  This service lets us provide the care you need with a short phone conversation.  If a prescription is necessary we can send it directly to your pharmacy.  If lab work is needed we can place an order for that and you can then stop by our lab to have the test done at a later time. If during the course of the call the physician/provider feels a telephone visit is not appropriate, you will not be charged for this service.\"     Patient has given verbal consent to telephone visit. YES      I have reviewed and updated the patient's Past Medical History, Social History, Family History and Medication List.    MEDICATIONS:  Current Outpatient Medications   Medication Sig Dispense Refill     acetaminophen (TYLENOL) 500 MG tablet Take 2 tablets (1,000 mg) by mouth 3 times daily At 0800, 1400, 2000       carbidopa-levodopa (SINEMET CR)  MG CR tablet Take 2 tablets by mouth At Bedtime       carbidopa-levodopa (SINEMET)  MG tablet Take 2 tablets by mouth 2 times daily At 12pm and 8pm       carbidopa-levodopa (SINEMET)  MG tablet Take 1.5 tablets by mouth 2 times daily 0800 and 1600       cholecalciferol 50 MCG (2000 UT) tablet Take 1 tablet (50 mcg) by mouth daily       famotidine (PEPCID) 20 MG tablet Take 1 tablet (20 mg) by mouth daily At 0800       glycopyrrolate (ROBINUL) 1 MG tablet Take 1 tablet (1 mg) by mouth every morning       methocarbamol (ROBAXIN) 500 MG tablet Take 0.5 tablets (250 mg) by mouth 3 times daily as needed for muscle spasms       " Multiple Vitamins-Minerals (HM MULTIVITAMIN ADULT GUMMY) CHEW Take 1 chew tab by mouth daily       polyvinyl alcohol (LIQUIFILM TEARS) 1.4 % ophthalmic solution Place 1 drop into both eyes every 4 hours as needed for dry eyes       senna-docusate (SENOKOT-S/PERICOLACE) 8.6-50 MG tablet Take 1 tablet by mouth daily as needed         ALLERGIES  Lisinopril    Review Of Systems  Skin: negative  Eyes: negative  Ears/Nose/Throat: negative  Respiratory: No shortness of breath, dyspnea on exertion, cough, or hemoptysis  Cardiovascular: negative  Gastrointestinal: negative  Genitourinary: negative  Musculoskeletal: positive for Parkinson's. Rib pain d/t fall   Neurologic: positive for local weakness, involuntary movements and memory problems      HPI:        History of Presenting Illness:      Pablo Baez is a pleasant 77 year old patient who carries a past medical history significant for Parkinson's, BPPV, and cognitive impairment.  On 11/5/2022, she presented to the emergency room after a fall at her living facility.  In the days prior to her admission she had had progressive weakness and low-grade fever.  She was positive for UTI.  She underwent an echocardiogram that showed a normal ejection fraction estimated at 60 to 65%, no wall motion abnormalities, no obvious vegetation noted but valves were thickened.  She subsequently underwent a transesophageal echocardiogram that showed a tiny mobile area of the distal portion of the anterior leaflet on the atrial side, could represent early vegetation.  On the 2 chamber view there was at least 2 tiny mobile areas on the atrial side of the mitral valve which may also present small vegetation.  Blood cultures were positive for strep oralis.  She was evaluated by ID and started on IV ceftriaxone for minimum of 3 weeks.  She was discharged to her assisted living facility at [facility].  Follow-up echocardiogram on 12/8/2022 showed normal LV size and function, normal  RV size and function, normal valves and no evidence of endocarditis.    Today she is participating in a follow-up telephone visit for review of results. She is feeling well on a cardiac standpoint, denies chest pain, shortness of breath, PND, orthopnea, presyncope, syncope, edema, heart racing, or palpitations.  Her primary complaint is rib pain after a recent fall out of bed.     Blood pressure are monitored weekly at her living facility with an average of 120-140/60 's.  She is on no antihypertensive agents.  Losartan was stopped at the time of discharge  Last BMP showed a sodium of 141, potassium 3.6, BUN 17, creatinine 0.48, and GFR greater than 90  Hemoglobin 12.1, hematocrit 38.3, WBC 4.6, and platelet 344                     Assessment and Plan:     1.  Bacterial endocarditis -LEN showed a tiny mobile area of the distal portion of the anterior mitral leaflet on the atrial side, representing early vegetation and two tiny mobile areas on the atrial side of the mitral valve representing small vegetation.  She underwent 4 weeks of IV antibiotics.  Follow-up limited echocardiogram on 12/8/2022 showed normal LV function, normal RV size and function, normal valves, and no evidence of endocarditis.    2.  Parkinson's -managed on carbidopa levodopa.  Follows with neurology             Follow up plan: Follow up with  heart as needed     I have reviewed the note as documented above.  This accurately captures the substance of my conversation with the patient.      Phone call contact time - 15 min  Additional 15 minutes was spent reviewing EMR and documentation    OSCAR Ivan, CNP  1/5/2023

## 2023-01-05 NOTE — PATIENT INSTRUCTIONS
Thanks for participating in a telephone visit with the [facility] clinic today.    Reviewed results of limited echocardiogram showing no evidence of endocarditis.   Follow up with PCP regularly and [facility] as needed       Please call my nurse at  [phone] with any questions or concerns.    Scheduling phone number: [phone]  Reminder: Please bring in all current medications, over the counter supplements and vitamin bottles to your next appointment.

## 2023-01-25 ENCOUNTER — APPOINTMENT (OUTPATIENT)
Dept: CT IMAGING | Facility: CLINIC | Age: 78
End: 2023-01-25
Attending: EMERGENCY MEDICINE
Payer: MEDICARE

## 2023-01-25 ENCOUNTER — HOSPITAL ENCOUNTER (EMERGENCY)
Facility: CLINIC | Age: 78
Discharge: HOME | End: 2023-01-25
Attending: EMERGENCY MEDICINE | Admitting: EMERGENCY MEDICINE
Payer: MEDICARE

## 2023-01-25 VITALS
OXYGEN SATURATION: 83 % | WEIGHT: 120 LBS | HEIGHT: 61 IN | DIASTOLIC BLOOD PRESSURE: 80 MMHG | BODY MASS INDEX: 22.66 KG/M2 | HEART RATE: 74 BPM | SYSTOLIC BLOOD PRESSURE: 174 MMHG | TEMPERATURE: 97.8 F | RESPIRATION RATE: 18 BRPM

## 2023-01-25 DIAGNOSIS — S09.90XA CLOSED HEAD INJURY, INITIAL ENCOUNTER: ICD-10-CM

## 2023-01-25 DIAGNOSIS — S01.81XA FACIAL LACERATION, INITIAL ENCOUNTER: ICD-10-CM

## 2023-01-25 DIAGNOSIS — S00.83XA CONTUSION OF FACE, INITIAL ENCOUNTER: ICD-10-CM

## 2023-01-25 PROCEDURE — G1010 CDSM STANSON: HCPCS

## 2023-01-25 PROCEDURE — 99284 EMERGENCY DEPT VISIT MOD MDM: CPT | Mod: 25

## 2023-01-25 PROCEDURE — 250N000013 HC RX MED GY IP 250 OP 250 PS 637: Performed by: EMERGENCY MEDICINE

## 2023-01-25 PROCEDURE — 12011 RPR F/E/E/N/L/M 2.5 CM/<: CPT

## 2023-01-25 PROCEDURE — 70450 CT HEAD/BRAIN W/O DYE: CPT | Mod: ME

## 2023-01-25 RX ORDER — CARBIDOPA AND LEVODOPA 25; 100 MG/1; MG/1
2 TABLET ORAL ONCE
Status: COMPLETED | OUTPATIENT
Start: 2023-01-25 | End: 2023-01-25

## 2023-01-25 RX ORDER — LIDOCAINE HYDROCHLORIDE AND EPINEPHRINE 10; 10 MG/ML; UG/ML
INJECTION, SOLUTION INFILTRATION; PERINEURAL
Status: DISCONTINUED
Start: 2023-01-25 | End: 2023-01-25 | Stop reason: HOSPADM

## 2023-01-25 RX ADMIN — CARBIDOPA AND LEVODOPA 2 TABLET: 25; 100 TABLET ORAL at 15:05

## 2023-01-25 ASSESSMENT — ENCOUNTER SYMPTOMS
SHORTNESS OF BREATH: 0
WEAKNESS: 0
HEADACHES: 1
DIZZINESS: 0
NECK PAIN: 0
BACK PAIN: 0
WOUND: 1
FEVER: 0

## 2023-01-25 ASSESSMENT — ACTIVITIES OF DAILY LIVING (ADL)
ADLS_ACUITY_SCORE: 35
ADLS_ACUITY_SCORE: 35

## 2023-01-25 NOTE — ED TRIAGE NOTES
Arrives via EMS from [facility] living in [place].  Hx of Parkinsons.  Was sitting in chair and lost her balance falling onto the floor hitting face.  Is not on thinners, SBP=160's en route. No neck pain or other pain reported by EMS.  Ambulating on scene. Placed in c-collar per EMS protocol.  Patient reports mild pain to left cheekbone. ABCD's intact; A/O x 4.

## 2023-02-26 ENCOUNTER — MEDICAL CORRESPONDENCE (OUTPATIENT)
Dept: HEALTH INFORMATION MANAGEMENT | Facility: CLINIC | Age: 78
End: 2023-02-26

## 2023-05-02 ENCOUNTER — LAB REQUISITION (OUTPATIENT)
Dept: LAB | Facility: CLINIC | Age: 78
End: 2023-05-02
Payer: MEDICARE

## 2023-05-02 DIAGNOSIS — F02.80 DEMENTIA IN OTHER DISEASES CLASSIFIED ELSEWHERE, UNSPECIFIED SEVERITY, WITHOUT BEHAVIORAL DISTURBANCE, PSYCHOTIC DISTURBANCE, MOOD DISTURBANCE, AND ANXIETY (H): ICD-10-CM

## 2023-05-02 DIAGNOSIS — D64.9 ANEMIA, UNSPECIFIED: ICD-10-CM

## 2023-05-03 LAB
ALBUMIN SERPL BCG-MCNC: 4.5 G/DL (ref 3.5–5.2)
ALP SERPL-CCNC: 121 U/L (ref 35–104)
ALT SERPL W P-5'-P-CCNC: <5 U/L (ref 10–35)
ANION GAP SERPL CALCULATED.3IONS-SCNC: 10 MMOL/L (ref 7–15)
AST SERPL W P-5'-P-CCNC: 23 U/L (ref 10–35)
BILIRUB SERPL-MCNC: 0.6 MG/DL
BUN SERPL-MCNC: 13.9 MG/DL (ref 8–23)
CALCIUM SERPL-MCNC: 9.8 MG/DL (ref 8.8–10.2)
CHLORIDE SERPL-SCNC: 102 MMOL/L (ref 98–107)
CREAT SERPL-MCNC: 0.53 MG/DL (ref 0.51–0.95)
DEPRECATED HCO3 PLAS-SCNC: 29 MMOL/L (ref 22–29)
ERYTHROCYTE [DISTWIDTH] IN BLOOD BY AUTOMATED COUNT: 13.4 % (ref 10–15)
GFR SERPL CREATININE-BSD FRML MDRD: >90 ML/MIN/1.73M2
GLUCOSE SERPL-MCNC: 91 MG/DL (ref 70–99)
HCT VFR BLD AUTO: 38.7 % (ref 35–47)
HGB BLD-MCNC: 12.1 G/DL (ref 11.7–15.7)
MCH RBC QN AUTO: 34.2 PG (ref 26.5–33)
MCHC RBC AUTO-ENTMCNC: 31.3 G/DL (ref 31.5–36.5)
MCV RBC AUTO: 109 FL (ref 78–100)
PLATELET # BLD AUTO: 390 10E3/UL (ref 150–450)
POTASSIUM SERPL-SCNC: 4.1 MMOL/L (ref 3.4–5.3)
PROT SERPL-MCNC: 6.8 G/DL (ref 6.4–8.3)
RBC # BLD AUTO: 3.54 10E6/UL (ref 3.8–5.2)
SODIUM SERPL-SCNC: 141 MMOL/L (ref 136–145)
TSH SERPL DL<=0.005 MIU/L-ACNC: 0.54 UIU/ML (ref 0.3–4.2)
WBC # BLD AUTO: 4.7 10E3/UL (ref 4–11)

## 2023-05-03 PROCEDURE — 84443 ASSAY THYROID STIM HORMONE: CPT | Mod: ORL | Performed by: NURSE PRACTITIONER

## 2023-05-03 PROCEDURE — 80053 COMPREHEN METABOLIC PANEL: CPT | Mod: ORL | Performed by: NURSE PRACTITIONER

## 2023-05-03 PROCEDURE — 85027 COMPLETE CBC AUTOMATED: CPT | Mod: ORL | Performed by: NURSE PRACTITIONER

## 2023-05-03 PROCEDURE — 36415 COLL VENOUS BLD VENIPUNCTURE: CPT | Mod: ORL | Performed by: NURSE PRACTITIONER

## 2023-06-11 ENCOUNTER — NURSE TRIAGE (OUTPATIENT)
Dept: NURSING | Facility: CLINIC | Age: 78
End: 2023-06-11
Payer: COMMERCIAL

## 2023-06-11 ENCOUNTER — TELEPHONE (OUTPATIENT)
Dept: NURSING | Facility: CLINIC | Age: 78
End: 2023-06-11
Payer: COMMERCIAL

## 2023-06-11 NOTE — TELEPHONE ENCOUNTER
"Patient called back in and stated \"I can't remember why I called you\".  Discussed remembering our conversation before and she stated 'yes, are you getting someone to help?'       Assisted Living  arrived in the room and patient handed her the phone.  She was able to verbalize that patient does have confusion, but today was the first day she has seen her talking to someone in the room.  Advised to discuss with the nursing staff at facility with this change.        Police also arrived during call and no further action needed.      Lupe Carrera RN on 6/11/2023 at 11:01 AM     "

## 2023-06-11 NOTE — TELEPHONE ENCOUNTER
"Triage Call:    Caller: Patient   (Able to identify 3 identifiers)     Calling asking about getting a feeding tube replaced for God today.  She also reports that enriqueta is there, \"he is an reza\".      \"it isn't a tube, it is a pathway.  I was feeding him for the first way with it last night and there is a pathway and I put something in it like he told me last night. I have never seen anything like this\".    \"I think he needs to be seen or talked to by someone from there\".    Patient has a history of being an RN.    \"This is not a crackpot call either, he is all real and true\".       Offered to call the staff at facility to come and assit her.   \"Felipa doesn't know of his as they can't see him and they don't know him\".    When offered to have someone come help, she talks to someone in the background \"should we have them come and take you to the ER\".  And she states \"they said no\".  When she asked them again, she reports \"they said okay\".      Advised would call facility staff or someone to come and assist.  She verbalized understanding.    Attempted to contact staff at Assisted Living facility, but no answer.    Consent to communicate on file, so called to nadya Weaver, no answer on either phone number attempted.  Both are unidentified, so unable to leave a voicemail.      As unable to get patient baseline level of confusion or delirium, triaged with confusion protocol    Protocol Recommended Disposition: Call to 911.   Will call to request a wellness check, as unable to determine baseline.  They are sending someone out to check on patient.    Called back to patient and let her know that someone was on their way to assist her and God.          Tried to call to daughter on more time and was able to get ahold of her.  She stated that this is the patietn's baseline.  Informed that did have a wellness visit sent, due to being unable to discuss patient's baseline and having no chart documentation about this.   She " verbalized understanding.          Lupe Carrera RN on 6/11/2023 at 10:23 AM    Reason for Disposition    [1] Difficult to awaken or acting confused (e.g., disoriented, slurred speech) AND [2] present now AND [3] new-onset    Additional Information    Negative: [1] Difficult to awaken or acting confused (e.g., disoriented, slurred speech) AND [2] present now AND [3] diabetes mellitus    Protocols used: CONFUSION - DELIRIUM-A-AH

## 2023-06-12 ENCOUNTER — TELEPHONE (OUTPATIENT)
Dept: GASTROENTEROLOGY | Facility: CLINIC | Age: 78
End: 2023-06-12
Payer: COMMERCIAL

## 2023-06-12 PROCEDURE — 87086 URINE CULTURE/COLONY COUNT: CPT | Mod: ORL | Performed by: NURSE PRACTITIONER

## 2023-06-12 PROCEDURE — 81001 URINALYSIS AUTO W/SCOPE: CPT | Mod: ORL | Performed by: NURSE PRACTITIONER

## 2023-06-12 NOTE — TELEPHONE ENCOUNTER
Caller: Pablo Baez    Pt called explaining her friend God needed his feeding tube replaced. When I asked for additional information on her friend she said he has no date of birth and no last name. I explained to her we were unable to find God's chart an as a result that would make it hard for us to schedule anything for him. She was addiment about getting the feeding tube replaced scheduled. I let her know we will work on finding his chart and would give her a call back when we did.

## 2023-06-13 ENCOUNTER — LAB REQUISITION (OUTPATIENT)
Dept: LAB | Facility: CLINIC | Age: 78
End: 2023-06-13
Payer: MEDICARE

## 2023-06-13 DIAGNOSIS — D64.9 ANEMIA, UNSPECIFIED: ICD-10-CM

## 2023-06-13 DIAGNOSIS — I10 ESSENTIAL (PRIMARY) HYPERTENSION: ICD-10-CM

## 2023-06-21 LAB
ANION GAP SERPL CALCULATED.3IONS-SCNC: 11 MMOL/L (ref 7–15)
BUN SERPL-MCNC: 15.9 MG/DL (ref 8–23)
CALCIUM SERPL-MCNC: 9.5 MG/DL (ref 8.8–10.2)
CHLORIDE SERPL-SCNC: 105 MMOL/L (ref 98–107)
CREAT SERPL-MCNC: 0.62 MG/DL (ref 0.51–0.95)
DEPRECATED HCO3 PLAS-SCNC: 28 MMOL/L (ref 22–29)
ERYTHROCYTE [DISTWIDTH] IN BLOOD BY AUTOMATED COUNT: 13 % (ref 10–15)
GFR SERPL CREATININE-BSD FRML MDRD: >90 ML/MIN/1.73M2
GLUCOSE SERPL-MCNC: 86 MG/DL (ref 70–99)
HCT VFR BLD AUTO: 39.1 % (ref 35–47)
HGB BLD-MCNC: 12.7 G/DL (ref 11.7–15.7)
MCH RBC QN AUTO: 34.5 PG (ref 26.5–33)
MCHC RBC AUTO-ENTMCNC: 32.5 G/DL (ref 31.5–36.5)
MCV RBC AUTO: 106 FL (ref 78–100)
PLATELET # BLD AUTO: 291 10E3/UL (ref 150–450)
POTASSIUM SERPL-SCNC: 3.9 MMOL/L (ref 3.4–5.3)
RBC # BLD AUTO: 3.68 10E6/UL (ref 3.8–5.2)
SODIUM SERPL-SCNC: 144 MMOL/L (ref 136–145)
WBC # BLD AUTO: 5 10E3/UL (ref 4–11)

## 2023-06-21 PROCEDURE — P9604 ONE-WAY ALLOW PRORATED TRIP: HCPCS | Mod: ORL | Performed by: NURSE PRACTITIONER

## 2023-06-21 PROCEDURE — 80048 BASIC METABOLIC PNL TOTAL CA: CPT | Mod: ORL | Performed by: NURSE PRACTITIONER

## 2023-06-21 PROCEDURE — 36415 COLL VENOUS BLD VENIPUNCTURE: CPT | Mod: ORL | Performed by: NURSE PRACTITIONER

## 2023-06-21 PROCEDURE — 85027 COMPLETE CBC AUTOMATED: CPT | Mod: ORL | Performed by: NURSE PRACTITIONER

## 2023-12-05 ENCOUNTER — APPOINTMENT (OUTPATIENT)
Dept: CT IMAGING | Facility: CLINIC | Age: 78
End: 2023-12-05
Attending: EMERGENCY MEDICINE
Payer: MEDICARE

## 2023-12-05 ENCOUNTER — HOSPITAL ENCOUNTER (EMERGENCY)
Facility: CLINIC | Age: 78
Discharge: HOME | End: 2023-12-06
Attending: EMERGENCY MEDICINE | Admitting: EMERGENCY MEDICINE
Payer: MEDICARE

## 2023-12-05 ENCOUNTER — APPOINTMENT (OUTPATIENT)
Dept: GENERAL RADIOLOGY | Facility: CLINIC | Age: 78
End: 2023-12-05
Attending: EMERGENCY MEDICINE
Payer: MEDICARE

## 2023-12-05 DIAGNOSIS — S00.81XA FACIAL ABRASION, INITIAL ENCOUNTER: ICD-10-CM
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
## 2023-12-05 PROCEDURE — 93005 ELECTROCARDIOGRAM TRACING: CPT

## 2023-12-05 PROCEDURE — 71046 X-RAY EXAM CHEST 2 VIEWS: CPT

## 2023-12-05 PROCEDURE — 99284 EMERGENCY DEPT VISIT MOD MDM: CPT | Mod: 25

## 2023-12-05 PROCEDURE — G1010 CDSM STANSON: HCPCS

## 2023-12-05 ASSESSMENT — ACTIVITIES OF DAILY LIVING (ADL)
ADLS_ACUITY_SCORE: 35
ADLS_ACUITY_SCORE: 35

## 2023-12-06 VITALS
HEART RATE: 65 BPM | DIASTOLIC BLOOD PRESSURE: 77 MMHG | RESPIRATION RATE: 18 BRPM | TEMPERATURE: 97.7 F | SYSTOLIC BLOOD PRESSURE: 155 MMHG | OXYGEN SATURATION: 93 %

## 2023-12-06 LAB
ATRIAL RATE - MUSE: 60 BPM
DIASTOLIC BLOOD PRESSURE - MUSE: NORMAL MMHG
INTERPRETATION ECG - MUSE: NORMAL
P AXIS - MUSE: 82 DEGREES
PR INTERVAL - MUSE: 152 MS
QRS DURATION - MUSE: 82 MS
QT - MUSE: 434 MS
QTC - MUSE: 434 MS
R AXIS - MUSE: 33 DEGREES
SYSTOLIC BLOOD PRESSURE - MUSE: NORMAL MMHG
T AXIS - MUSE: 63 DEGREES
VENTRICULAR RATE- MUSE: 60 BPM

## 2023-12-06 NOTE — ED PROVIDER NOTES
History     Chief Complaint:  Fall       The history is provided by the patient.      Pablo Baez is a 78 year old female with a history of Parkinson's disease who presents to the ED via EMS for evaluation after a fall. Patient reports that she was sitting in her wheelchair and was set to go to bed, when she fell out of it and hit her head. She states that she didn't faint or lose consciousness. She denies anything feeling out of place or broken. Outside of this, patient has a cough and states that she is worried about COPD.    Independent Historian:   None - Patient Only    Review of External Notes:   Reviewed the patient's chart and she unfortunately has been seen in our emergency department on several occasions for frequent falls.    Medications:    acetaminophen (TYLENOL) 500 MG tablet  carbidopa-levodopa (SINEMET CR)  MG CR tablet  carbidopa-levodopa (SINEMET)  MG tablet  carbidopa-levodopa (SINEMET)  MG tablet  cholecalciferol 50 MCG (2000 UT) tablet  famotidine (PEPCID) 20 MG tablet  glycopyrrolate (ROBINUL) 1 MG tablet  methocarbamol (ROBAXIN) 500 MG tablet  Multiple Vitamins-Minerals (HM MULTIVITAMIN ADULT GUMMY) CHEW  polyvinyl alcohol (LIQUIFILM TEARS) 1.4 % ophthalmic solution  senna-docusate (SENOKOT-S/PERICOLACE) 8.6-50 MG tablet      Past Medical History:    Patient Active Problem List   Diagnosis    Confusion    Multiple falls    Laceration of scalp, initial encounter    Closed fracture of multiple ribs of left side, initial encounter    Left hand weakness    Closed fracture of second thoracic vertebra, unspecified fracture morphology, initial encounter (H)    Generalized weakness    Falls frequently    Parkinson's disease       Physical Exam   Patient Vitals for the past 24 hrs:   BP Temp Temp src Pulse Resp SpO2   12/06/23 0000 (!) 155/77 -- -- 65 -- 93 %   12/05/23 2343 -- -- -- -- -- 95 %   12/05/23 2328 (!) 146/69 -- -- -- -- 95 %   12/05/23 2258 (!) 141/65 -- -- -- --  "94 %   12/05/23 2243 -- -- -- -- -- 95 %   12/05/23 2228 (!) 148/65 -- -- -- -- 94 %   12/05/23 2038 (!) 171/71 97.7  F (36.5  C) Oral 61 18 97 %     Physical Exam  General: Patient is alert, awake and interactive when I enter the room  Head: abrasion to the forehead with dried blood   Eyes: The pupils are equal, round, and reactive to light. Conjunctivae and sclerae are normal  ENT: No hemotympanum or signs basilar skull fracture. The oropharynx is normal without erythema. No tenderness to palpation of the face, nose or jaw.   Neck: Normal range of motion. No cervical midline tenderness.   CV: Regular rate. S1/S2. No murmurs.   Resp: Lungs are clear without wheezes or rales. No distress. No crepitance.   GI: Abdomen is soft, no rigidity, guarding, or rebound. No contusion. No distension. No tenderness to palpation in any quadrant.     MS: Normal tone. Joints grossly normal without effusions. No asymmetric leg swelling, calf or thigh tenderness. Normal motor assessment of all extremities. PROM performed of all major joints without pain . No C/T/L tenderness in midline  Skin: No rash or lesions noted. Normal capillary refill noted  Neuro:  GCS 15.  CN\"s II-XII intact. Speech is normal and fluent. Face is symmetric. Cog wheeling   Psych: Normal affect.  Appropriate interactions.     Emergency Department Course   ECG  ECG taken at 2251, ECG read at 2253  Normal sinus rhythm  Septal infarct, age undetermined  Abnormal ECG   No significant change as compared to prior, dated 11/5/2022.  Rate 60 bpm. IA interval 152 ms. QRS duration 82 ms. QT/QTc 434/434 ms. P-R-T axes 82 33 63.     Imaging:  XR Chest 2 Views   Final Result   IMPRESSION: No acute abnormality.      CT Cervical Spine w/o Contrast   Final Result   IMPRESSION:   HEAD CT:   1.  No CT finding of a mass, hemorrhage or focal area suggestive of acute infarct.   2.  Diffuse age related changes.      CERVICAL SPINE CT:   1.  No CT evidence for acute fracture or post " traumatic subluxation.   2.  Status post solid anterior spinal fusion C3-C6.   3.  Diffuse neural foraminal narrowing as described above.      CT Head w/o Contrast   Final Result   IMPRESSION:   HEAD CT:   1.  No CT finding of a mass, hemorrhage or focal area suggestive of acute infarct.   2.  Diffuse age related changes.      CERVICAL SPINE CT:   1.  No CT evidence for acute fracture or post traumatic subluxation.   2.  Status post solid anterior spinal fusion C3-C6.   3.  Diffuse neural foraminal narrowing as described above.        Emergency Department Course & Assessments:     Independent Interpretation (X-rays, CTs, rhythm strip):  CXR is clear for PNA    Consultations/Discussion of Management or Tests:  ED Course as of 12/06/23 0113   Tue Dec 05, 2023   2240 I obtained history and examined the patient as noted above.       Social Determinants of Health affecting care:   None    Disposition:  The patient was discharged to home.     Impression & Plan    CMS Diagnoses: None      Medical Decision Making:  Patient is a 78-year-old woman with past medical history of Parkinson disease and frequent falls who presents to the emergency department with a fall out of her wheelchair.  Patient reports that she nodded off while sitting in her wheelchair and fell forward and hit her head.  She does not have any significant pain but does note a abrasion to her forehead.  CT of the head and cervical spine were obtained which did not show any evidence of traumatic pathology.  X-ray of the chest showed no evidence of rib fractures or pneumothorax.  The remainder of her head to toe exam was reassuring.  She did report recent cough but x-ray shows no signs of pneumonia and her lungs are clear bilaterally.  She is oxygenating well on room air today and has no significant temperature.  At this point, I feel safe discharging her home.  She also feels reassured and feels comfortable with discharge plan.    Diagnosis:    ICD-10-CM    1.  Fall, initial encounter  W19.XXXA       2. Facial abrasion, initial encounter  S00.81XA         Scribe Disclosure:  I, Rudy Valdes, am serving as a scribe at 1:14 AM on 12/6/2023 to document services personally performed by Luis M Sosa MD based on my observations and the provider's statements to me.   12/5/2023   Luis M Sosa MD, Christopher Joseph, MD  12/06/23 0536

## 2023-12-06 NOTE — ED TRIAGE NOTES
Pt BIBA from assisted living after falling out of her wheelchair. Pt fell asleep in her wheel chair and fell forward and hit her head. Pt has a lac on her forehead. Denies LOC an blood thinners. No head or neck pain. EMS placed C-collar. VSS except hypertensive.      Triage Assessment (Adult)       Row Name 12/05/23 2036          Triage Assessment    Airway WDL WDL        Respiratory WDL    Respiratory WDL WDL        Skin Circulation/Temperature WDL    Skin Circulation/Temperature WDL WDL        Cardiac WDL    Cardiac WDL WDL        Peripheral/Neurovascular WDL    Peripheral Neurovascular WDL WDL        Cognitive/Neuro/Behavioral WDL    Cognitive/Neuro/Behavioral WDL WDL

## 2024-02-06 ENCOUNTER — LAB REQUISITION (OUTPATIENT)
Dept: LAB | Facility: CLINIC | Age: 79
End: 2024-02-06
Payer: MEDICARE

## 2024-02-06 DIAGNOSIS — G31.83 NEUROCOGNITIVE DISORDER WITH LEWY BODIES (CODE) (H): ICD-10-CM

## 2024-02-06 DIAGNOSIS — I10 ESSENTIAL (PRIMARY) HYPERTENSION: ICD-10-CM

## 2024-02-06 DIAGNOSIS — M81.0 AGE-RELATED OSTEOPOROSIS WITHOUT CURRENT PATHOLOGICAL FRACTURE: ICD-10-CM

## 2024-02-07 LAB
ERYTHROCYTE [DISTWIDTH] IN BLOOD BY AUTOMATED COUNT: 12.5 % (ref 10–15)
HCT VFR BLD AUTO: 39.7 % (ref 35–47)
HGB BLD-MCNC: 13.1 G/DL (ref 11.7–15.7)
MCH RBC QN AUTO: 35.3 PG (ref 26.5–33)
MCHC RBC AUTO-ENTMCNC: 33 G/DL (ref 31.5–36.5)
MCV RBC AUTO: 107 FL (ref 78–100)
PLATELET # BLD AUTO: 296 10E3/UL (ref 150–450)
RBC # BLD AUTO: 3.71 10E6/UL (ref 3.8–5.2)
WBC # BLD AUTO: 5 10E3/UL (ref 4–11)

## 2024-02-07 PROCEDURE — 85027 COMPLETE CBC AUTOMATED: CPT | Mod: ORL

## 2024-02-07 PROCEDURE — P9603 ONE-WAY ALLOW PRORATED MILES: HCPCS | Mod: ORL

## 2024-02-07 PROCEDURE — 82607 VITAMIN B-12: CPT | Mod: ORL

## 2024-02-07 PROCEDURE — 82306 VITAMIN D 25 HYDROXY: CPT | Mod: ORL

## 2024-02-07 PROCEDURE — 80048 BASIC METABOLIC PNL TOTAL CA: CPT | Mod: ORL

## 2024-02-07 PROCEDURE — 36415 COLL VENOUS BLD VENIPUNCTURE: CPT | Mod: ORL

## 2024-02-07 PROCEDURE — 84443 ASSAY THYROID STIM HORMONE: CPT | Mod: ORL

## 2024-02-08 LAB
ANION GAP SERPL CALCULATED.3IONS-SCNC: 11 MMOL/L (ref 7–15)
BUN SERPL-MCNC: 19.5 MG/DL (ref 8–23)
CALCIUM SERPL-MCNC: 10.4 MG/DL (ref 8.8–10.2)
CHLORIDE SERPL-SCNC: 104 MMOL/L (ref 98–107)
CREAT SERPL-MCNC: 0.57 MG/DL (ref 0.51–0.95)
DEPRECATED HCO3 PLAS-SCNC: 28 MMOL/L (ref 22–29)
EGFRCR SERPLBLD CKD-EPI 2021: >90 ML/MIN/1.73M2
GLUCOSE SERPL-MCNC: 79 MG/DL (ref 70–99)
POTASSIUM SERPL-SCNC: 4.4 MMOL/L (ref 3.4–5.3)
SODIUM SERPL-SCNC: 143 MMOL/L (ref 135–145)
TSH SERPL DL<=0.005 MIU/L-ACNC: 0.73 UIU/ML (ref 0.3–4.2)
VIT B12 SERPL-MCNC: 893 PG/ML (ref 232–1245)
VIT D+METAB SERPL-MCNC: 70 NG/ML (ref 20–50)

## 2024-02-26 ENCOUNTER — HOSPITAL ENCOUNTER (EMERGENCY)
Facility: CLINIC | Age: 79
Discharge: HOME | End: 2024-02-26
Attending: EMERGENCY MEDICINE | Admitting: EMERGENCY MEDICINE
Payer: MEDICARE

## 2024-02-26 ENCOUNTER — APPOINTMENT (OUTPATIENT)
Dept: GENERAL RADIOLOGY | Facility: CLINIC | Age: 79
End: 2024-02-26
Attending: EMERGENCY MEDICINE
Payer: MEDICARE

## 2024-02-26 VITALS
OXYGEN SATURATION: 98 % | TEMPERATURE: 97.5 F | SYSTOLIC BLOOD PRESSURE: 183 MMHG | DIASTOLIC BLOOD PRESSURE: 84 MMHG | BODY MASS INDEX: 19.83 KG/M2 | HEART RATE: 57 BPM | RESPIRATION RATE: 20 BRPM | HEIGHT: 61 IN | WEIGHT: 105 LBS
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
## 2024-02-26 DIAGNOSIS — T17.928A ASPIRATION OF FOOD, INITIAL ENCOUNTER: ICD-10-CM

## 2024-02-26 PROCEDURE — 99283 EMERGENCY DEPT VISIT LOW MDM: CPT | Mod: 25

## 2024-02-26 PROCEDURE — 71046 X-RAY EXAM CHEST 2 VIEWS: CPT

## 2024-02-26 ASSESSMENT — ACTIVITIES OF DAILY LIVING (ADL)
ADLS_ACUITY_SCORE: 38

## 2024-02-26 ASSESSMENT — COLUMBIA-SUICIDE SEVERITY RATING SCALE - C-SSRS
2. HAVE YOU ACTUALLY HAD ANY THOUGHTS OF KILLING YOURSELF IN THE PAST MONTH?: NO
6. HAVE YOU EVER DONE ANYTHING, STARTED TO DO ANYTHING, OR PREPARED TO DO ANYTHING TO END YOUR LIFE?: NO
1. IN THE PAST MONTH, HAVE YOU WISHED YOU WERE DEAD OR WISHED YOU COULD GO TO SLEEP AND NOT WAKE UP?: NO

## 2024-02-27 NOTE — ED PROVIDER NOTES
"  History     Chief Complaint:  Oral Swelling       The history is provided by the patient.      Pablo Baez is a 78 year old female who presents with oral swelling. Patient states she was eating a chocolate Cristhian truffle when she felt a piece of chocolate go into her trachea around 1500. Patient says she was able to breathe and and swallow. She notes some baseline swallowing difficulty due to parkinson's but denies any worsening ability. She called nurse triage and was told to com tot the emergency department. She endorses coughing that worsens with deep breaths. Denies shortness of breath, chest pain, neck pain or pain in the throat.     Independent Historian:   None - Patient Only    Review of External Notes:   Reviewed the nurse triage note referring the patient to the ER.      Medications:    Donepezil  Carbidopa-levodopa   Famotidine   Glycopyrrolate  Senna-docusate    Past Medical History:    Enterocolitis due to Clostridium difficile   Bacterial pneumonia   Closed fracture of neck of right femur   Left displaced femoral neck fracture   Hypokinetic Parkinsonian dysphonia   Basal cell carcinoma   Parkinson's disease   Cervical spondylosis with myelopathy   Calculus of kidney   Spondylolisthesis of cervical region   Hypertension  DJD  Herpes simplex virus   Cellulitis and abscess of face     Past Surgical History:    Bilateral umbilical hernia repair  Colonoscopy  Sigmoidoscopy  Face lift  Shoulder surgery  C3-C4 fusion  Right hip surgery    Physical Exam   Patient Vitals for the past 24 hrs:   BP Temp Temp src Pulse Resp SpO2 Height Weight   02/26/24 2010 (!) 183/84 -- -- -- -- 98 % -- --   02/26/24 1926 (!) 180/84 -- -- -- -- 96 % -- --   02/26/24 1910 (!) 170/82 97.5  F (36.4  C) Oral 57 20 98 % 1.549 m (5' 1\") 47.6 kg (105 lb)      Physical Exam  Vitals and nursing note reviewed.   Constitutional:       General: She is not in acute distress.     Appearance: She is ill-appearing (Chronically " ill-appearing).   HENT:      Head: Normocephalic and atraumatic.      Right Ear: External ear normal.      Left Ear: External ear normal.      Nose: Nose normal.      Mouth/Throat:      Mouth: Mucous membranes are moist.      Comments: Tolerating secretions  Eyes:      Extraocular Movements: Extraocular movements intact.      Conjunctiva/sclera: Conjunctivae normal.   Cardiovascular:      Rate and Rhythm: Normal rate and regular rhythm.      Heart sounds: No murmur heard.  Pulmonary:      Effort: Pulmonary effort is normal. No respiratory distress.      Breath sounds: Normal breath sounds. No stridor. No wheezing, rhonchi or rales.   Abdominal:      General: Abdomen is flat. Bowel sounds are normal. There is no distension.      Palpations: Abdomen is soft.      Tenderness: There is no abdominal tenderness. There is no guarding or rebound.   Musculoskeletal:         General: No deformity or signs of injury.      Cervical back: Normal range of motion and neck supple.   Skin:     General: Skin is warm and dry.      Findings: No rash.   Neurological:      Mental Status: She is alert and oriented to person, place, and time.   Psychiatric:         Behavior: Behavior normal.           Emergency Department Course   Imaging:  XR Chest 2 Views   Final Result   IMPRESSION: Hyperaeration, correlate for COPD. Borderline enlarged heart. No pneumothorax or pleural effusion. No focal consolidation. No acute osseous abnormality.         Emergency Department Course & Assessments:  Interventions:  Medications - No data to display     Assessments:  1947 I obtained history and examined the patient as noted above.     Independent Interpretation (X-rays, CTs, rhythm strip):  I reviewed the chest x-ray and there is no obvious infiltrate or foreign body per my read    Consultations/Discussion of Management or Tests:   None    Social Determinants of Health affecting care:   None    Disposition:  The patient was discharged.     Impression &  Plan    Medical Decision Makin-year-old female presenting with aspiration of chocolate into her airway.  This occurred hours ago and she is now asymptomatic without any difficulty breathing, cough, dysphagia, etc.  Chest x-ray is unremarkable.  Patient was ordered in the ER and had no worsening of her condition.  We discussed return precautions and recommended primary care follow-up as needed.      Diagnosis:    ICD-10-CM    1. Aspiration of food, initial encounter  T17.928A     W44.F3XA            Discharge Medications:  Discharge Medication List as of 2024 10:10 PM         Scribe Disclosure:  IMaru, am serving as a scribe at 9:12 PM on 2024 to document services personally performed by Lwe Salmeron MD based on my observations and the provider's statements to me.     2024   Lew Salmeron MD Goodwin, Shaun M, MD  24 0223

## 2024-02-27 NOTE — ED TRIAGE NOTES
Pt gopi brady ems states that she has a truffle around 1500 today and that she feels like it got stuck she was then told by her daughter to call ems around 1700 pt waited a little bit and then called does not appear to have increased work of breathing pt does not appear to have trouble swallowing but does state there is something there   Pt states she has chronic neck pain      Triage Assessment (Adult)       Row Name 02/26/24 1912          Triage Assessment    Airway WDL WDL        Respiratory WDL    Respiratory WDL WDL        Skin Circulation/Temperature WDL    Skin Circulation/Temperature WDL WDL        Cardiac WDL    Cardiac WDL WDL        Peripheral/Neurovascular WDL    Peripheral Neurovascular WDL WDL        Cognitive/Neuro/Behavioral WDL    Cognitive/Neuro/Behavioral WDL WDL

## 2024-03-24 ENCOUNTER — APPOINTMENT (OUTPATIENT)
Dept: GENERAL RADIOLOGY | Facility: CLINIC | Age: 79
End: 2024-03-24
Attending: EMERGENCY MEDICINE
Payer: MEDICARE

## 2024-03-24 ENCOUNTER — HOSPITAL ENCOUNTER (EMERGENCY)
Facility: CLINIC | Age: 79
Discharge: HOME | End: 2024-03-25
Attending: EMERGENCY MEDICINE | Admitting: EMERGENCY MEDICINE
Payer: MEDICARE

## 2024-03-24 ENCOUNTER — APPOINTMENT (OUTPATIENT)
Dept: CT IMAGING | Facility: CLINIC | Age: 79
End: 2024-03-24
Attending: EMERGENCY MEDICINE
Payer: MEDICARE

## 2024-03-24 DIAGNOSIS — S01.01XA SCALP LACERATION, INITIAL ENCOUNTER: ICD-10-CM

## 2024-03-24 DIAGNOSIS — M25.552 HIP PAIN, LEFT: ICD-10-CM
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
## 2024-03-24 DIAGNOSIS — S09.90XA CLOSED HEAD INJURY, INITIAL ENCOUNTER: ICD-10-CM

## 2024-03-24 LAB
ANION GAP SERPL CALCULATED.3IONS-SCNC: 8 MMOL/L (ref 7–15)
BASOPHILS # BLD AUTO: 0 10E3/UL (ref 0–0.2)
BASOPHILS NFR BLD AUTO: 0 %
BUN SERPL-MCNC: 21.5 MG/DL (ref 8–23)
CALCIUM SERPL-MCNC: 9.8 MG/DL (ref 8.8–10.2)
CHLORIDE SERPL-SCNC: 102 MMOL/L (ref 98–107)
CREAT SERPL-MCNC: 0.7 MG/DL (ref 0.51–0.95)
DEPRECATED HCO3 PLAS-SCNC: 31 MMOL/L (ref 22–29)
EGFRCR SERPLBLD CKD-EPI 2021: 89 ML/MIN/1.73M2
EOSINOPHIL # BLD AUTO: 0.1 10E3/UL (ref 0–0.7)
EOSINOPHIL NFR BLD AUTO: 1 %
ERYTHROCYTE [DISTWIDTH] IN BLOOD BY AUTOMATED COUNT: 12.6 % (ref 10–15)
GLUCOSE BLDC GLUCOMTR-MCNC: 97 MG/DL (ref 70–99)
GLUCOSE SERPL-MCNC: 113 MG/DL (ref 70–99)
HCT VFR BLD AUTO: 37.9 % (ref 35–47)
HGB BLD-MCNC: 12.4 G/DL (ref 11.7–15.7)
IMM GRANULOCYTES # BLD: 0 10E3/UL
IMM GRANULOCYTES NFR BLD: 0 %
LYMPHOCYTES # BLD AUTO: 1.4 10E3/UL (ref 0.8–5.3)
LYMPHOCYTES NFR BLD AUTO: 17 %
MCH RBC QN AUTO: 34.5 PG (ref 26.5–33)
MCHC RBC AUTO-ENTMCNC: 32.7 G/DL (ref 31.5–36.5)
MCV RBC AUTO: 106 FL (ref 78–100)
MONOCYTES # BLD AUTO: 0.8 10E3/UL (ref 0–1.3)
MONOCYTES NFR BLD AUTO: 10 %
NEUTROPHILS # BLD AUTO: 5.7 10E3/UL (ref 1.6–8.3)
NEUTROPHILS NFR BLD AUTO: 72 %
NRBC # BLD AUTO: 0 10E3/UL
NRBC BLD AUTO-RTO: 0 /100
PLATELET # BLD AUTO: 282 10E3/UL (ref 150–450)
POTASSIUM SERPL-SCNC: 4 MMOL/L (ref 3.4–5.3)
RBC # BLD AUTO: 3.59 10E6/UL (ref 3.8–5.2)
SODIUM SERPL-SCNC: 141 MMOL/L (ref 135–145)
WBC # BLD AUTO: 8 10E3/UL (ref 4–11)

## 2024-03-24 PROCEDURE — 73502 X-RAY EXAM HIP UNI 2-3 VIEWS: CPT

## 2024-03-24 PROCEDURE — 36415 COLL VENOUS BLD VENIPUNCTURE: CPT | Performed by: EMERGENCY MEDICINE

## 2024-03-24 PROCEDURE — 71045 X-RAY EXAM CHEST 1 VIEW: CPT

## 2024-03-24 PROCEDURE — 72125 CT NECK SPINE W/O DYE: CPT | Mod: ME

## 2024-03-24 PROCEDURE — 80048 BASIC METABOLIC PNL TOTAL CA: CPT | Performed by: EMERGENCY MEDICINE

## 2024-03-24 PROCEDURE — 250N000013 HC RX MED GY IP 250 OP 250 PS 637: Performed by: EMERGENCY MEDICINE

## 2024-03-24 PROCEDURE — 70450 CT HEAD/BRAIN W/O DYE: CPT | Mod: ME

## 2024-03-24 PROCEDURE — 82962 GLUCOSE BLOOD TEST: CPT

## 2024-03-24 PROCEDURE — 99291 CRITICAL CARE FIRST HOUR: CPT | Mod: 25

## 2024-03-24 PROCEDURE — 12001 RPR S/N/AX/GEN/TRNK 2.5CM/<: CPT

## 2024-03-24 PROCEDURE — 85049 AUTOMATED PLATELET COUNT: CPT | Performed by: EMERGENCY MEDICINE

## 2024-03-24 PROCEDURE — 93005 ELECTROCARDIOGRAM TRACING: CPT

## 2024-03-24 RX ORDER — ACETAMINOPHEN 500 MG
500 TABLET ORAL ONCE
Status: COMPLETED | OUTPATIENT
Start: 2024-03-24 | End: 2024-03-24

## 2024-03-24 RX ADMIN — ACETAMINOPHEN 500 MG: 500 TABLET ORAL at 22:34

## 2024-03-24 ASSESSMENT — ACTIVITIES OF DAILY LIVING (ADL): ADLS_ACUITY_SCORE: 38

## 2024-03-24 ASSESSMENT — COLUMBIA-SUICIDE SEVERITY RATING SCALE - C-SSRS
1. IN THE PAST MONTH, HAVE YOU WISHED YOU WERE DEAD OR WISHED YOU COULD GO TO SLEEP AND NOT WAKE UP?: NO
6. HAVE YOU EVER DONE ANYTHING, STARTED TO DO ANYTHING, OR PREPARED TO DO ANYTHING TO END YOUR LIFE?: NO
2. HAVE YOU ACTUALLY HAD ANY THOUGHTS OF KILLING YOURSELF IN THE PAST MONTH?: NO

## 2024-03-25 VITALS
TEMPERATURE: 97.4 F | SYSTOLIC BLOOD PRESSURE: 151 MMHG | RESPIRATION RATE: 18 BRPM | BODY MASS INDEX: 17.93 KG/M2 | WEIGHT: 105 LBS | OXYGEN SATURATION: 95 % | HEART RATE: 70 BPM | DIASTOLIC BLOOD PRESSURE: 82 MMHG | HEIGHT: 64 IN

## 2024-03-25 LAB
ATRIAL RATE - MUSE: 66 BPM
DIASTOLIC BLOOD PRESSURE - MUSE: NORMAL MMHG
INTERPRETATION ECG - MUSE: NORMAL
P AXIS - MUSE: 86 DEGREES
PR INTERVAL - MUSE: 150 MS
QRS DURATION - MUSE: 80 MS
QT - MUSE: 432 MS
QTC - MUSE: 452 MS
R AXIS - MUSE: 53 DEGREES
SYSTOLIC BLOOD PRESSURE - MUSE: NORMAL MMHG
T AXIS - MUSE: 64 DEGREES
VENTRICULAR RATE- MUSE: 66 BPM

## 2024-03-25 ASSESSMENT — ACTIVITIES OF DAILY LIVING (ADL): ADLS_ACUITY_SCORE: 38

## 2024-03-25 NOTE — ED TRIAGE NOTES
BIBA from [facility] for unwitnessed fall 45mins PTA. As per EMS, as per staff pt had head injury and wrapped with gauze to prevent bleeding. Pt on neck collar.  Pt states she was on her wheelchair and fall asleep and tipped forward.  Pt denies dizziness, SOB, chest pain.  A&O x4  ABCs intact

## 2024-03-25 NOTE — ED PROVIDER NOTES
"  History     Chief Complaint:  Fall and Head Injury     The history is provided by the patient.      Pablo Baez is a 78 year old female with a history of hypertension and DJD presenting from memory care after a fall and head injury. The incident was unwitnessed and occurred about 45 minutes prior to evaluation. Pablo denies loss of consciousness, and claims to remember the trauma. She was sitting in a wheelchair, when she fell asleep, and fell forward, striking her head on the ground. Patient endorses slight L hip pain, but notes pain with standing for a while. Patient denies headache, neck pain, nausea, vomiting, chest pain, difficulty breathing, new numbness or tingling, or back pain. Patient denies blood thinner use. She is ambulatory with a walker, but endorses frequent use of a wheelchair.     Independent Historian:   None - Patient Only    Review of External Notes:   9/13/23 office visit     Medications:    Donepezil  Carbidopa-levodopa   Famotidine   Glycopyrrolate  Senna-docusate     Past Medical History:    Enterocolitis due to Clostridium difficile   Bacterial pneumonia   Closed fracture of neck of right femur   Left displaced femoral neck fracture   Hypokinetic Parkinsonian dysphonia   Basal cell carcinoma   Parkinson's disease   Cervical spondylosis with myelopathy   Calculus of kidney   Spondylolisthesis of cervical region   Hypertension  DJD  Herpes simplex virus   Cellulitis and abscess of face      Past Surgical History:    Bilateral umbilical hernia repair  Colonoscopy  Sigmoidoscopy  Face lift  Shoulder surgery  C3-C4 fusion  Right hip surgery    Physical Exam   Patient Vitals for the past 24 hrs:   BP Temp Temp src Pulse Resp SpO2 Height Weight   03/24/24 2255 (!) 157/84 -- -- 67 -- 96 % -- --   03/24/24 2227 (!) 165/80 97.4  F (36.3  C) Oral 65 18 95 % 1.626 m (5' 4\") 47.6 kg (105 lb)      Physical Exam  General: Alert. Appears comfortable  Head:  The frontal scalp 2 cm laceration, " bleeding controlled with direct pressure, no skull depression, foreign body  Eyes:  Sclera white; Pupils are equal and round  ENT:    External ears normal.  No hemotympanum.      External nares normal.  No septal hematoma.    Neck:  No midline tenderness or pain with full ROM.  CV:  Rate as above with regular rhythm   2/2 radial and dorsal pedal pulses  Resp:  Breath sounds clear and equal bilaterally    Non-labored, no retractions or accessory muscle use  GI:  Abdomen soft, non-tender, non-distended    No rebound tenderness or guarding  MSK:  No midline tenderness or bony step-off    No deformity    Moves all extremities equally and symmetrically; mild L. Hip tenderness though full active ROM. No overlying ecchymosis  Skin:  No rash or lesions noted.  Neuro:   No apparent deficit.    Strength 5/5 x4.  Sensation intact x4.     Alert to person/place and time.   Psych:  Normal affect.        Emergency Department Course   ECG  ECG results from 03/24/24   EKG 12-lead, tracing only     Value    Systolic Blood Pressure     Diastolic Blood Pressure     Ventricular Rate 66    Atrial Rate 66    ME Interval 150    QRS Duration 80        QTc 452    P Axis 86    R AXIS 53    T Axis 64    Interpretation ECG      Sinus rhythm  Septal infarct (cited on or before 05-DEC-2023)  Abnormal ECG  When compared with ECG of 05-DEC-2023 22:51,  No significant change was found  EKG interpreted by me at 2218      Imaging:  XR Chest 1 View   Final Result   IMPRESSION: Single AP view of the chest was obtained. Cardiomediastinal silhouette is within normal limits. No suspicious focal pulmonary opacities. Few old left posterior rib fractures. No definite acute osseous pathology. If clinical suspicion of rib    fractures, remains high, rib series is more sensitive for detection of subtle rib fractures. Partially visualized postsurgical changes of lower cervical spine.      XR Pelvis w Hip Left 1 View   Final Result   IMPRESSION: Interval  postoperative changes of left hip bipolar endoprosthesis placement with cerclage wire about the proximal femoral stem. Heterotopic ossification adjacent to the greater trochanter on the left. Interval cannulated screw fixation    proximal right femur. Hardware is well-seated with good alignment. No acute fracture or dislocation. Degenerative changes lower lumbar spine and joints of the pelvis. Demineralization of the visualized bones.      CT Cervical Spine w/o Contrast   Final Result   IMPRESSION:   HEAD CT:   1.  No acute intracranial process.      CERVICAL SPINE CT:   1.  No CT evidence for acute fracture or post traumatic subluxation.      Head CT w/o contrast   Final Result   IMPRESSION:   HEAD CT:   1.  No acute intracranial process.      CERVICAL SPINE CT:   1.  No CT evidence for acute fracture or post traumatic subluxation.         Laboratory:  Labs Ordered and Resulted from Time of ED Arrival to Time of ED Departure   BASIC METABOLIC PANEL - Abnormal       Result Value    Sodium 141      Potassium 4.0      Chloride 102      Carbon Dioxide (CO2) 31 (*)     Anion Gap 8      Urea Nitrogen 21.5      Creatinine 0.70      GFR Estimate 89      Calcium 9.8      Glucose 113 (*)    CBC WITH PLATELETS AND DIFFERENTIAL - Abnormal    WBC Count 8.0      RBC Count 3.59 (*)     Hemoglobin 12.4      Hematocrit 37.9       (*)     MCH 34.5 (*)     MCHC 32.7      RDW 12.6      Platelet Count 282      % Neutrophils 72      % Lymphocytes 17      % Monocytes 10      % Eosinophils 1      % Basophils 0      % Immature Granulocytes 0      NRBCs per 100 WBC 0      Absolute Neutrophils 5.7      Absolute Lymphocytes 1.4      Absolute Monocytes 0.8      Absolute Eosinophils 0.1      Absolute Basophils 0.0      Absolute Immature Granulocytes 0.0      Absolute NRBCs 0.0     GLUCOSE BY METER - Normal    GLUCOSE BY METER POCT 97        Procedures     Laceration Repair      Procedure: Laceration Repair    Indication:  Laceration    Consent: Verbal    Location: Forehead     Length: 2 cm    Preparation: Irrigation with Sterile Saline.    Anesthesia/Sedation: Lidocaine with Epinephrine - 1%      Treatment/Exploration: Wound explored, no foreign bodies found     Closure: The wound was closed with one layer. Skin/superficial layer was closed with 3 x 4-0 Nylon using Interrupted sutures.     Patient Status: The patient tolerated the procedure well: Yes. There were no complications.    Emergency Department Course & Assessments:    Interventions:  Medications   acetaminophen (TYLENOL) tablet 500 mg (500 mg Oral $Given 3/24/24 2234)      Independent Interpretation (X-rays, CTs, rhythm strip):  I independently interpreted the head CT, no hemorrhage  I independently interpreted the pelvis and hip XR, no focal fracture L. hip  I independently interpreted the chest XR, no PTX    Assessments/Consultations/Discussion of Management or Tests:  ED Course as of 03/25/24 0008   Sun Mar 24, 2024   2218 I obtained the history and examined the patient as noted above.        Social Determinants of Health affecting care:   None    Disposition:  The patient was discharged to home.     Impression & Plan    CMS Diagnoses: None    MIPS (If applicable):  N/A    Medical Decision Making:  Patient is a 78-year-old female presenting after a fall from a chair while she was sleeping.  She presents with a scalp laceration which was repaired as noted herein.  She is otherwise neurologically intact though does have a history of dementia at baseline.  She underwent formal head CT as well as cervical spine which is without acute traumatic injury.  Chest x-ray without pneumothorax.  More chronic appearing rib fractures identified.  I doubt new serious traumatic injury. She noted slight L. Hip pain though reports this is more chronic and now new fracture/dislocation. Screening EKG without arrhythmia or ischemia.  No reported chest pain, fall seems more mechanical. Labs  overall reassuring without profound anemia or significant electrolyte derangements.  The remainder of her trauma exam is unremarkable.  Patient is to be managed as sustaining a closed head injury.  Red flag precautions discussed as well as postconcussive syndrome.  RN discussed this with her care facility.  I attempted to contact family though without response.  Patient was counseled on risk of scarring/infection as well and encouraged to follow-up with PCP in 1 week for suture removal.  All questions addressed.    Diagnosis:    ICD-10-CM    1. Fall, initial encounter  W19.XXXA       2. Scalp laceration, initial encounter  S01.01XA       3. Closed head injury, initial encounter  S09.90XA       4. Hip pain, left  M25.552          Discharge Medications:  New Prescriptions    No medications on file      Scribe Disclosure:  BLAKE, Josey Hendrix, am serving as a scribe at 10:57 PM on 3/24/2024 to document services personally performed by Alecia Watkins DO based on my observations and the provider's statements to me.  3/24/2024   Alecia Watkins DO McDonald, Lindsey E, DO  03/25/24 0026

## 2024-04-01 ENCOUNTER — HOSPITAL ENCOUNTER (EMERGENCY)
Facility: CLINIC | Age: 79
Discharge: NURSING, REHAB OR LONG-TERM CARE | End: 2024-04-02
Attending: EMERGENCY MEDICINE | Admitting: EMERGENCY MEDICINE
Payer: MEDICARE

## 2024-04-01 ENCOUNTER — APPOINTMENT (OUTPATIENT)
Dept: CT IMAGING | Facility: CLINIC | Age: 79
End: 2024-04-01
Payer: MEDICARE
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
## 2024-04-01 DIAGNOSIS — S01.81XA LACERATION OF FOREHEAD, INITIAL ENCOUNTER: ICD-10-CM

## 2024-04-01 DIAGNOSIS — S09.90XA CLOSED HEAD INJURY, INITIAL ENCOUNTER: ICD-10-CM

## 2024-04-01 DIAGNOSIS — S01.01XA SCALP LACERATION, INITIAL ENCOUNTER: ICD-10-CM

## 2024-04-01 LAB
ALBUMIN SERPL BCG-MCNC: 4.7 G/DL (ref 3.5–5.2)
ALP SERPL-CCNC: 56 U/L (ref 40–150)
ALT SERPL W P-5'-P-CCNC: ABNORMAL U/L
ANION GAP SERPL CALCULATED.3IONS-SCNC: 9 MMOL/L (ref 7–15)
AST SERPL W P-5'-P-CCNC: ABNORMAL U/L
BASOPHILS # BLD AUTO: 0 10E3/UL (ref 0–0.2)
BASOPHILS NFR BLD AUTO: 1 %
BILIRUB SERPL-MCNC: 0.4 MG/DL
BUN SERPL-MCNC: 23.3 MG/DL (ref 8–23)
CALCIUM SERPL-MCNC: 10.1 MG/DL (ref 8.8–10.2)
CHLORIDE SERPL-SCNC: 101 MMOL/L (ref 98–107)
CREAT SERPL-MCNC: 0.73 MG/DL (ref 0.51–0.95)
DEPRECATED HCO3 PLAS-SCNC: 31 MMOL/L (ref 22–29)
EGFRCR SERPLBLD CKD-EPI 2021: 84 ML/MIN/1.73M2
EOSINOPHIL # BLD AUTO: 0.1 10E3/UL (ref 0–0.7)
EOSINOPHIL NFR BLD AUTO: 2 %
ERYTHROCYTE [DISTWIDTH] IN BLOOD BY AUTOMATED COUNT: 12.8 % (ref 10–15)
GLUCOSE SERPL-MCNC: 117 MG/DL (ref 70–99)
HCT VFR BLD AUTO: 37.8 % (ref 35–47)
HGB BLD-MCNC: 12.2 G/DL (ref 11.7–15.7)
HOLD SPECIMEN: NORMAL
IMM GRANULOCYTES # BLD: 0 10E3/UL
IMM GRANULOCYTES NFR BLD: 0 %
LYMPHOCYTES # BLD AUTO: 1.8 10E3/UL (ref 0.8–5.3)
LYMPHOCYTES NFR BLD AUTO: 35 %
MCH RBC QN AUTO: 34.7 PG (ref 26.5–33)
MCHC RBC AUTO-ENTMCNC: 32.3 G/DL (ref 31.5–36.5)
MCV RBC AUTO: 107 FL (ref 78–100)
MONOCYTES # BLD AUTO: 0.5 10E3/UL (ref 0–1.3)
MONOCYTES NFR BLD AUTO: 9 %
NEUTROPHILS # BLD AUTO: 2.7 10E3/UL (ref 1.6–8.3)
NEUTROPHILS NFR BLD AUTO: 53 %
NRBC # BLD AUTO: 0 10E3/UL
NRBC BLD AUTO-RTO: 0 /100
PLATELET # BLD AUTO: 310 10E3/UL (ref 150–450)
POTASSIUM SERPL-SCNC: 5.2 MMOL/L (ref 3.4–5.3)
PROT SERPL-MCNC: 6.9 G/DL (ref 6.4–8.3)
RBC # BLD AUTO: 3.52 10E6/UL (ref 3.8–5.2)
SODIUM SERPL-SCNC: 141 MMOL/L (ref 135–145)
WBC # BLD AUTO: 5 10E3/UL (ref 4–11)

## 2024-04-01 PROCEDURE — 12004 RPR S/N/AX/GEN/TRK7.6-12.5CM: CPT

## 2024-04-01 PROCEDURE — 93005 ELECTROCARDIOGRAM TRACING: CPT

## 2024-04-01 PROCEDURE — 84155 ASSAY OF PROTEIN SERUM: CPT | Performed by: EMERGENCY MEDICINE

## 2024-04-01 PROCEDURE — 36415 COLL VENOUS BLD VENIPUNCTURE: CPT | Performed by: EMERGENCY MEDICINE

## 2024-04-01 PROCEDURE — 72125 CT NECK SPINE W/O DYE: CPT | Mod: ME

## 2024-04-01 PROCEDURE — 85025 COMPLETE CBC W/AUTO DIFF WBC: CPT | Performed by: EMERGENCY MEDICINE

## 2024-04-01 PROCEDURE — 12001 RPR S/N/AX/GEN/TRNK 2.5CM/<: CPT

## 2024-04-01 PROCEDURE — 70450 CT HEAD/BRAIN W/O DYE: CPT | Mod: ME

## 2024-04-01 PROCEDURE — 12014 RPR F/E/E/N/L/M 5.1-7.5 CM: CPT | Mod: 59

## 2024-04-01 PROCEDURE — 99285 EMERGENCY DEPT VISIT HI MDM: CPT | Mod: 25

## 2024-04-01 RX ORDER — LIDOCAINE HYDROCHLORIDE 10 MG/ML
INJECTION, SOLUTION EPIDURAL; INFILTRATION; INTRACAUDAL; PERINEURAL
Status: COMPLETED
Start: 2024-04-01 | End: 2024-04-02

## 2024-04-01 ASSESSMENT — COLUMBIA-SUICIDE SEVERITY RATING SCALE - C-SSRS
6. HAVE YOU EVER DONE ANYTHING, STARTED TO DO ANYTHING, OR PREPARED TO DO ANYTHING TO END YOUR LIFE?: NO
1. IN THE PAST MONTH, HAVE YOU WISHED YOU WERE DEAD OR WISHED YOU COULD GO TO SLEEP AND NOT WAKE UP?: NO
2. HAVE YOU ACTUALLY HAD ANY THOUGHTS OF KILLING YOURSELF IN THE PAST MONTH?: NO

## 2024-04-01 ASSESSMENT — ACTIVITIES OF DAILY LIVING (ADL)
ADLS_ACUITY_SCORE: 38

## 2024-04-02 VITALS
DIASTOLIC BLOOD PRESSURE: 72 MMHG | RESPIRATION RATE: 16 BRPM | TEMPERATURE: 97.6 F | BODY MASS INDEX: 18.02 KG/M2 | OXYGEN SATURATION: 94 % | HEART RATE: 64 BPM | SYSTOLIC BLOOD PRESSURE: 154 MMHG | WEIGHT: 105 LBS

## 2024-04-02 LAB
ATRIAL RATE - MUSE: 67 BPM
DIASTOLIC BLOOD PRESSURE - MUSE: NORMAL MMHG
INTERPRETATION ECG - MUSE: NORMAL
P AXIS - MUSE: 62 DEGREES
PR INTERVAL - MUSE: 156 MS
QRS DURATION - MUSE: 78 MS
QT - MUSE: 414 MS
QTC - MUSE: 437 MS
R AXIS - MUSE: 43 DEGREES
SYSTOLIC BLOOD PRESSURE - MUSE: NORMAL MMHG
T AXIS - MUSE: 51 DEGREES
VENTRICULAR RATE- MUSE: 67 BPM

## 2024-04-02 PROCEDURE — 250N000009 HC RX 250

## 2024-04-02 RX ADMIN — LIDOCAINE HYDROCHLORIDE: 10 INJECTION, SOLUTION EPIDURAL; INFILTRATION; INTRACAUDAL; PERINEURAL at 00:39

## 2024-04-02 NOTE — ED PROVIDER NOTES
ED ATTENDING PHYSICIAN NOTE:   I evaluated this patient in conjunction with Adrien Faulkner PA-C  I have participated in the care of the patient and personally performed key elements of the history, exam, and medical decision making.      HPI:   Pablo Baez is a 78 year old female with history of Parkinson's disease, hypertension, osteoporosis, and degenerative joint disease of cervical spine who presents to the ED after a fall. Patient reports around 1900 she was headed to bed standing next to her bed when she slipped and fell hitting her head on her floor. She states she was wearing slippery worn out shoes. She notes it took 10 minutes for someone to come help her after hitting her pendent. She notes she just got her sutures out on her head this morning from an fall a week ago. She reports she uses a walker or her wheelchair, but mostly depends on her wheelchair due to her frequent falls. At this time, she is not concerned about her safety at her current facility and endorses her fall is purely mechanical in nature and she feels safe returning back to the facility. Furthermore, she declined needing a higher level of care. She denies recent headache, neck pain, back pain, hip pain, leg pain, chest pain, shortness of breath, dizziness, nausea, vomiting, urinary problems, diarrhea, bloody stool, loss of consciousness, dizziness, hip pain, new numbness, or new weakness. She reports that it is he preference to return back to her facility.    Independent Historian:   None - Patient Only    Review of External Notes: I reviewed last ED visit note.      EXAM:   Constitutional:       General: Not in acute distress.     HENT:      Head: Normocephalic. Multiple forehead and occipital scalp lacerations. No olson sign and raccoon eyes.     Face: No facial bone tenderness to palpation. No pain with jaw clenching.     Nose: No septal hematoma.     Mouth: Normal dentition.  Eyes:      Extraocular Movements: Extraocular  movements intact.      Conjunctiva/sclera: Conjunctivae normal.      Pupils: Pupils equal, round, and reactive to light bilaterally.  Cardiovascular:      Rate and Rhythm: Regular rate and regular rhythm.      Circulation: Palpable and even bilateral radial artery  pulses. Cap refill <2 seconds.  Pulmonary:      Effort: Pulmonary effort is normal. No respiratory distress.      Breath sounds: Normal breath sounds bilaterally.   Abdominal:      General: Abdomen is flat. There is no distension.      Palpations: Abdomen is soft.      Tenderness: There is no abdominal tenderness to palpation.   Musculoskeletal:      Cervical spine: C-collar in place. No rigidity. No midline cervical spine tenderness to palpation.     Thoracic: No chest wall tenderness to palpation. No collarbone tenderness to palpation.     Left arm: Normal range of motion. No deformity. No tenderness to palpation.      Right arm: Normal range of motion. No deformity. No tenderness to palpation.     Right leg: Normal range of motion. No deformity. No tenderness to palpation.     Left leg: Normal range of motion. No deformity. No tenderness to palpation.  Skin:     General: Skin is warm and dry.   Neurological:      General: No focal deficit present.     Mental Status: Alert and oriented to person, place, time, and situation.  Psychiatric:         Mood and Affect: Mood normal.         Behavior: Behavior normal.    ECG:   ECG results from 04/01/24   EKG 12-lead, tracing only     Value    Systolic Blood Pressure     Diastolic Blood Pressure     Ventricular Rate 67    Atrial Rate 67    FL Interval 156    QRS Duration 78        QTc 437    P Axis 62    R AXIS 43    T Axis 51    Interpretation ECG      Sinus rhythm  Septal infarct (cited on or before 05-DEC-2023)  Abnormal ECG  When compared with ECG of 24-MAR-2024 22:17,  No significant change was found  Taken at 2021 and read at 2231     Independent Interpretation (X-rays, CTs, rhythm  strip):  None    Consultations/Discussion of Management or Tests:  ED Course as of 04/02/24 0044   Mon Apr 01, 2024   2100 I examined and evaluated the patient  JS   2149 I obtained history and examined the patient as noted above.    2231 EKG 12-lead, tracing only  EKG normal sinus rhythm.  Rate of 67.  Normal NH and QRS.  Normal QTc.  No acute ST elevation or depression as compared with 3/24/2024 EKG.   2253 I attempted to call the patient's daughter in regards to the patient. No voicemail system set-up.   2254 I re-called and spoke with patient's daughter Maye in regards to the patient.  Daughter states that patient has had frequent falls, but otherwise feels that the facility has appropriate level of care for the patient.  Per daughter, patient is nearly always wheelchair dependent, but frequently falls asleep in her wheelchair which leads to her falls which she feels is probably what happened today. I did discuss potential observation admission for potential PT/OT evaluation and disposition planning if there is concern regarding the care patient receives at the current facility, but daughter believes that if there is otherwise no other admissible diagnoses, she would prefer and is comfortable for patient to return back to facility.  She will speak with the facility tomorrow regarding finding a solution to further prevent patient's frequent falls.   2255 I spoke with Emily the overnight RN at [facility], they are aware of the more frequent falls.  The patient has not been following instructions regarding fall precautions.  Staff plans on reaching out to family to discuss increased fall precautions or possible need for higher level of care.  [facility] is comfortable having patient return tonight, they will plan for q 1 h overnight checks tonight. - NIC Lynn Apr 02, 2024   0010 On my reevaluation of the patient.  Patient in bed in no acute distress.  Wounds repaired.  Again had discussion with the patient regarding  potential observation admission if there is concern about her safety back at the facility, but patient states that she feels comfortable with discharge back to facility and she states that she does not require higher level of care.  Patient is alert and oriented x 4, as a result, I will respect her wishes and discharge her back to facility.  Per conversation that [facility] had with facility, they will plan for every hour overnight checks to ensure patient's safety and have family conference with patient's family tomorrow regarding further safety planning or if patient would need a higher level of care.     Social Determinants of Health affecting care:   None     MEDICAL DECISION MAKING/ASSESSMENT AND PLAN:   78-year-old female as described above presents to the emergency department for recurrent falls, closed head injury, and scalp laceration/forehead laceration.  Patient hemodynamically stable at time of evaluation.  Afebrile.  Alert and oriented and able to clearly recount her fall today explaining that this was a mechanical fall in which her feet slipped.  Patient denies any prodromal symptoms such as chest pain, shortness of breath, lightheadedness, or dizziness.  She also denies any loss of consciousness.  Patient is not on the blood thinner.  No other injuries found on head to toe examination.  Will obtain CT head and cervical spine for evaluation for head and neck injury from the fall today.  Basic lab work for evaluate for potential electrolyte derangements or underlying infections contributing to patient's fall.  Nonetheless, patient is otherwise asymptomatic.  Thankfully, lab work and imaging all resulted negative and patient otherwise feels well.  Initially, given patient's recurrent falls and EMS reports regarding concerns of patient's level of care that she is receiving at the facility, I did offer patient potential observation admission in addition to having a discussion with patient's daughter over the  phone.  Nonetheless, patient would prefer to be discharged back to facility and she feels safe returning back to the facility and declined needing higher level of care.  Daughter is in agreement for patient to return, but she will speak with the facility regarding further safety planning.  Per LEVON's conversation with the facility, they are comfortable with patient returning back to the facility as well at this time, but they are planning on having a family conference with discussion on whether or not patient would potentially need further needs, higher level of care, or transition to a different facility.  Laceration repaired by [facility].  Please refer to [facility] note for details.  Discussed return precautions.  Answered all questions.  Patient voiced understanding and agreement with plan.    Please refer to ED course above as part of continuation of MDM for details on the patient's treatment course and any changes or updates in care plan beyond my initial evaluation and MDM creation.     DIAGNOSIS:     ICD-10-CM    1. Fall, initial encounter  W19.XXXA       2. Closed head injury, initial encounter  S09.90XA       3. Laceration of forehead, initial encounter  S01.81XA       4. Scalp laceration, initial encounter  S01.01XA          DISPOSITION:   Discharge     Scribe Disclosure:  I, Radha Coker, am serving as a scribe at 9:09 PM on 4/1/2024 to document services personally performed by Wisam Aguillon DO based on my observations and the provider's statements to me.     4/1/2024  [facility] EMERGENCY DEPT     Wisam Aguillon DO  04/02/24 0047

## 2024-04-02 NOTE — ED TRIAGE NOTES
Patient arrives after falling out of her wheelchair at facility. Fall was unwitnessed. No thinners. Denies neck or back pain. No LOC. Laceration to forehead. EMS report concerns with facility and the level of care patient is receiving at the facility. Patient is forgetful at baseline.

## 2024-04-02 NOTE — DISCHARGE INSTRUCTIONS
Please follow-up with your primary care provider and/or specialist regarding your visit to the ER today.    Please return to the emergency department should you experience any of the symptoms we specifically discussed, including but not limited to recurrence or worsening of your symptoms, or development of any new and concerning symptoms.    Please follow-up with your primary care doctor in 7 to 10 days for suture removal.    Discharge Instructions  Laceration (Cut)    You were seen today for a laceration (cut).  Your provider examined your laceration for any problems such a buried foreign body (like glass, a splinter, or gravel), or injury to blood vessels, tendons, and nerves.  Your provider may have also rinsed and/or scrubbed your laceration to help prevent an infection. It may not be possible to find all problems with your laceration on the first visit; occasionally foreign bodies or a tendon injury can go undetected.    Your laceration may have been closed in one of several ways:  No closure: many wounds will heal just fine without closure.  Stitches: regular stitches that require removal.  Staples: skin staples are often used in the scalp/head.  Wound adhesive (glue): skin glue can be used for certain lacerations and doesn t require removal.  Wound strips (aka Butterfly bandages or steri-strips): these are bandages that help to close a wound.  Absorbable stitches:  dissolving  stitches that go away on their own and usually don t require removal.    A small percentage of wounds will develop an infection regardless of how well the wound is cared for. Antibiotics are generally not indicated to prevent an infection so are only given for a small number of high-risk wounds. Some lacerations are too high risk to close, and are left open to heal because closure can increase the likelihood that an infection will develop.    Remember that all lacerations, no matter how expertly repaired, will cause scarring. We  consider many factors, techniques, and materials, in our efforts to provide the best possible cosmetic outcome.    Generally, every Emergency Department visit should have a follow-up clinic visit with either a primary or a specialty clinic/provider. Please follow-up as instructed by your emergency provider today.     Return to the Emergency Department right away if:  You have more redness, swelling, pain, drainage (pus), a bad smell, or red streaking from your laceration as these symptoms could indicate an infection.  You have a fever of 100.4 F or more.  You have bleeding that you cannot stop at home. If your cut starts to bleed, hold pressure on the bleeding area with a clean cloth or put pressure over the bandage.  If the bleeding does not stop after using constant pressure for 30 minutes, you should return to the Emergency Department for further treatment.  An area past the laceration is cool, pale, or blue compared with the other side, or has a slower return of color when squeezed.  Your dressing seems too tight or starts to get uncomfortable or painful. For children, signs of a problem might be irritability or restlessness.  You have loss of normal function or use of an area, such as being unable to straighten or bend a finger normally.  You have a numb area past the laceration.    Return to the Emergency Department or see your regular provider if:  The laceration starts to come open.   You have something coming out of the cut or a feeling that there is something in the laceration.  Your wound will not heal, or keeps breaking open. There can always be glass, wood, dirt or other things in any wound.  They will not always show up, even on x-rays.  If a wound does not heal, this may be why, and it is important to follow-up with your regular provider.    Home Care:  Take your dressing off in 12-24 hours, or as instructed by your provider, to check your laceration. Remove the dressing sooner if it seems too tight  or painful, or if it is getting numb, tingly, or pale past the dressing.  Gently wash your laceration 1-2 times daily with clean water and mild soap. It is okay to shower or run clean water over the laceration, but do not let the laceration soak in water (no swimming).  If your laceration was closed with wound adhesive or strips: pat it dry and leave it open to the air. For all other repairs: after you wash your laceration, or at least 2 times a day, apply antibiotic ointment (such as Neosporin  or Bacitracin ) to the laceration, then cover it with a Band-Aid  or gauze.  Keep the laceration clean. Wear gloves or other protective clothing if you are around dirt.    Follow-up for removal:  If your wound was closed with staples or regular stitches, they need to be removed according to the instructions and timeline specified by your provider today.  If your wound was closed with absorbable ( dissolving ) sutures, they should fall out, dissolve, or not be visible in about one week. If they are still visible, then they should be removed according to the instructions and timeline specified by your provider today.    Scars:  To help minimize scarring:  Wear sunscreen over the healed laceration when out in the sun.  Massage the area regularly once healed.  You may apply Vitamin E to the healed wound.  Wait. Scars improve in appearance over months and years.    If you were given a prescription for medicine here today, be sure to read all of the information (including the package insert) that comes with your prescription.  This will include important information about the medicine, its side effects, and any warnings that you need to know about.  The pharmacist who fills the prescription can provide more information and answer questions you may have about the medicine.  If you have questions or concerns that the pharmacist cannot address, please call or return to the Emergency Department.       Remember that you can always come  back to the Emergency Department if you are not able to see your regular provider in the amount of time listed above, if you get any new symptoms, or if there is anything that worries you.

## 2024-04-02 NOTE — ED PROVIDER NOTES
History     Chief Complaint:  Fall       HPI   Pablo Baez is a 78 year old female with history of Parkinson's and frequent falls who presents emergency department after a fall out of her wheelchair.  Patient states that she stood up from her wheelchair when she felt her feet slipped out from under her causing her to fall forward.  She complains of headache with pain in her forehead and pain in the back of her head and also complains of neck pain.  Patient states she fell forward however she is unsure how she got the laceration on the back of her head, she thinks her walker may have gone down with her and landed on the back of her head.  She states she is never certain of the mechanisms of her frequent falls.  She denies loss of consciousness, dizziness, chest pain or shortness of breath, abdominal pain, nausea and vomiting.      Independent Historian:   None - Patient Only    Review of External Notes:   3/24/2024 ED visit for fall with head laceration  12/5/2023 ED visit for fall  1/25/2023 ED visit for fall      Medications:    acetaminophen (TYLENOL) 500 MG tablet  carbidopa-levodopa (SINEMET CR)  MG CR tablet  carbidopa-levodopa (SINEMET)  MG tablet  carbidopa-levodopa (SINEMET)  MG tablet  cholecalciferol 50 MCG (2000 UT) tablet  famotidine (PEPCID) 20 MG tablet  glycopyrrolate (ROBINUL) 1 MG tablet  methocarbamol (ROBAXIN) 500 MG tablet  Multiple Vitamins-Minerals (HM MULTIVITAMIN ADULT GUMMY) CHEW  polyvinyl alcohol (LIQUIFILM TEARS) 1.4 % ophthalmic solution  senna-docusate (SENOKOT-S/PERICOLACE) 8.6-50 MG tablet        Past Medical History:    No past medical history on file.    Past Surgical History:    No past surgical history on file.     Physical Exam   Patient Vitals for the past 24 hrs:   BP Temp Pulse Resp SpO2 Weight   04/01/24 2345 (!) 164/73 -- 67 -- 94 % --   04/01/24 2115 (!) 163/71 -- 65 -- 97 % --   04/01/24 2022 (!) 170/90 -- -- -- -- --   04/01/24 2020 -- 97.6  F  (36.4  C) -- -- -- --   04/01/24 2019 -- -- 69 16 98 % 47.6 kg (105 lb)        Physical Exam  Physical Exam:  GENERAL: Warm, dry, alert, no acute distress, lying on gurney with a bandage on forehead and eyes closed.  HEENT: PERRLA, EOMs intact, no nystagmus, clear conjunctiva, oropharynx clear.  There is a 2 cm laceration left occipital area with bleeding controlled.  There is a collection of 4 small lacerations frontal scalp just within the hairline with largest 2 cm and smallest 0.75 cm.    NECK: No posterior midline tenderness nor step-offs, supple without lymphadenopathy.    HEART: Regular rate and rhythm, no murmur or rubs  LUNGS: CTAB, moving air well.  No crackles or wheezes are heard.  ABD: Soft, nontender, nondistended, no guarding, with good bowel sounds heard.  PELVIS: No pain with palpation or squeeze.  BACK: No CVAT, no obvious deformities  EXTREMITIES: Moves all extremities without difficulty, no calf tenderness or peripheral edema.  SKIN: Warm and dry without rash or lesions.  NEUROLOGICAL: No focal deficit.  Cranial nerves II through XII intact.    PSYCH: Appropriate mood and affect.         Emergency Department Course       Imaging:  CT Cervical Spine w/o Contrast   Final Result   IMPRESSION:   HEAD CT:   1.  No acute intracranial process.      CERVICAL SPINE CT:   1.  No CT evidence for acute fracture or post traumatic subluxation.      CT Head w/o Contrast   Final Result   IMPRESSION:   HEAD CT:   1.  No acute intracranial process.      CERVICAL SPINE CT:   1.  No CT evidence for acute fracture or post traumatic subluxation.             Laboratory:  Labs Ordered and Resulted from Time of ED Arrival to Time of ED Departure   COMPREHENSIVE METABOLIC PANEL - Abnormal       Result Value    Sodium 141      Potassium 5.2      Carbon Dioxide (CO2) 31 (*)     Anion Gap 9      Urea Nitrogen 23.3 (*)     Creatinine 0.73      GFR Estimate 84      Calcium 10.1      Chloride 101      Glucose 117 (*)      Alkaline Phosphatase 56      AST        ALT        Protein Total 6.9      Albumin 4.7      Bilirubin Total 0.4     CBC WITH PLATELETS AND DIFFERENTIAL - Abnormal    WBC Count 5.0      RBC Count 3.52 (*)     Hemoglobin 12.2      Hematocrit 37.8       (*)     MCH 34.7 (*)     MCHC 32.3      RDW 12.8      Platelet Count 310      % Neutrophils 53      % Lymphocytes 35      % Monocytes 9      % Eosinophils 2      % Basophils 1      % Immature Granulocytes 0      NRBCs per 100 WBC 0      Absolute Neutrophils 2.7      Absolute Lymphocytes 1.8      Absolute Monocytes 0.5      Absolute Eosinophils 0.1      Absolute Basophils 0.0      Absolute Immature Granulocytes 0.0      Absolute NRBCs 0.0          Procedures     Laceration Repair      Procedure: Laceration Repair    Indication: Laceration    Consent: Verbal    Location: Frontal scalp within hair line    Length: 7 cm    Preparation: Irrigation with 500cc Sterile Saline.    Anesthesia/Sedation: Lidocaine - 1%      Treatment/Exploration: Wound explored, no foreign bodies found     Closure: The wound was closed with one layer. Skin/superficial layer was closed with 10 x 4-0 Polypropylene (prolene)  using Interrupted sutures.     Patient Status: The patient tolerated the procedure well: Yes. There were no complications.       Procedure: Laceration Repair    Indication: Laceration    Consent: Verbal    Location: Scalp, top of head    Length: 1.5 cm    Preparation: Irrigation with 200 ml Sterile Saline.    Anesthesia/Sedation: Lidocaine - 1%      Treatment/Exploration: Wound explored, no foreign bodies found     Closure: The wound was closed with one layer. Skin/superficial layer was closed with 4 staples     Patient Status: The patient tolerated the procedure well: Yes. There were no complications.       Procedure: Laceration Repair    Indication: Laceration    Consent: Verbal    Location: Scalp, top of head    Length: 2.5 cm    Preparation: Irrigation with 300 ml  Sterile Saline.    Anesthesia/Sedation: Lidocaine - 1%      Treatment/Exploration: Wound explored, no foreign bodies found     Closure: The wound was closed with one layer. Skin/superficial layer was closed with 8 staples     Patient Status: The patient tolerated the procedure well: Yes. There were no complications.     Emergency Department Course & Assessments:    Interventions:  Medications   lidocaine (PF) (XYLOCAINE) 1 % injection (has no administration in time range)            Independent Interpretation (X-rays, CTs, rhythm strip):  Head CT no ICH  Neck CT no fractures or subluxations    Consultations/Discussion of Management or Tests:  Staffed with Dr. Aguillon  ED Course as of 04/02/24 0015   Mon Apr 01, 2024   2100 I examined and evaluated the patient  JS   2149 I obtained history and examined the patient as noted above.    2231 EKG 12-lead, tracing only  EKG normal sinus rhythm.  Rate of 67.  Normal VA and QRS.  Normal QTc.  No acute ST elevation or depression as compared with 3/24/2024 EKG.   2253 I attempted to call the patient's daughter in regards to the patient. No voicemail system set-up.   2254 I re-called and spoke with patient's daughter in regards to the patient.    2255 I spoke with Emily the overnight RN at [facility], they are aware of the more frequent falls.  The patient has not been following instructions regarding fall precautions.  Staff plans on reaching out to family to discuss increased fall precautions or possible need for higher level of care.  [facility] is comfortable having patient return tonight, they will plan for q 1 h overnight checks tonight.       Social Determinants of Health affecting care:   None    Disposition:  The patient was discharged.     Impression & Plan    CMS Diagnoses: None  Trauma:  Level of trauma activation: Emergency Department evaluation  Full Primary and Secondary survey with appropriate immobilization of spine completed in exam section.  C-collar and  immobilization: applied prior to arrival.  CSpine Clearance: Patient left in collar  GCS at arrival: 15  GCS at disposition: unchanged  Consults prior to admission or transfer: None  Procedures done in the ED: Laceration repair  Disposition: Discharge. Patient stable after completion of evaluation.     MIPS (If applicable):  N/A    Medical Decision Making:  Patient is a 78-year-old female with history of Parkinson's, hypertension, and frequent falls who presents after a fall from her wheelchair with lacerations on her forehead and occipital scalp.  Differential includes but is not limited to CVA, ICH, and various musculoskeletal injuries or soft injury among many others.  Vital signs were significant for hypertension at presentation, 170/90 otherwise she was not tachycardic or hypoxic.  Physical exam was significant for lacerations on the forehead and occipital scalp as noted above.  Last I was unable to clear C-spine due to neck pain and Iranian rule as well as a CT was indicated via CT rule therefore imaging of head and neck were obtained.  CT and head of neck were negative for ICH, fractures, subluxations, or other acute findings.  C-collar was removed patient.  Lacerations to scalp were closed as noted above, patient tolerated procedure well.  Dr. Aguillon spoke with patient's daughter regarding concerns for frequent falls, patient has had 4 falls within the last week.  I spoke with RN at [facility], she states patient has been uncooperative with regards to fall precautions in place.  Staff at [facility] plans on calling for family conference regarding these frequent falls to discuss additional measures that could be taken or if patient requires a higher level of care.  Felipa RN states patient may return to assisted living tonight, she will order Q 1 H checks for tonight.  I discussed all these findings with the patient.  I also discussed the importance of following fall precautions in the future to  prevent further falls.  The patient states she understands and will make an effort to follow fall precautions.  The patient was discharged awaiting stretcher transport back to assisted living facility.  Patient care was transferred to Dr. Gonzalez while awaiting transport.      Diagnosis:    ICD-10-CM    1. Fall, initial encounter  W19.XXXA       2. Closed head injury, initial encounter  S09.90XA       3. Laceration of forehead, initial encounter  S01.81XA       4. Scalp laceration, initial encounter  S01.01XA            Discharge Medications:  New Prescriptions    No medications on file          Adrien Faulkner PA-C  4/1/2024   Wisam Aguillon DO Speakman, John, PA-C  04/02/24 0020

## 2025-02-03 NOTE — ED NOTES
Bed: ED07  Expected date: 9/22/21  Expected time:   Means of arrival: Ambulance  Comments:  BV-1   Controlled with current regime